# Patient Record
Sex: MALE | Race: WHITE | NOT HISPANIC OR LATINO | Employment: UNEMPLOYED | URBAN - METROPOLITAN AREA
[De-identification: names, ages, dates, MRNs, and addresses within clinical notes are randomized per-mention and may not be internally consistent; named-entity substitution may affect disease eponyms.]

---

## 2017-01-08 PROBLEM — E10.3499 SEVERE NONPROLIFERATIVE DIABETIC RETINOPATHY WITHOUT MACULAR EDEMA ASSOCIATED WITH TYPE 1 DIABETES MELLITUS (HCC): Status: ACTIVE | Noted: 2017-01-08

## 2017-01-16 ENCOUNTER — PREP FOR SURGERY (OUTPATIENT)
Dept: CARDIAC SURGERY | Facility: CLINIC | Age: 52
End: 2017-01-16

## 2017-01-16 DIAGNOSIS — I73.9 PERIPHERAL VASCULAR DISEASE (HCC): Primary | ICD-10-CM

## 2017-01-16 RX ORDER — CHLORHEXIDINE GLUCONATE 500 MG/1
1 CLOTH TOPICAL EVERY 12 HOURS PRN
Status: CANCELLED | OUTPATIENT
Start: 2017-01-30

## 2017-01-17 ENCOUNTER — TELEPHONE (OUTPATIENT)
Dept: ENDOCRINOLOGY | Facility: CLINIC | Age: 52
End: 2017-01-17

## 2017-01-17 NOTE — TELEPHONE ENCOUNTER
Spoke to patient about upcoming vascular surgery. Reviewed his BG data. Having hyperglycemia more recently:    - Increasing NPH to 14 units qAM, 14 units qhs  - Pt to take 2-4 units of Humalog with all food  PLUS a correction scale of 1 unit :  1 unit : 75 points BG > 200.    - pt to call me with BG data in 3 days.   - Will give patient insulin instructions for night before and morning of surgery.     Shana Monahan MD     ----- Message from Glo Wilkins sent at 1/17/2017  4:23 PM EST -----  Contact: PATIENT   RE: UPCOMING SURGERY    PATIENT WANTED TO LET YOU KNOW THAT DR FREDERICK AT [facility] WILL BE PERFORMING SURGERY ON GROIN FOR BLOCKAGES. THAT SURGERY IS SCHEDULED FOR 1/31

## 2017-01-20 ENCOUNTER — TELEPHONE (OUTPATIENT)
Dept: ENDOCRINOLOGY | Facility: CLINIC | Age: 52
End: 2017-01-20

## 2017-01-20 DIAGNOSIS — IMO0002 UNCONTROLLED TYPE 1 DIABETES MELLITUS WITH COMPLICATION: Primary | ICD-10-CM

## 2017-01-20 NOTE — TELEPHONE ENCOUNTER
Pt called in BG log as requested. He has not been following my recommendations and as a result is having wide fluctuations in his blood glucose.      - Reviewed again with patient that he must take Humalog with all food and Boost supplements and he must use the correction scale I give him instead of guessing.    Pt to take the following insulins:    - NPH 14 units qAM, NPH 14 units qHS    - Humalog 2-4 units with food PLUS correction scale of 1 units per every 100 > 200 (see below)    - Boost supplements: 2 units   - Food: 4 units    Scale:   0 units if BG < 200  1 units if B - 300  2 units if B - 400  3 units if B - 500  4 units if BG: >500    Shana Monahan MD  Pt to call me with BG next week.    ----- Message from Radha Harris sent at 2017  1:32 PM EST -----  Contact: PATIENT  PATIENT STATED HE WAS TO CALL YOU TODAY. [phone]

## 2017-01-24 ENCOUNTER — HOSPITAL ENCOUNTER (OUTPATIENT)
Dept: OTHER | Age: 52
Discharge: HOME | End: 2017-01-24
Attending: NURSE PRACTITIONER | Admitting: NURSE PRACTITIONER

## 2017-01-24 LAB
AMPHETAMINE SCREEN, URINE: ABNORMAL
BARBITURATE SCREEN URINE: ABNORMAL
BENZODIAZEPINE SCREEN, URINE: ABNORMAL
CANNABINOID SCREEN URINE: ABNORMAL
COCAINE METABOLITE SCREEN URINE: ABNORMAL
Lab: ABNORMAL
METHADONE SCREEN, URINE: ABNORMAL
METHAMPHETAMINE, URINE: ABNORMAL
OPIATE SCREEN URINE: POSITIVE
PHENCYCLIDINE SCREEN URINE: ABNORMAL
PROPOXYPHENE SCREEN, URINE: ABNORMAL
TRICYCLIC, URINE: ABNORMAL
UR OXYCODONE RAPID SCREEN: ABNORMAL

## 2017-01-30 ENCOUNTER — APPOINTMENT (OUTPATIENT)
Dept: PREADMISSION TESTING | Facility: HOSPITAL | Age: 52
End: 2017-01-30

## 2017-01-31 ENCOUNTER — APPOINTMENT (OUTPATIENT)
Dept: GENERAL RADIOLOGY | Facility: HOSPITAL | Age: 52
End: 2017-01-31

## 2017-01-31 ENCOUNTER — ANESTHESIA EVENT (OUTPATIENT)
Dept: PERIOP | Facility: HOSPITAL | Age: 52
End: 2017-01-31

## 2017-01-31 ENCOUNTER — ANESTHESIA (OUTPATIENT)
Dept: PERIOP | Facility: HOSPITAL | Age: 52
End: 2017-01-31

## 2017-01-31 PROBLEM — I10 HYPERTENSION: Status: ACTIVE | Noted: 2017-01-31

## 2017-01-31 PROBLEM — B95.8 STAPH INFECTION: Status: ACTIVE | Noted: 2017-01-31

## 2017-01-31 PROBLEM — K31.84 GASTROPARESIS: Status: ACTIVE | Noted: 2017-01-31

## 2017-01-31 PROBLEM — Z85.89 HISTORY OF CANCER OF HEAD OR NECK: Status: ACTIVE | Noted: 2017-01-31

## 2017-01-31 PROCEDURE — 25010000002 CEFUROXIME PER 750 MG: Performed by: PHYSICIAN ASSISTANT

## 2017-01-31 PROCEDURE — 25010000002 MIDAZOLAM PER 1 MG: Performed by: NURSE ANESTHETIST, CERTIFIED REGISTERED

## 2017-01-31 PROCEDURE — 25010000002 ONDANSETRON PER 1 MG: Performed by: NURSE ANESTHETIST, CERTIFIED REGISTERED

## 2017-01-31 PROCEDURE — 25010000002 FENTANYL CITRATE (PF) 100 MCG/2ML SOLUTION: Performed by: NURSE ANESTHETIST, CERTIFIED REGISTERED

## 2017-01-31 PROCEDURE — 25010000002 PROPOFOL 10 MG/ML EMULSION: Performed by: NURSE ANESTHETIST, CERTIFIED REGISTERED

## 2017-01-31 PROCEDURE — 25010000002 PROTAMINE SULFATE PER 10 MG: Performed by: NURSE ANESTHETIST, CERTIFIED REGISTERED

## 2017-01-31 PROCEDURE — 25010000002 NEOSTIGMINE 10 MG/10ML SOLUTION: Performed by: NURSE ANESTHETIST, CERTIFIED REGISTERED

## 2017-01-31 PROCEDURE — 25010000002 DEXAMETHASONE PER 1 MG: Performed by: NURSE ANESTHETIST, CERTIFIED REGISTERED

## 2017-01-31 RX ORDER — MIDAZOLAM HYDROCHLORIDE 1 MG/ML
INJECTION INTRAMUSCULAR; INTRAVENOUS AS NEEDED
Status: DISCONTINUED | OUTPATIENT
Start: 2017-01-31 | End: 2017-01-31 | Stop reason: SURG

## 2017-01-31 RX ORDER — ESMOLOL HYDROCHLORIDE 10 MG/ML
INJECTION INTRAVENOUS AS NEEDED
Status: DISCONTINUED | OUTPATIENT
Start: 2017-01-31 | End: 2017-01-31 | Stop reason: SURG

## 2017-01-31 RX ORDER — LIDOCAINE HYDROCHLORIDE 20 MG/ML
INJECTION, SOLUTION INFILTRATION; PERINEURAL AS NEEDED
Status: DISCONTINUED | OUTPATIENT
Start: 2017-01-31 | End: 2017-01-31 | Stop reason: SURG

## 2017-01-31 RX ORDER — PROPOFOL 10 MG/ML
VIAL (ML) INTRAVENOUS AS NEEDED
Status: DISCONTINUED | OUTPATIENT
Start: 2017-01-31 | End: 2017-01-31 | Stop reason: SURG

## 2017-01-31 RX ORDER — PROTAMINE SULFATE 10 MG/ML
INJECTION, SOLUTION INTRAVENOUS AS NEEDED
Status: DISCONTINUED | OUTPATIENT
Start: 2017-01-31 | End: 2017-01-31 | Stop reason: SURG

## 2017-01-31 RX ORDER — GLYCOPYRROLATE 0.2 MG/ML
INJECTION INTRAMUSCULAR; INTRAVENOUS AS NEEDED
Status: DISCONTINUED | OUTPATIENT
Start: 2017-01-31 | End: 2017-01-31 | Stop reason: SURG

## 2017-01-31 RX ORDER — NEOSTIGMINE METHYLSULFATE 1 MG/ML
INJECTION, SOLUTION INTRAVENOUS AS NEEDED
Status: DISCONTINUED | OUTPATIENT
Start: 2017-01-31 | End: 2017-01-31 | Stop reason: SURG

## 2017-01-31 RX ORDER — ONDANSETRON 2 MG/ML
INJECTION INTRAMUSCULAR; INTRAVENOUS AS NEEDED
Status: DISCONTINUED | OUTPATIENT
Start: 2017-01-31 | End: 2017-01-31 | Stop reason: SURG

## 2017-01-31 RX ORDER — FENTANYL CITRATE 50 UG/ML
INJECTION, SOLUTION INTRAMUSCULAR; INTRAVENOUS AS NEEDED
Status: DISCONTINUED | OUTPATIENT
Start: 2017-01-31 | End: 2017-01-31 | Stop reason: SURG

## 2017-01-31 RX ORDER — DEXAMETHASONE SODIUM PHOSPHATE 4 MG/ML
INJECTION, SOLUTION INTRA-ARTICULAR; INTRALESIONAL; INTRAMUSCULAR; INTRAVENOUS; SOFT TISSUE AS NEEDED
Status: DISCONTINUED | OUTPATIENT
Start: 2017-01-31 | End: 2017-01-31 | Stop reason: SURG

## 2017-01-31 RX ORDER — ROCURONIUM BROMIDE 10 MG/ML
INJECTION, SOLUTION INTRAVENOUS AS NEEDED
Status: DISCONTINUED | OUTPATIENT
Start: 2017-01-31 | End: 2017-01-31 | Stop reason: SURG

## 2017-01-31 RX ADMIN — FENTANYL CITRATE 50 MCG: 50 INJECTION, SOLUTION INTRAMUSCULAR; INTRAVENOUS at 15:53

## 2017-01-31 RX ADMIN — ESMOLOL HYDROCHLORIDE 10 MG: 10 INJECTION, SOLUTION INTRAVENOUS at 16:11

## 2017-01-31 RX ADMIN — ROBINUL 0.4 MG: 0.2 INJECTION INTRAMUSCULAR; INTRAVENOUS at 17:05

## 2017-01-31 RX ADMIN — DEXAMETHASONE SODIUM PHOSPHATE 4 MG: 4 INJECTION, SOLUTION INTRAMUSCULAR; INTRAVENOUS at 16:01

## 2017-01-31 RX ADMIN — ONDANSETRON 4 MG: 2 INJECTION INTRAMUSCULAR; INTRAVENOUS at 17:05

## 2017-01-31 RX ADMIN — SODIUM CHLORIDE, POTASSIUM CHLORIDE, SODIUM LACTATE AND CALCIUM CHLORIDE: 600; 310; 30; 20 INJECTION, SOLUTION INTRAVENOUS at 15:42

## 2017-01-31 RX ADMIN — ESMOLOL HYDROCHLORIDE 10 MG: 10 INJECTION, SOLUTION INTRAVENOUS at 16:16

## 2017-01-31 RX ADMIN — FENTANYL CITRATE 50 MCG: 50 INJECTION, SOLUTION INTRAMUSCULAR; INTRAVENOUS at 17:10

## 2017-01-31 RX ADMIN — MIDAZOLAM HYDROCHLORIDE 2 MG: 1 INJECTION, SOLUTION INTRAMUSCULAR; INTRAVENOUS at 15:42

## 2017-01-31 RX ADMIN — ROCURONIUM BROMIDE 40 MG: 10 INJECTION INTRAVENOUS at 15:48

## 2017-01-31 RX ADMIN — FENTANYL CITRATE 100 MCG: 50 INJECTION, SOLUTION INTRAMUSCULAR; INTRAVENOUS at 15:43

## 2017-01-31 RX ADMIN — PROTAMINE SULFATE 25 MG: 10 INJECTION, SOLUTION INTRAVENOUS at 16:55

## 2017-01-31 RX ADMIN — PROPOFOL 200 MG: 10 INJECTION, EMULSION INTRAVENOUS at 15:48

## 2017-01-31 RX ADMIN — FENTANYL CITRATE 50 MCG: 50 INJECTION, SOLUTION INTRAMUSCULAR; INTRAVENOUS at 17:00

## 2017-01-31 RX ADMIN — SODIUM CHLORIDE, POTASSIUM CHLORIDE, SODIUM LACTATE AND CALCIUM CHLORIDE: 600; 310; 30; 20 INJECTION, SOLUTION INTRAVENOUS at 16:10

## 2017-01-31 RX ADMIN — NEOSTIGMINE METHYLSULFATE 3 MG: 1 INJECTION, SOLUTION INTRAVENOUS at 17:05

## 2017-01-31 RX ADMIN — PROTAMINE SULFATE 25 MG: 10 INJECTION, SOLUTION INTRAVENOUS at 16:58

## 2017-01-31 RX ADMIN — ESMOLOL HYDROCHLORIDE 10 MG: 10 INJECTION, SOLUTION INTRAVENOUS at 15:57

## 2017-01-31 RX ADMIN — ESMOLOL HYDROCHLORIDE 10 MG: 10 INJECTION, SOLUTION INTRAVENOUS at 16:04

## 2017-01-31 RX ADMIN — LIDOCAINE HYDROCHLORIDE 50 MG: 20 INJECTION, SOLUTION INFILTRATION; PERINEURAL at 15:47

## 2017-01-31 RX ADMIN — PROTAMINE SULFATE 25 MG: 10 INJECTION, SOLUTION INTRAVENOUS at 17:00

## 2017-01-31 RX ADMIN — CEFUROXIME 1.5 G: 1.5 INJECTION, SOLUTION INTRAVENOUS at 15:42

## 2017-02-06 ENCOUNTER — TELEPHONE (OUTPATIENT)
Dept: ENDOCRINOLOGY | Facility: CLINIC | Age: 52
End: 2017-02-06

## 2017-02-06 DIAGNOSIS — IMO0002 UNCONTROLLED TYPE 1 DIABETES MELLITUS WITH COMPLICATION: Primary | ICD-10-CM

## 2017-02-06 RX ORDER — SYRINGE-NEEDLE,INSULIN,0.5 ML 27GX1/2"
1 SYRINGE, EMPTY DISPOSABLE MISCELLANEOUS 2 TIMES DAILY
Qty: 200 EACH | Refills: 3 | Status: SHIPPED | OUTPATIENT
Start: 2017-02-06 | End: 2017-04-21 | Stop reason: SDUPTHER

## 2017-02-06 RX ORDER — PEN NEEDLE, DIABETIC 29 G X1/2"
1 NEEDLE, DISPOSABLE MISCELLANEOUS 3 TIMES DAILY
Qty: 300 EACH | Refills: 3 | Status: SHIPPED | OUTPATIENT
Start: 2017-02-06 | End: 2017-12-09 | Stop reason: SDUPTHER

## 2017-02-06 RX ORDER — INSULIN ASPART 100 [IU]/ML
INJECTION, SOLUTION INTRAVENOUS; SUBCUTANEOUS
Qty: 10 PEN | Refills: 3 | Status: SHIPPED | OUTPATIENT
Start: 2017-02-06 | End: 2017-02-09 | Stop reason: SDUPTHER

## 2017-02-06 NOTE — TELEPHONE ENCOUNTER
Spoke to patient regarding his BG's. Having uncontrolled hyperglycemia since his surgery 1/31/2017.   Increased his NPH to 20 units BID, increased his humalog to 6 units with meals. Correction factor of 1: 100 > 100. Can change to 1: 50 > 150 if needed.   Pt to call me in 2-3 days with more BG data.     Pt's insurance formulary has changed. Pt needs insulin changed to Novolin N, and Novolog pens.  Shana Monahan MD    ----- Message from Radha Tovar sent at 2/6/2017  4:30 PM EST -----  THE PATIENT WOULD LIKE FOR YOU TO GIVE HIM A CALL BACK [phone]

## 2017-02-09 DIAGNOSIS — IMO0002 UNCONTROLLED TYPE 1 DIABETES MELLITUS WITH COMPLICATION: Primary | ICD-10-CM

## 2017-02-09 RX ORDER — INSULIN ASPART 100 [IU]/ML
INJECTION, SOLUTION INTRAVENOUS; SUBCUTANEOUS
Qty: 10 PEN | Refills: 3 | Status: SHIPPED | OUTPATIENT
Start: 2017-02-09 | End: 2017-04-11 | Stop reason: SDUPTHER

## 2017-02-13 ENCOUNTER — TELEPHONE (OUTPATIENT)
Dept: CARDIAC SURGERY | Facility: CLINIC | Age: 52
End: 2017-02-13

## 2017-02-13 NOTE — TELEPHONE ENCOUNTER
MR. FREEMAN CALLED WANTING A REFILL ON HIS PAIN MEDICATION.     DR. FREDERICK WROTE A SCRIPT FOR [place] 7.5/ 325.      NOTIFIED PATIENT AND HE ASKED TO MAIL IT TO HIM SO IT IS IN THE MAIL.

## 2017-03-08 ENCOUNTER — HOSPITAL ENCOUNTER (OUTPATIENT)
Dept: MRI IMAGING | Age: 52
Discharge: HOME | End: 2017-03-08

## 2017-03-08 DIAGNOSIS — G89.29 CHRONIC LOW BACK PAIN, UNSPECIFIED BACK PAIN LATERALITY, WITH SCIATICA PRESENCE UNSPECIFIED: ICD-10-CM

## 2017-03-08 DIAGNOSIS — M54.16 LUMBAR RADICULOPATHY: ICD-10-CM

## 2017-03-08 DIAGNOSIS — G89.21 CHRONIC PAIN DUE TO TRAUMA: ICD-10-CM

## 2017-03-08 DIAGNOSIS — M54.5 CHRONIC LOW BACK PAIN, UNSPECIFIED BACK PAIN LATERALITY, WITH SCIATICA PRESENCE UNSPECIFIED: ICD-10-CM

## 2017-03-16 ENCOUNTER — TELEPHONE (OUTPATIENT)
Dept: CARDIAC SURGERY | Facility: CLINIC | Age: 52
End: 2017-03-16

## 2017-03-16 NOTE — TELEPHONE ENCOUNTER
TONY GASTELUM IS GOING TO THE ER IN [place], [place] RIGHT FOOT PAIN HAS BECOME WORSE. PATIENT CAN BARLEY WALK, RIGHT FOOT HAS NOW BECOME SWOLLEN, UNABLE TO ELEVATE FOOT DUE TO SHARP PAINS. PATIENT UNABLE TO MAKE IT TO [place] TO SEE A PA, NO TRANSPORTATION. [place] STATES HE CALLED       DR. BRITO OFFICE THEY ADVISED HE GO INTO THE ER. PATIENT ASKED FOR PAIN MEDICINE I ADVISED HE GOES TO A PAIN CLINIC IN [place] WE CAN NOT WRITE HIM ANY PAIN MEDICINE. I ADVISED HE CONTACT DR. BRIGITTE MUNROE MD (HIS PAIN DOCTOR) CONCERNING HIS PAIN.

## 2017-03-17 DIAGNOSIS — M79.671 RIGHT FOOT PAIN: Primary | ICD-10-CM

## 2017-04-04 ENCOUNTER — OFFICE VISIT (OUTPATIENT)
Dept: ORTHOPEDIC SURGERY | Facility: CLINIC | Age: 52
End: 2017-04-04

## 2017-04-04 DIAGNOSIS — I73.9 PERIPHERAL VASCULAR DISEASE (HCC): Primary | ICD-10-CM

## 2017-04-04 DIAGNOSIS — E10.49 OTHER DIABETIC NEUROLOGICAL COMPLICATION ASSOCIATED WITH TYPE 1 DIABETES MELLITUS (HCC): ICD-10-CM

## 2017-04-04 PROCEDURE — 99204 OFFICE O/P NEW MOD 45 MIN: CPT | Performed by: PODIATRIST

## 2017-04-04 RX ORDER — AMITRIPTYLINE HYDROCHLORIDE 100 MG/1
100 TABLET, FILM COATED ORAL NIGHTLY
COMMUNITY
Start: 2017-03-23

## 2017-04-04 RX ORDER — AMOXICILLIN AND CLAVULANATE POTASSIUM 875; 125 MG/1; MG/1
TABLET, FILM COATED ORAL
Refills: 0 | Status: ON HOLD | COMMUNITY
Start: 2017-02-23 | End: 2019-08-16

## 2017-04-04 RX ORDER — LIDOCAINE AND PRILOCAINE 25; 25 MG/G; MG/G
CREAM TOPICAL
Status: ON HOLD | COMMUNITY
Start: 2017-03-07 | End: 2019-08-16

## 2017-04-04 RX ORDER — PEN NEEDLE, DIABETIC 29 G X1/2"
NEEDLE, DISPOSABLE MISCELLANEOUS
COMMUNITY
Start: 2017-02-07 | End: 2017-04-11 | Stop reason: SDUPTHER

## 2017-04-04 RX ORDER — BLOOD SUGAR DIAGNOSTIC
STRIP MISCELLANEOUS
COMMUNITY
Start: 2015-03-17 | End: 2017-09-05 | Stop reason: SDUPTHER

## 2017-04-04 RX ORDER — HYDROCODONE BITARTRATE AND ACETAMINOPHEN 5; 325 MG/1; MG/1
TABLET ORAL
Refills: 0 | Status: ON HOLD | COMMUNITY
Start: 2017-03-23 | End: 2019-08-16

## 2017-04-04 RX ORDER — HYDROCODONE BITARTRATE AND ACETAMINOPHEN 7.5; 325 MG/1; MG/1
1 TABLET ORAL EVERY 6 HOURS PRN
Status: ON HOLD | COMMUNITY
End: 2019-08-28 | Stop reason: SDUPTHER

## 2017-04-04 RX ORDER — GABAPENTIN 800 MG/1
TABLET ORAL
Status: ON HOLD | COMMUNITY
End: 2019-08-15

## 2017-04-04 RX ORDER — CEPHALEXIN 500 MG/1
CAPSULE ORAL
Refills: 0 | Status: ON HOLD | COMMUNITY
Start: 2017-03-16 | End: 2019-08-16

## 2017-04-04 RX ORDER — PEN NEEDLE, DIABETIC
NEEDLE, DISPOSABLE MISCELLANEOUS
COMMUNITY
Start: 2017-02-28 | End: 2017-12-09

## 2017-04-04 RX ORDER — PREGABALIN 50 MG/1
50 CAPSULE ORAL 3 TIMES DAILY
Qty: 90 CAPSULE | Refills: 2
Start: 2017-04-04 | End: 2017-06-20 | Stop reason: SDUPTHER

## 2017-04-04 NOTE — PROGRESS NOTES
Subjective   Patient ID: Joey Saunders is a 51 y.o. male   Pain of the Right Foot   resents the chief complaint of a right foot and leg pain and swelling.  Seems to be a fairly poorly controlled diabetic male.  Has a significant peripheral vascular disease history.  Also sees pain management.  Says he seen by Dr. Brown at [facility] who is performed a vascular work in his pelvis but done nothing to his legs that he knows of.  His pain management physician has him on the Norco 7.5.  Patient's also on Plavix and 3200 mg a day of gabapentin.    History of Present Illness    Review of Systems all systems reviewed and unremarkable.    Past Medical History:   Diagnosis Date   • Arthritis    • Cellulitis of right abdominal wall    • Colon cancer     s/p partial colectomy   • Diabetes    • Gastroparesis     s/p partial gastrectomy with Rou-en-y recontstruction, [facility] Dr. Escobar   • H/O malignant neoplasm of breast    • History of cancer of head or neck    • Hypertension    • Open abdominal wall wound    • Peripheral vascular disease    • Staph infection     ABD- 2016- seen by imfectious disease in [place]   • Type 1 diabetes     dx age 6- checks fsbs 6 times per day        Past Surgical History:   Procedure Laterality Date   • AORTAGRAM N/A 12/20/2016    Procedure: AORTAGRAM WITH RUNOFF;  Surgeon: Jose Enrique Brown MD;  Location:  [facility] OR ;  Service:    • CHOLECYSTECTOMY     • FEMORAL FEMORAL BYPASS N/A 1/31/2017    Procedure: FEMORAL FEMORAL BYPASS, Right Femoral Endarterectomy, ;  Surgeon: Jose Enrique Brown MD;  Location:  [facility] OR 15;  Service:    • GASTROSTOMY     • HERNIA REPAIR      bilateral inguinal   • NECK SURGERY     • OTHER SURGICAL HISTORY      Enteroscopic PEJ Tube Placement   • TRIGGER FINGER RELEASE      5 YEARS AGO       No Known Allergies    @/ reviewed@    Objective   Physical Exam   Constitutional: He is oriented to person, place, and time. He appears well-developed  and well-nourished.   HENT:   Head: Normocephalic.   Eyes: Pupils are equal, round, and reactive to light.   Neurological: He is alert and oriented to person, place, and time.   Skin: Skin is warm.   Psychiatric: He has a normal mood and affect. His behavior is normal.   Vitals reviewed.    Ortho Exam  Ortho Exam  Right Lower extremity exam:  Vascular: Pulses are nonpalpable, painful edema is noted to all digits of the right foot.  Right foot is cool to touch to about the midfoot level, no pedal hair is noted  Neuro: Gross sensations intact  Derm: There is some slightly raised erythematous and edematous patches along the lateral aspect of the foot potentially related to tinea.  He also has interdigital maceration between the fourth and fifth digits and an ulceration on the lateral aspect of the fourth digit over the IPJ.  The wound is about the size of a pea pin and measuring 2-3 mm but it does probe deep into bone.  All his digits are extremely painful.  MSK: Gross orthopedic deformities are noted.    Assessment/Plan  bilateral lower extremity neuropathy, diabetes, severe peripheral arterial disease, right fourth digit wound.  Independent Review of Radiographic Studies:    3 view x-rays of the right foot taken [facility] today are negative for any sign of fracture.  No obvious bony destruction of the fourth digits noted.  Laboratory and Other Studies:     Medical Decision Making:        Procedures  [] No procedures were performed in office today.    There are no diagnoses linked to this encounter.      Recommendations/Plan:  He supposed to see Dr. Brown tomorrow and I would like to wait his plans recommendations.  I think of patient's most likely in need of some type of additional vascular work to help increased perfusion to the right lower extremity.  I explained to him that there is not a lot I can do other than help facilitate at this point.  I'll also try adding Lyrica to his gabapentin see if that helps  at all with his neuropathy pains.  I recommend he not elevate his legs and never use ice.  Explained him that keeping them cutting down out of bed and/or hang-up walking will help increase blood flow and oxygen to his legs and feet.  I'll see him back in 2 weeks and if need be we can consider having him see Dr. Donaldson.    Return in about 2 weeks (around 4/18/2017).  Patient agreeable to call or return sooner for any concerns.

## 2017-04-07 ENCOUNTER — TELEPHONE (OUTPATIENT)
Dept: CARDIAC SURGERY | Facility: CLINIC | Age: 52
End: 2017-04-07

## 2017-04-07 NOTE — TELEPHONE ENCOUNTER
CALLED STATING HE WAS HAVING SOME PROBLEMS WITH HIS RIGHT LEG AND FOOT. HE IS HAVING SOME SWELLING IN HIS FOOT AND HAVING NUMBNESS IN HIS LEG AND FOOT. HE HAS CANCELLED SEVERAL APPOINTMENTS DUE TO TRANSPORTATION ISSUES. HE STATES HE WENT AND SEEN DR. REGAN AND HE WANTED HIM SEEN AS SOON AS POSSIBLE BECAUSE HE WAS UNABLE TO GET PULSES IN THAT LEG AND FOOT.    I INFORMED PATIENT THAT WE COULD SEE HIM Monday WITH A PA AND IF ANYTHING WAS NEEDED THAT HE COULD CONSULT DR. FREDERICK. THE PATIENT STATED THAT HE WOULD FIND  A WAY TO GET HERE ON Monday. I EXPRESSED THAT WE CAN NOT TREAT THE PROBLEM UNLESS HE WAS EVALUATED SO WE KNOW EXACTLY WHAT IS GOING ON AND RESOLVE THE ISSUES HE IS HAVING.

## 2017-04-11 DIAGNOSIS — IMO0002 UNCONTROLLED TYPE 1 DIABETES MELLITUS WITH COMPLICATION: ICD-10-CM

## 2017-04-11 RX ORDER — INSULIN ASPART 100 [IU]/ML
INJECTION, SOLUTION INTRAVENOUS; SUBCUTANEOUS
Qty: 10 PEN | Refills: 3 | Status: SHIPPED | OUTPATIENT
Start: 2017-04-11 | End: 2017-09-05 | Stop reason: SDUPTHER

## 2017-04-11 RX ORDER — PEN NEEDLE, DIABETIC 29 G X1/2"
1 NEEDLE, DISPOSABLE MISCELLANEOUS 4 TIMES DAILY
Qty: 400 EACH | Refills: 3 | Status: SHIPPED | OUTPATIENT
Start: 2017-04-11 | End: 2017-11-06 | Stop reason: SDUPTHER

## 2017-04-18 ENCOUNTER — OFFICE VISIT (OUTPATIENT)
Dept: ORTHOPEDIC SURGERY | Facility: CLINIC | Age: 52
End: 2017-04-18

## 2017-04-18 VITALS — WEIGHT: 147 LBS | HEIGHT: 69 IN | BODY MASS INDEX: 21.77 KG/M2 | RESPIRATION RATE: 16 BRPM

## 2017-04-18 DIAGNOSIS — S91.301D WOUND, OPEN, FOOT, RIGHT, SUBSEQUENT ENCOUNTER: ICD-10-CM

## 2017-04-18 DIAGNOSIS — M79.671 RIGHT FOOT PAIN: ICD-10-CM

## 2017-04-18 DIAGNOSIS — E10.49 OTHER DIABETIC NEUROLOGICAL COMPLICATION ASSOCIATED WITH TYPE 1 DIABETES MELLITUS (HCC): ICD-10-CM

## 2017-04-18 DIAGNOSIS — I73.9 PERIPHERAL VASCULAR DISEASE (HCC): Primary | ICD-10-CM

## 2017-04-18 DIAGNOSIS — R60.0 EDEMA OF EXTREMITY OF UNKNOWN CAUSE: ICD-10-CM

## 2017-04-18 PROCEDURE — 99214 OFFICE O/P EST MOD 30 MIN: CPT | Performed by: PODIATRIST

## 2017-04-18 NOTE — PROGRESS NOTES
Subjective   Patient ID: Joey Saunders is a 51 y.o. male   Follow-up of the Right Foot and Wound Check (Right fourth digit wound)   returns today still complaining of severe intense right foot and leg pain.  Says he did see Dr. Brown and Dr. Brown has him scheduled for an angiogram on May 5.  Says also been back to the pain clinic and they have slightly increased his pain medication dosages.  She's been sleeping in a recliner with his feet down but hasn't slept in about a week.    History of Present Illness    Review of Systems   Constitutional: Negative for diaphoresis, fever and unexpected weight change.   HENT: Negative for dental problem and sore throat.    Eyes: Negative for visual disturbance.   Respiratory: Negative for shortness of breath.    Cardiovascular: Negative for chest pain.   Gastrointestinal: Negative for abdominal pain, constipation, diarrhea, nausea and vomiting.   Genitourinary: Negative for difficulty urinating and frequency.   Musculoskeletal: Positive for arthralgias.   Neurological: Negative for headaches.   Hematological: Does not bruise/bleed easily.   All other systems reviewed and are negative.      Past Medical History:   Diagnosis Date   • Arthritis    • Cellulitis of right abdominal wall    • Colon cancer     s/p partial colectomy   • Diabetes    • Gastroparesis     s/p partial gastrectomy with Rou-en-y recontstruction, [facility] Dr. Escobar   • H/O malignant neoplasm of breast    • History of cancer of head or neck    • Hypertension    • Open abdominal wall wound    • Peripheral vascular disease    • Staph infection     ABD- 2016- seen by imfectious disease in [place]   • Type 1 diabetes     dx age 6- checks fsbs 6 times per day        Past Surgical History:   Procedure Laterality Date   • AORTAGRAM N/A 12/20/2016    Procedure: AORTAGRAM WITH RUNOFF;  Surgeon: Jose Enrique Brown MD;  Location: [place] [address];  Service:    • CHOLECYSTECTOMY     • FEMORAL FEMORAL BYPASS N/A 1/31/2017     Procedure: FEMORAL FEMORAL BYPASS, Right Femoral Endarterectomy, ;  Surgeon: Jose Enrique Brown MD;  Location: [facility] OR 15;  Service:    • GASTROSTOMY     • HERNIA REPAIR      bilateral inguinal   • NECK SURGERY     • OTHER SURGICAL HISTORY      Enteroscopic PEJ Tube Placement   • TRIGGER FINGER RELEASE      5 YEARS AGO       No Known Allergies    Objective   Physical Exam   Constitutional: He is oriented to person, place, and time. He appears well-developed and well-nourished.   HENT:   Head: Normocephalic.   Eyes: Pupils are equal, round, and reactive to light.   Neurological: He is alert and oriented to person, place, and time.   Skin: Skin is warm.   Psychiatric: He has a normal mood and affect. His behavior is normal.   Vitals reviewed.    Ortho Exam  Ortho Exam  Right Lower extremity exam:  Vascular: Pulses are nonpalpable, painful edema is noted to all digits of the right foot. Right foot is cool to touch to about the midfoot level, no pedal hair is noted  Neuro: Gross sensations intact  Derm: There is some slightly raised erythematous and edematous patches along the lateral aspect of the foot potentially related to tinea.  His interspaces are much more dry now.  The wound is present in the fourth interspace previously is driving callus now.  There is no significant interdigital maceration or odor.  The wound on the lateral aspect of the fourth digit is dry and appears to be healing and only a couple millimeters in diameter.  All his digits are extremely painful.  MSK: Gross orthopedic deformities are noted  Assessment/Plan  right fourth digit wound secondary to PVD.  Severe bilateral lower extremity PVD/PAD, diabetic neuropathy, lumbar radiculopathy  Independent Review of Radiographic Studies:      Laboratory and Other Studies:     Medical Decision Making:        Procedures  [] No procedures were performed in office today.    Joey was seen today for wound check and follow-up.    Diagnoses and all  orders for this visit:    Peripheral vascular disease    Edema of extremity of unknown cause    Other diabetic neurological complication associated with type 1 diabetes mellitus    Right foot pain    Wound, open, foot, right, subsequent encounter        Recommendations/Plan:  I again had a very long lengthy discussion with him today with about 30 minutes of face-to-face time trying to explain to him and discussed to him that the wound that's very small on his fourth toe was not what's causing all his problems.  I again explained him that there is not much I can do and a sense of treatment for him.  I'll try to discuss the severity of his vascular disease and explained that this is for the vascular surgeons to help try to treat and alleviate.  Also discussed with him his diabetic neuropathy as well as probable lumbar neuropathy/radiculopathy secondary to his bulging disc and other issues.  I explained him that this is something that has to be managed by pain management and/or a spine surgeon as well.  He is already on heavy doses of pain medication as well as gabapentin.  He says he hasn't seen a difference in the Lyrica are prescribed him yet.  I recommend he keep taking the Lyrica.  I explained that this may continue to help file if he gives it more time.  I recommend he follow-up with his angiogram and I can see him after that but explained that that should help I also think may be seen Dr. Gregory for Dr. coffey might be a good idea just to get a second opinion on his lumbar spine.  I'll see him back in about a month.    Return in about 4 weeks (around 5/16/2017).  Patient agreeable to call or return sooner for any concerns.

## 2017-04-20 ENCOUNTER — TELEPHONE (OUTPATIENT)
Dept: ENDOCRINOLOGY | Facility: CLINIC | Age: 52
End: 2017-04-20

## 2017-04-20 DIAGNOSIS — IMO0002 UNCONTROLLED TYPE 1 DIABETES MELLITUS WITH COMPLICATION: ICD-10-CM

## 2017-04-20 NOTE — TELEPHONE ENCOUNTER
----- Message from Radha Harris sent at 4/20/2017  2:38 PM EDT -----  Contact: PATIENT  THE PATIENT CALLED STATING THE SYRINGE'S NEED TO BE 28 ADÁN NOT 30.  ALSO, THE LAST PRESCRIPTIONS SENT INTO THE WRONG PHARMACY. HE USES [facility] MAIL ORDER.    YOU MAY WANT TO CALL HIM TO VERIFY . [phone]

## 2017-04-21 RX ORDER — SYRINGE-NEEDLE,INSULIN,0.5 ML 27GX1/2"
1 SYRINGE, EMPTY DISPOSABLE MISCELLANEOUS 2 TIMES DAILY
Qty: 200 EACH | Refills: 3 | Status: SHIPPED | OUTPATIENT
Start: 2017-04-21 | End: 2017-12-09 | Stop reason: SDUPTHER

## 2017-06-13 ENCOUNTER — OFFICE VISIT (OUTPATIENT)
Dept: NEUROSURGERY | Facility: CLINIC | Age: 52
End: 2017-06-13

## 2017-06-13 VITALS — HEIGHT: 69 IN | BODY MASS INDEX: 22.22 KG/M2 | WEIGHT: 150 LBS

## 2017-06-13 DIAGNOSIS — M48.062 SPINAL STENOSIS, LUMBAR REGION, WITH NEUROGENIC CLAUDICATION: Primary | ICD-10-CM

## 2017-06-13 PROBLEM — M48.061 STENOSIS OF LATERAL RECESS OF LUMBAR SPINE: Status: ACTIVE | Noted: 2017-06-13

## 2017-06-13 PROCEDURE — 99203 OFFICE O/P NEW LOW 30 MIN: CPT | Performed by: NEUROLOGICAL SURGERY

## 2017-06-13 NOTE — PROGRESS NOTES
Subjective   Patient ID: Joey Saunders is a 51 y.o. male is being seen for consultation today at the request of BEAR Alas  Chief Complaint: Right hip and leg pain    History of Present Illness: The patient has a right hip and leg pain syndrome.  It's been present for about 2 years.  It bothers him with bending, walking, or elevating his legs.  He had a vascular bypass procedure connectingh is left to his right side by Dr. Brown, but the symptoms have not improved.  Pain radiates in a radicular pattern to his right foot and occurs as soon as he stands up on the right foot.    The patient lives in [place].  He's been disabled for years.    Review of Radiographic Studies:   Lumbar MRI scan shows mild lateral recess stenosis at L4 5 bilaterally.  There is no clear indication on the scan of the cause for this kind of severe right lumbar radiculopathy.    The following portions of the patient's history were reviewed, updated as appropriate and approved: allergies, current medications, past family history, past medical history, past social history, past surgical history, review of systems and problem list.    Review of Systems   Constitutional: Negative for activity change, appetite change, chills, diaphoresis, fatigue, fever and unexpected weight change.   HENT: Negative for congestion, dental problem, drooling, ear discharge, ear pain, facial swelling, hearing loss, mouth sores, nosebleeds, postnasal drip, rhinorrhea, sinus pressure, sneezing, sore throat, tinnitus, trouble swallowing and voice change.    Eyes: Negative for photophobia, pain, discharge, redness, itching and visual disturbance.   Respiratory: Negative for apnea, cough, choking, chest tightness, shortness of breath, wheezing and stridor.    Cardiovascular: Negative for chest pain, palpitations and leg swelling.   Gastrointestinal: Negative for abdominal distention, abdominal pain, anal bleeding, blood in stool, constipation, diarrhea, nausea,  rectal pain and vomiting.   Endocrine: Negative for cold intolerance, heat intolerance, polydipsia, polyphagia and polyuria.   Genitourinary: Negative for decreased urine volume, difficulty urinating, dysuria, enuresis, flank pain, frequency, genital sores, hematuria and urgency.   Musculoskeletal: Positive for arthralgias, back pain, joint swelling and myalgias. Negative for gait problem, neck pain and neck stiffness.   Skin: Negative for color change, pallor, rash and wound.   Allergic/Immunologic: Negative for environmental allergies, food allergies and immunocompromised state.   Neurological: Positive for weakness and numbness. Negative for dizziness, tremors, seizures, syncope, facial asymmetry, speech difficulty, light-headedness and headaches.   Hematological: Negative for adenopathy. Does not bruise/bleed easily.   Psychiatric/Behavioral: Negative for agitation, behavioral problems, confusion, decreased concentration, dysphoric mood, hallucinations, self-injury, sleep disturbance and suicidal ideas. The patient is not nervous/anxious and is not hyperactive.        Objective     NEUROLOGICAL EXAMINATION:      MENTAL STATUS:  Alert and oriented.  Speech intact.  Recent and remote memory intact.      CRANIAL NERVES:  Cranial nerve II:  Visual fields are full to confrontation.  Cranial nerves III, IV and VI:  PERRLADC.  Extraocular movements are intact.  Nystagmus is not present.  Cranial nerve V:  Facial sensation is intact to light touch.  Cranial nerve VII:  Muscles of facial expression reveal no asymmetry.  Cranial nerve VIII:  Hearing is intact to finger rub bilaterally.  Cranial nerves IX and X:  Palate elevates symmetrically.  Cranial nerve XI:  Shoulder shrug is intact.  Cranial nerve XII:  Tongue is midline without evidence of atrophy or fasciculation.    MUSCULOSKELETAL:  SLR negative. Emmanuel's test negative.  Right foot shows dependent rubor and I can detect no pulse in the right foot.    MOTOR:   Deltoid, biceps, triceps, and  strength intact.  No hand atrophy.  Hip flexion, knee extension, ankle dorsiflexion and plantar flexion intact. No tremor, spasticity, ataxia, or dysmetria.    SENSATION:  Intact to touch upper and lower extremities.  Position sense intact.    REFLEXES:  DTR absent in both knees and ankles.      Assessment   Peripheral vascular disease right lower extremity.  Symptoms suggestive of right lumbar radiculopathy, but equivocal findings on lumbar MRI scan at L4 5.       Plan   Lumbar myelogram       Kristian Florentino MD

## 2017-06-20 ENCOUNTER — OFFICE VISIT (OUTPATIENT)
Dept: ORTHOPEDIC SURGERY | Facility: CLINIC | Age: 52
End: 2017-06-20

## 2017-06-20 VITALS — WEIGHT: 150 LBS | RESPIRATION RATE: 18 BRPM | HEIGHT: 69 IN | BODY MASS INDEX: 22.22 KG/M2

## 2017-06-20 DIAGNOSIS — I73.9 PERIPHERAL VASCULAR DISEASE (HCC): Primary | ICD-10-CM

## 2017-06-20 PROCEDURE — 99213 OFFICE O/P EST LOW 20 MIN: CPT | Performed by: PODIATRIST

## 2017-06-20 RX ORDER — PREGABALIN 50 MG/1
50 CAPSULE ORAL 3 TIMES DAILY
Qty: 90 CAPSULE | Refills: 2
Start: 2017-06-20 | End: 2017-06-20 | Stop reason: SDUPTHER

## 2017-06-20 NOTE — PROGRESS NOTES
Subjective   Patient ID: Joey Saunders is a 51 y.o. male returns with his a complaints of a chronic severe right lower extremity pain.  He did see Dr. Florentino and he tells me that Dr. florentino talked to him about putting something down his back or Darnell dye in his back or something.  He says that Dr. Florentino spoke with Dr. Brown and he was told there is nothing else he can do from a vascular standpoint.  He also states he's been referred to some type of pain specialist in [place].  Still complains of chronic calf pain and discoloration of the foot.  Inquires to me if there is anything all I can do to help with the pain.  Anything I can give him her shots or anything.      History of Present Illness    Review of Systems   Constitutional: Negative for diaphoresis, fever and unexpected weight change.   HENT: Negative for dental problem and sore throat.    Eyes: Negative for visual disturbance.   Respiratory: Negative for shortness of breath.    Cardiovascular: Negative for chest pain.   Gastrointestinal: Negative for abdominal pain, constipation, diarrhea, nausea and vomiting.   Genitourinary: Negative for difficulty urinating and frequency.   Neurological: Negative for headaches.   Hematological: Does not bruise/bleed easily.   All other systems reviewed and are negative.      Past Medical History:   Diagnosis Date   • Arthritis    • Cellulitis of right abdominal wall    • Colon cancer     s/p partial colectomy   • Diabetes    • Gastroparesis     s/p partial gastrectomy with Rou-en-y recontstruction, [facility] Dr. Escobar   • H/O malignant neoplasm of breast    • History of cancer of head or neck    • Hypertension    • Open abdominal wall wound    • Peripheral vascular disease    • Staph infection     ABD- 2016- seen by imfectious disease in [place]   • Type 1 diabetes     dx age 6- checks fsbs 6 times per day        Past Surgical History:   Procedure Laterality Date   • AORTAGRAM N/A 12/20/2016    Procedure: AORTAGRAM WITH  RUNOFF;  Surgeon: Jose Enrique Brown MD;  Location: [facility] HYBRID OR 15;  Service:    • CHOLECYSTECTOMY     • FEMORAL FEMORAL BYPASS N/A 1/31/2017    Procedure: FEMORAL FEMORAL BYPASS, Right Femoral Endarterectomy, ;  Surgeon: Jose Enrique Brown MD;  Location: [facility] HYBRID OR 15;  Service:    • GASTROSTOMY     • HERNIA REPAIR      bilateral inguinal   • NECK SURGERY     • OTHER SURGICAL HISTORY      Enteroscopic PEJ Tube Placement   • TRIGGER FINGER RELEASE      5 YEARS AGO       No Known Allergies    Objective   Physical Exam   Constitutional: He is oriented to person, place, and time. He appears well-developed and well-nourished.   HENT:   Head: Normocephalic.   Eyes: Pupils are equal, round, and reactive to light.   Neurological: He is alert and oriented to person, place, and time.   Skin: Skin is warm.   Psychiatric: He has a normal mood and affect. His behavior is normal.   Vitals reviewed.    Ortho Exam  Ortho Exam  Right Lower extremity exam:  Vascular: Pulses are nonpalpable, painful edema is noted to all digits of the right foot. Right foot is cool to touch to about the midfoot level, no pedal hair is noted  Neuro: Gross sensations intact  Derm: There is some slightly raised erythematous and edematous patches along the lateral aspect of the foot potentially related to tinea.  His interspaces are much more dry now. The wound is present in the fourth interspace previously is driving callus now. There is no significant interdigital maceration or odor. The wound on the lateral aspect of the fourth digit is dry and appears to be healing and only a couple millimeters in diameter.  All his digits are extremely painful.  MSK: Gross orthopedic deformities are noted      Assessment/Plan  severe right lower extremity peripheral arterial disease, lumbar radiculopathy  Independent Review of Radiographic Studies:      Laboratory and Other Studies:     Medical Decision Making:        Procedures  Debridement Note    Joey  was seen today for wound check and follow-up.    Diagnoses and all orders for this visit:    Peripheral vascular disease    Other orders  -     pregabalin (LYRICA) 50 MG capsule; Take 1 capsule by mouth 3 (Three) Times a Day.        Recommendations/Plan:  I explained to him again multiple times that there is nothing I can do for him other than try to coordinate his referrals.  Have explained him multiple times that this is related to his back but more so his vasculature and that's not my specialty.  I can try to get him a second opinion on his vasculature and see if there is anyone he thinks there is anything else that can be done but I explained to him with his previous bypass and Dr. Brown thinks he is exhausted all treatment denied don't know if he really has much in way of any other options.  He asked me if this can get worse and I explained that yes the blood flow to his extremity could completely occluding clot off resulting in a cold or dead leg but again I have no way of knowing that her determining that it's just a risk with his vascular disease.  I recommend he follow up with Dr. petersen as scheduled and I'll see if there is another vascular surgeon willing to see him in the [facility] network and at that point it's up to the them if anything else can be done.  At the patient's request I'll give him one more refill for Lyrica.  There is no reason for the patient to continue to follow up with me at this point unless he develops any wounds which even at that point without adequate blood flow will be very difficult for me to treat to say the least.  We'll schedule him back when necessary and will notify him up for able to find another vascular referral.    Return if symptoms worsen or fail to improve.  Patient agreeable to call or return sooner for any concerns.

## 2017-06-22 ENCOUNTER — TELEPHONE (OUTPATIENT)
Dept: NEUROSURGERY | Facility: CLINIC | Age: 52
End: 2017-06-22

## 2017-06-22 NOTE — TELEPHONE ENCOUNTER
Elsie, our office did not order the Lyrica. That was ordered by Olman Lezama DPM at [facility]

## 2017-06-22 NOTE — TELEPHONE ENCOUNTER
Lyrica phoned in per patient's request.  This was ordered on 06/20/17.  The pharmacy did not receive it.  Unable to reach the patient.

## 2017-06-22 NOTE — TELEPHONE ENCOUNTER
Provider:  Chadd  Caller: Joey Saunders  Time of call:   01:04 PM  Phone #:  [phone]  Last visit:   06/13/17  Next visit: none    REMI:    05/18/2017 Hydrocodone/Acetaminophen  325MG/7.5MG 1965 #84 28 days Harjinder Rodriguez   06/09/2017 Lyrica 50MG 1965 #90 30 days Olman Lezama       Reason for call:       Pt called in and said that a medication was supposed to be called in to his pharmacy. Didn't say what    Attempted to call on cell #, no answer/no voicemail. Attempted to call on home #, invalid #.

## 2017-07-26 RX ORDER — PREGABALIN 50 MG/1
CAPSULE ORAL
Qty: 90 CAPSULE | Refills: 0 | OUTPATIENT
Start: 2017-07-26

## 2017-07-26 NOTE — TELEPHONE ENCOUNTER
Should get this from pcp or pain doctor. I have nothing to offer him treatment wise and he missed previous appts.

## 2017-09-05 DIAGNOSIS — IMO0002 UNCONTROLLED TYPE 1 DIABETES MELLITUS WITH COMPLICATION: ICD-10-CM

## 2017-09-05 RX ORDER — INSULIN ASPART 100 [IU]/ML
INJECTION, SOLUTION INTRAVENOUS; SUBCUTANEOUS
Qty: 10 PEN | Refills: 3 | Status: SHIPPED | OUTPATIENT
Start: 2017-09-05 | End: 2017-12-09 | Stop reason: SDUPTHER

## 2017-09-05 RX ORDER — SYRINGE-NEEDLE,INSULIN,0.5 ML 31 GX5/16"
SYRINGE, EMPTY DISPOSABLE MISCELLANEOUS
Qty: 200 EACH | Refills: 3 | Status: SHIPPED | OUTPATIENT
Start: 2017-09-05 | End: 2017-12-09 | Stop reason: SDUPTHER

## 2017-11-06 RX ORDER — PEN NEEDLE, DIABETIC 29 G X1/2"
NEEDLE, DISPOSABLE MISCELLANEOUS
Qty: 100 EACH | Refills: 0 | Status: SHIPPED | OUTPATIENT
Start: 2017-11-06 | End: 2017-11-13 | Stop reason: SDUPTHER

## 2017-11-10 ENCOUNTER — HOSPITAL ENCOUNTER (OUTPATIENT)
Dept: OTHER | Age: 52
Discharge: HOME | End: 2017-11-10
Attending: NURSE PRACTITIONER | Admitting: NURSE PRACTITIONER

## 2017-11-10 LAB
A/G RATIO: 1.4 (ref 0.8–2)
ALBUMIN SERPL-MCNC: 4.4 G/DL (ref 3.4–4.8)
ALP BLD-CCNC: 180 U/L (ref 25–100)
ALT SERPL-CCNC: 8 U/L (ref 4–36)
ANION GAP SERPL CALCULATED.3IONS-SCNC: 17 MMOL/L (ref 3–16)
AST SERPL-CCNC: 14 U/L (ref 8–33)
BASOPHILS ABSOLUTE: 0.1 K/UL (ref 0–0.1)
BASOPHILS RELATIVE PERCENT: 0.5 %
BILIRUB SERPL-MCNC: <0.2 MG/DL (ref 0.3–1.2)
BUN BLDV-MCNC: 11 MG/DL (ref 6–20)
CALCIUM SERPL-MCNC: 9.6 MG/DL (ref 8.5–10.5)
CHLORIDE BLD-SCNC: 99 MMOL/L (ref 98–107)
CO2: 26 MMOL/L (ref 20–30)
CREAT SERPL-MCNC: 0.8 MG/DL (ref 0.4–1.2)
EOSINOPHILS ABSOLUTE: 0.2 K/UL (ref 0–0.4)
EOSINOPHILS RELATIVE PERCENT: 2.6 %
GFR AFRICAN AMERICAN: >59
GFR NON-AFRICAN AMERICAN: >59
GLOBULIN: 3.1 G/DL
GLUCOSE BLD-MCNC: 44 MG/DL (ref 74–106)
HBA1C MFR BLD: 7.9 %
HCT VFR BLD CALC: 46.2 % (ref 40–54)
HEMOGLOBIN: 15.8 G/DL (ref 13–18)
LYMPHOCYTES ABSOLUTE: 3.3 K/UL (ref 1.5–4)
LYMPHOCYTES RELATIVE PERCENT: 35.1 % (ref 20–40)
MCH RBC QN AUTO: 28.6 PG (ref 27–32)
MCHC RBC AUTO-ENTMCNC: 34.2 G/DL (ref 31–35)
MCV RBC AUTO: 83.7 FL (ref 80–100)
MONOCYTES ABSOLUTE: 1.1 K/UL (ref 0.2–0.8)
MONOCYTES RELATIVE PERCENT: 11.3 % (ref 3–10)
NEUTROPHILS ABSOLUTE: 4.8 K/UL (ref 2–7.5)
NEUTROPHILS RELATIVE PERCENT: 50.5 %
PDW BLD-RTO: 14.7 % (ref 11–16)
PLATELET # BLD: 255 K/UL (ref 150–400)
PMV BLD AUTO: 11.9 FL (ref 6–10)
POTASSIUM SERPL-SCNC: 3.8 MMOL/L (ref 3.4–5.1)
RBC # BLD: 5.52 M/UL (ref 4.5–6)
SODIUM BLD-SCNC: 142 MMOL/L (ref 136–145)
TOTAL PROTEIN: 7.5 G/DL (ref 6.4–8.3)
WBC # BLD: 9.4 K/UL (ref 4–11)

## 2017-11-13 RX ORDER — PEN NEEDLE, DIABETIC 29 G X1/2"
NEEDLE, DISPOSABLE MISCELLANEOUS
Qty: 300 EACH | Refills: 3 | Status: SHIPPED | OUTPATIENT
Start: 2017-11-13 | End: 2017-12-09 | Stop reason: SDUPTHER

## 2017-11-22 ENCOUNTER — HOSPITAL ENCOUNTER (OUTPATIENT)
Dept: OTHER | Age: 52
Discharge: HOME | End: 2017-11-22
Attending: NURSE PRACTITIONER | Admitting: NURSE PRACTITIONER

## 2017-11-22 LAB
A/G RATIO: 1.4 (ref 0.8–2)
ALBUMIN SERPL-MCNC: 4.1 G/DL (ref 3.4–4.8)
ALP BLD-CCNC: 171 U/L (ref 25–100)
ALT SERPL-CCNC: 23 U/L (ref 4–36)
ANION GAP SERPL CALCULATED.3IONS-SCNC: 11 MMOL/L (ref 3–16)
AST SERPL-CCNC: 33 U/L (ref 8–33)
BASOPHILS ABSOLUTE: 0.1 K/UL (ref 0–0.1)
BASOPHILS RELATIVE PERCENT: 0.7 %
BILIRUB SERPL-MCNC: <0.2 MG/DL (ref 0.3–1.2)
BUN BLDV-MCNC: 11 MG/DL (ref 6–20)
C-REACTIVE PROTEIN: 8.1 MG/L (ref 0–5.1)
CALCIUM SERPL-MCNC: 9.2 MG/DL (ref 8.5–10.5)
CHLORIDE BLD-SCNC: 103 MMOL/L (ref 98–107)
CO2: 27 MMOL/L (ref 20–30)
CREAT SERPL-MCNC: 0.7 MG/DL (ref 0.4–1.2)
EOSINOPHILS ABSOLUTE: 0.2 K/UL (ref 0–0.4)
EOSINOPHILS RELATIVE PERCENT: 1.8 %
GFR AFRICAN AMERICAN: >59
GFR NON-AFRICAN AMERICAN: >59
GLOBULIN: 2.9 G/DL
GLUCOSE BLD-MCNC: 133 MG/DL (ref 74–106)
HCT VFR BLD CALC: 40.1 % (ref 40–54)
HEMOGLOBIN: 13.3 G/DL (ref 13–18)
LYMPHOCYTES ABSOLUTE: 1.8 K/UL (ref 1.5–4)
LYMPHOCYTES RELATIVE PERCENT: 22.6 % (ref 20–40)
MCH RBC QN AUTO: 28.8 PG (ref 27–32)
MCHC RBC AUTO-ENTMCNC: 33.2 G/DL (ref 31–35)
MCV RBC AUTO: 86.8 FL (ref 80–100)
MONOCYTES ABSOLUTE: 1.1 K/UL (ref 0.2–0.8)
MONOCYTES RELATIVE PERCENT: 12.9 % (ref 3–10)
NEUTROPHILS ABSOLUTE: 5.1 K/UL (ref 2–7.5)
NEUTROPHILS RELATIVE PERCENT: 62 %
PDW BLD-RTO: 15.2 % (ref 11–16)
PLATELET # BLD: 299 K/UL (ref 150–400)
PMV BLD AUTO: 11.3 FL (ref 6–10)
POTASSIUM SERPL-SCNC: 4.5 MMOL/L (ref 3.4–5.1)
RBC # BLD: 4.62 M/UL (ref 4.5–6)
SEDIMENTATION RATE, ERYTHROCYTE: 7 MM/HR (ref 0–10)
SODIUM BLD-SCNC: 141 MMOL/L (ref 136–145)
TOTAL PROTEIN: 7 G/DL (ref 6.4–8.3)
WBC # BLD: 8.2 K/UL (ref 4–11)

## 2017-12-08 ENCOUNTER — TELEPHONE (OUTPATIENT)
Dept: INTERNAL MEDICINE | Facility: CLINIC | Age: 52
End: 2017-12-08

## 2017-12-09 DIAGNOSIS — IMO0002 UNCONTROLLED TYPE 1 DIABETES MELLITUS WITH COMPLICATION: Primary | ICD-10-CM

## 2017-12-09 RX ORDER — BLOOD SUGAR DIAGNOSTIC
1 STRIP MISCELLANEOUS 2 TIMES DAILY
Qty: 200 EACH | Refills: 1 | Status: SHIPPED | OUTPATIENT
Start: 2017-12-09 | End: 2018-10-31 | Stop reason: SDUPTHER

## 2017-12-09 RX ORDER — INSULIN ASPART 100 [IU]/ML
INJECTION, SOLUTION INTRAVENOUS; SUBCUTANEOUS
Qty: 10 PEN | Refills: 1 | Status: SHIPPED | OUTPATIENT
Start: 2017-12-09 | End: 2018-01-03 | Stop reason: SDUPTHER

## 2017-12-09 RX ORDER — PEN NEEDLE, DIABETIC 29 G X1/2"
1 NEEDLE, DISPOSABLE MISCELLANEOUS 3 TIMES DAILY
Qty: 300 EACH | Refills: 1 | Status: SHIPPED | OUTPATIENT
Start: 2017-12-09 | End: 2018-01-03 | Stop reason: SDUPTHER

## 2017-12-29 RX ORDER — LANCETS
EACH MISCELLANEOUS
Qty: 100 EACH | Refills: 0 | Status: ON HOLD | OUTPATIENT
Start: 2017-12-29 | End: 2019-08-16

## 2017-12-29 RX ORDER — BLOOD-GLUCOSE CONTROL, NORMAL
EACH MISCELLANEOUS
Qty: 1 EACH | Refills: 0 | Status: ON HOLD | OUTPATIENT
Start: 2017-12-29 | End: 2019-08-16

## 2017-12-29 RX ORDER — BLOOD-GLUCOSE METER
EACH MISCELLANEOUS
Qty: 1 KIT | Refills: 0 | Status: SHIPPED | OUTPATIENT
Start: 2017-12-29 | End: 2018-01-03

## 2017-12-29 RX ORDER — ISOPROPYL ALCOHOL 0.75 G/1
SWAB TOPICAL
Qty: 100 EACH | Refills: 0 | Status: ON HOLD | OUTPATIENT
Start: 2017-12-29 | End: 2019-08-16

## 2017-12-29 NOTE — TELEPHONE ENCOUNTER
Received additional refill request for accu check test strips, lancets, and meter as well as control solution and alcohol swabs, from [facility] via fax, refilled ok per Dr. Monahan's message below, last refill without appt.

## 2018-01-03 DIAGNOSIS — IMO0002 UNCONTROLLED TYPE 1 DIABETES MELLITUS WITH COMPLICATION: Primary | ICD-10-CM

## 2018-01-03 RX ORDER — BLOOD-GLUCOSE CONTROL, NORMAL
EACH MISCELLANEOUS
Qty: 1 EACH | Refills: 1 | Status: ON HOLD | OUTPATIENT
Start: 2018-01-03 | End: 2019-08-16

## 2018-01-03 RX ORDER — ISOPROPYL ALCOHOL 0.75 G/1
1 SWAB TOPICAL
Qty: 400 EACH | Refills: 1 | Status: ON HOLD | OUTPATIENT
Start: 2018-01-03 | End: 2019-08-16

## 2018-01-03 RX ORDER — RIVAROXABAN 20 MG/1
TABLET, FILM COATED ORAL
Refills: 3 | Status: ON HOLD | COMMUNITY
Start: 2017-12-19 | End: 2019-08-16

## 2018-01-03 RX ORDER — LANCETS
1 EACH MISCELLANEOUS
Qty: 400 EACH | Refills: 1 | Status: ON HOLD | OUTPATIENT
Start: 2018-01-03 | End: 2019-08-16

## 2018-01-03 RX ORDER — POLYETHYLENE GLYCOL 3350 17 G/17G
POWDER, FOR SOLUTION ORAL
Refills: 0 | Status: ON HOLD | COMMUNITY
Start: 2017-11-10 | End: 2019-08-16

## 2018-01-03 RX ORDER — VARENICLINE TARTRATE 1 MG/1
TABLET, FILM COATED ORAL
Refills: 1 | Status: ON HOLD | COMMUNITY
Start: 2017-11-22 | End: 2019-08-16

## 2018-01-03 RX ORDER — METOPROLOL TARTRATE 50 MG/1
TABLET, FILM COATED ORAL
Refills: 3 | Status: ON HOLD | COMMUNITY
Start: 2017-12-20 | End: 2019-08-16

## 2018-01-03 RX ORDER — LISINOPRIL 10 MG/1
TABLET ORAL
Refills: 1 | Status: ON HOLD | COMMUNITY
Start: 2017-12-08 | End: 2019-08-16

## 2018-01-03 RX ORDER — BLOOD-GLUCOSE METER
1 EACH MISCELLANEOUS TAKE AS DIRECTED
Qty: 1 KIT | Refills: 0 | Status: ON HOLD | OUTPATIENT
Start: 2018-01-03 | End: 2019-08-16

## 2018-01-03 RX ORDER — PEN NEEDLE, DIABETIC 29 G X1/2"
1 NEEDLE, DISPOSABLE MISCELLANEOUS 3 TIMES DAILY
Qty: 300 EACH | Refills: 1 | Status: SHIPPED | OUTPATIENT
Start: 2018-01-03 | End: 2018-12-27 | Stop reason: SDUPTHER

## 2018-01-03 RX ORDER — BLOOD SUGAR DIAGNOSTIC
1 STRIP MISCELLANEOUS
Qty: 360 EACH | Refills: 1 | Status: SHIPPED | OUTPATIENT
Start: 2018-01-03 | End: 2018-03-07 | Stop reason: SDUPTHER

## 2018-01-03 RX ORDER — INSULIN ASPART 100 [IU]/ML
INJECTION, SOLUTION INTRAVENOUS; SUBCUTANEOUS
Qty: 10 PEN | Refills: 1 | Status: SHIPPED | OUTPATIENT
Start: 2018-01-03 | End: 2018-10-31 | Stop reason: SDUPTHER

## 2018-03-04 RX ORDER — SYRING-NEEDL,DISP,INSUL,0.3 ML 28GX1/2"
SYRINGE, EMPTY DISPOSABLE MISCELLANEOUS
Refills: 3 | OUTPATIENT
Start: 2018-03-04

## 2018-03-05 NOTE — TELEPHONE ENCOUNTER
Patient has not been seen since 11/16/2016. Will need an appt scheduled before refills can be sent in.

## 2018-03-07 DIAGNOSIS — IMO0002 UNCONTROLLED TYPE 1 DIABETES MELLITUS WITH COMPLICATION: ICD-10-CM

## 2018-03-07 RX ORDER — BLOOD SUGAR DIAGNOSTIC
STRIP MISCELLANEOUS
Refills: 0 | OUTPATIENT
Start: 2018-03-07

## 2018-03-07 RX ORDER — BLOOD SUGAR DIAGNOSTIC
1 STRIP MISCELLANEOUS
Qty: 360 EACH | Refills: 3 | Status: SHIPPED | OUTPATIENT
Start: 2018-03-07 | End: 2019-06-01 | Stop reason: SDUPTHER

## 2018-03-07 NOTE — TELEPHONE ENCOUNTER
Called and informed pt, per pt he is on bed rest after his leg procedure for 2 more weeks and will be able to call and schedule after that, test strips and syringes 28 G refilled electronically ok per Dr. Monahan.

## 2018-03-07 NOTE — TELEPHONE ENCOUNTER
THE PATIENT CALLED IN REGARDS GETTING A REFILL ON HIS TEST STRIPS. THE PATIENT WAS ADVISED THAT HE NEEDED TO BE SEEN BEFORE HE RECEIVED THE MEDICATION. AND THE PATIENT STATES DUE TO HIS LEG THAT HE WOULD HAVE A HARD TIME GETTING HERE. THE PATIENT WOULD LIKE TO GET A CALL BACK FROM DR REEDER [phone]

## 2018-03-07 NOTE — TELEPHONE ENCOUNTER
Brett Zamora,   Could you please refill test strips for patient 4 times a day (90 day supply) [employer] Mail order. 3 refills.   Please let patient know that although Rx has been filled, he has to be seen in clinic for all future prescriptions.   Thank you

## 2018-03-08 RX ORDER — ISOPROPYL ALCOHOL 0.75 G/1
SWAB TOPICAL
Qty: 100 EACH | Refills: 3 | Status: ON HOLD | OUTPATIENT
Start: 2018-03-08 | End: 2019-08-16

## 2018-03-08 RX ORDER — LANCETS
EACH MISCELLANEOUS
Qty: 100 EACH | Refills: 3 | Status: ON HOLD | OUTPATIENT
Start: 2018-03-08 | End: 2019-08-16

## 2018-03-13 NOTE — TELEPHONE ENCOUNTER
PLEASE SEND  SYRINGES 28 G 1/2 INCH SENT INTO [facility] IN [place], [place]. [facility] DID NOT RECEIVE THE PRESCRIPTION FOR THEM AND PT IS ABOUT OUT. HE HAS A 5 DAY SUPPLY.   HE ALSO WANTS TO APOLOGIZE FOR NOT COMING IN. HE KNOWS HE NEEDS TO COME IN BUT AS YOU KNOW IS UNABLE TO.

## 2018-03-14 ENCOUNTER — TELEPHONE (OUTPATIENT)
Dept: INTERNAL MEDICINE | Facility: CLINIC | Age: 53
End: 2018-03-14

## 2018-03-14 NOTE — TELEPHONE ENCOUNTER
"PATIENT CALLED BACK IN REGARDS TO HIS BD INSULIN SYRINGE -NEEDLE U-100 28G TIMES5/16\" 0.5 ML. HE WOULD LIKE FOR THIS SCRIPT TO BE SENT TO HIS LOCAL PHARMACY ( [facility]) INSTEAD OF HIS MAIL ORDER PHARMACY.IF THERE IS ANY QUESTIONS OR CONCERNS THE PATIENT CAN BE REACHED [phone]  "

## 2018-03-14 NOTE — TELEPHONE ENCOUNTER
PT CALLED IN WANTING TO TRY TO SPEAK WITH DR. REEDER. THE PT STATES HE RECENTLY SPOKE WITH DR. REEDER IN REGARDS TO GETTING  SYRINGES 28 G 1/2 IN REFILLED BUT HAS HAD TROUBLE RECEIVING IT AT THE [facility] THAT IS LISTED. THE PT ONLY HAS 5 LEFT AND IS AFRAID OF RUNNING OUT. PT BEST CALL BACK # [phone]

## 2018-03-15 RX ORDER — SYRING-NEEDL,DISP,INSUL,0.3 ML 28GX1/2"
SYRINGE, EMPTY DISPOSABLE MISCELLANEOUS
Qty: 200 EACH | Refills: 3 | Status: SHIPPED | OUTPATIENT
Start: 2018-03-15 | End: 2018-12-31

## 2018-07-25 ENCOUNTER — HOSPITAL ENCOUNTER (EMERGENCY)
Facility: HOSPITAL | Age: 53
Discharge: HOME | End: 2018-07-25
Attending: EMERGENCY MEDICINE
Payer: MEDICARE

## 2018-07-25 VITALS
WEIGHT: 136 LBS | BODY MASS INDEX: 20.14 KG/M2 | DIASTOLIC BLOOD PRESSURE: 93 MMHG | HEART RATE: 88 BPM | TEMPERATURE: 98.2 F | HEIGHT: 69 IN | SYSTOLIC BLOOD PRESSURE: 153 MMHG | OXYGEN SATURATION: 99 % | RESPIRATION RATE: 18 BRPM

## 2018-07-25 DIAGNOSIS — R11.2 NON-INTRACTABLE VOMITING WITH NAUSEA, UNSPECIFIED VOMITING TYPE: Primary | ICD-10-CM

## 2018-07-25 DIAGNOSIS — R73.09 LABILE BLOOD GLUCOSE: ICD-10-CM

## 2018-07-25 LAB
A/G RATIO: 1.2 (ref 0.8–2)
ALBUMIN SERPL-MCNC: 3.8 G/DL (ref 3.4–4.8)
ALP BLD-CCNC: 202 U/L (ref 25–100)
ALT SERPL-CCNC: 6 U/L (ref 4–36)
AMYLASE: 33 U/L (ref 20–104)
ANION GAP SERPL CALCULATED.3IONS-SCNC: 13 MMOL/L (ref 3–16)
AST SERPL-CCNC: 16 U/L (ref 8–33)
BASOPHILS ABSOLUTE: 0 K/UL (ref 0–0.1)
BASOPHILS RELATIVE PERCENT: 0.4 %
BILIRUB SERPL-MCNC: <0.2 MG/DL (ref 0.3–1.2)
BUN BLDV-MCNC: 10 MG/DL (ref 6–20)
CALCIUM SERPL-MCNC: 9 MG/DL (ref 8.5–10.5)
CHLORIDE BLD-SCNC: 98 MMOL/L (ref 98–107)
CO2: 24 MMOL/L (ref 20–30)
CREAT SERPL-MCNC: 0.7 MG/DL (ref 0.4–1.2)
EOSINOPHILS ABSOLUTE: 0.1 K/UL (ref 0–0.4)
EOSINOPHILS RELATIVE PERCENT: 1.1 %
GFR AFRICAN AMERICAN: >59
GFR NON-AFRICAN AMERICAN: >59
GLOBULIN: 3.2 G/DL
GLUCOSE BLD-MCNC: 152 MG/DL (ref 74–106)
HCT VFR BLD CALC: 36.5 % (ref 40–54)
HEMOGLOBIN: 11.1 G/DL (ref 13–18)
IMMATURE GRANULOCYTES #: 0 K/UL
IMMATURE GRANULOCYTES %: 0.3 % (ref 0–5)
LIPASE: 5 U/L (ref 5.6–51.3)
LYMPHOCYTES ABSOLUTE: 1.9 K/UL (ref 1.5–4)
LYMPHOCYTES RELATIVE PERCENT: 20 %
MCH RBC QN AUTO: 23 PG (ref 27–32)
MCHC RBC AUTO-ENTMCNC: 30.4 G/DL (ref 31–35)
MCV RBC AUTO: 75.6 FL (ref 80–100)
MONOCYTES ABSOLUTE: 0.7 K/UL (ref 0.2–0.8)
MONOCYTES RELATIVE PERCENT: 7.9 %
NEUTROPHILS ABSOLUTE: 6.6 K/UL (ref 2–7.5)
NEUTROPHILS RELATIVE PERCENT: 70.3 %
PDW BLD-RTO: 15.5 % (ref 11–16)
PLATELET # BLD: 391 K/UL (ref 150–400)
PMV BLD AUTO: 9.3 FL (ref 6–10)
POTASSIUM SERPL-SCNC: 4.2 MMOL/L (ref 3.4–5.1)
RBC # BLD: 4.83 M/UL (ref 4.5–6)
SODIUM BLD-SCNC: 135 MMOL/L (ref 136–145)
TOTAL PROTEIN: 7 G/DL (ref 6.4–8.3)
TROPONIN: <0.3 NG/ML
WBC # BLD: 9.4 K/UL (ref 4–11)

## 2018-07-25 PROCEDURE — 6360000002 HC RX W HCPCS: Performed by: EMERGENCY MEDICINE

## 2018-07-25 PROCEDURE — 96374 THER/PROPH/DIAG INJ IV PUSH: CPT

## 2018-07-25 PROCEDURE — 85025 COMPLETE CBC W/AUTO DIFF WBC: CPT

## 2018-07-25 PROCEDURE — 83690 ASSAY OF LIPASE: CPT

## 2018-07-25 PROCEDURE — 84484 ASSAY OF TROPONIN QUANT: CPT

## 2018-07-25 PROCEDURE — 36415 COLL VENOUS BLD VENIPUNCTURE: CPT

## 2018-07-25 PROCEDURE — 82150 ASSAY OF AMYLASE: CPT

## 2018-07-25 PROCEDURE — 99285 EMERGENCY DEPT VISIT HI MDM: CPT

## 2018-07-25 PROCEDURE — 80053 COMPREHEN METABOLIC PANEL: CPT

## 2018-07-25 PROCEDURE — 2580000003 HC RX 258: Performed by: EMERGENCY MEDICINE

## 2018-07-25 RX ORDER — SODIUM CHLORIDE 9 MG/ML
INJECTION, SOLUTION INTRAVENOUS CONTINUOUS
Status: DISCONTINUED | OUTPATIENT
Start: 2018-07-25 | End: 2018-07-26 | Stop reason: HOSPADM

## 2018-07-25 RX ORDER — ONDANSETRON 2 MG/ML
4 INJECTION INTRAMUSCULAR; INTRAVENOUS ONCE
Status: COMPLETED | OUTPATIENT
Start: 2018-07-25 | End: 2018-07-25

## 2018-07-25 RX ADMIN — SODIUM CHLORIDE: 9 INJECTION, SOLUTION INTRAVENOUS at 21:05

## 2018-07-25 RX ADMIN — ONDANSETRON HYDROCHLORIDE 4 MG: 2 INJECTION, SOLUTION INTRAMUSCULAR; INTRAVENOUS at 21:06

## 2018-07-26 ENCOUNTER — APPOINTMENT (OUTPATIENT)
Dept: GENERAL RADIOLOGY | Facility: HOSPITAL | Age: 53
End: 2018-07-26
Payer: MEDICARE

## 2018-07-26 ENCOUNTER — HOSPITAL ENCOUNTER (EMERGENCY)
Facility: HOSPITAL | Age: 53
Discharge: ANOTHER HOSPITAL | End: 2018-07-26
Attending: FAMILY MEDICINE
Payer: MEDICARE

## 2018-07-26 VITALS
BODY MASS INDEX: 20.14 KG/M2 | WEIGHT: 136 LBS | TEMPERATURE: 98.9 F | RESPIRATION RATE: 16 BRPM | OXYGEN SATURATION: 100 % | DIASTOLIC BLOOD PRESSURE: 62 MMHG | HEART RATE: 103 BPM | SYSTOLIC BLOOD PRESSURE: 103 MMHG | HEIGHT: 69 IN

## 2018-07-26 LAB
ANION GAP SERPL CALCULATED.3IONS-SCNC: 15 MMOL/L (ref 3–16)
BASOPHILS ABSOLUTE: 0.1 K/UL (ref 0–0.1)
BASOPHILS RELATIVE PERCENT: 0.4 %
BILIRUBIN URINE: NEGATIVE
BLOOD, URINE: ABNORMAL
BUN BLDV-MCNC: 12 MG/DL (ref 6–20)
CALCIUM SERPL-MCNC: 9 MG/DL (ref 8.5–10.5)
CHLORIDE BLD-SCNC: 97 MMOL/L (ref 98–107)
CLARITY: CLEAR
CO2: 22 MMOL/L (ref 20–30)
COLOR: YELLOW
CREAT SERPL-MCNC: 0.9 MG/DL (ref 0.4–1.2)
EOSINOPHILS ABSOLUTE: 0 K/UL (ref 0–0.4)
EOSINOPHILS RELATIVE PERCENT: 0.2 %
GFR AFRICAN AMERICAN: >59
GFR NON-AFRICAN AMERICAN: >59
GLUCOSE BLD-MCNC: 266 MG/DL (ref 74–106)
GLUCOSE BLD-MCNC: 339 MG/DL (ref 74–106)
GLUCOSE BLD-MCNC: 365 MG/DL (ref 74–106)
GLUCOSE BLD-MCNC: 388 MG/DL (ref 74–106)
GLUCOSE URINE: 500 MG/DL
HCT VFR BLD CALC: 38.3 % (ref 40–54)
HEMOGLOBIN: 11.6 G/DL (ref 13–18)
IMMATURE GRANULOCYTES #: 0 K/UL
IMMATURE GRANULOCYTES %: 0.2 % (ref 0–5)
KETONES, URINE: 15 MG/DL
LEUKOCYTE ESTERASE, URINE: NEGATIVE
LYMPHOCYTES ABSOLUTE: 1 K/UL (ref 1.5–4)
LYMPHOCYTES RELATIVE PERCENT: 7.8 %
MCH RBC QN AUTO: 22.8 PG (ref 27–32)
MCHC RBC AUTO-ENTMCNC: 30.3 G/DL (ref 31–35)
MCV RBC AUTO: 75.2 FL (ref 80–100)
MICROSCOPIC EXAMINATION: YES
MONOCYTES ABSOLUTE: 0.5 K/UL (ref 0.2–0.8)
MONOCYTES RELATIVE PERCENT: 4.2 %
NEUTROPHILS ABSOLUTE: 10.7 K/UL (ref 2–7.5)
NEUTROPHILS RELATIVE PERCENT: 87.2 %
NITRITE, URINE: NEGATIVE
PDW BLD-RTO: 15.7 % (ref 11–16)
PERFORMED ON: ABNORMAL
PH UA: 6.5
PLATELET # BLD: 436 K/UL (ref 150–400)
PMV BLD AUTO: 9.8 FL (ref 6–10)
POTASSIUM SERPL-SCNC: 4.4 MMOL/L (ref 3.4–5.1)
PROTEIN UA: NEGATIVE MG/DL
RBC # BLD: 5.09 M/UL (ref 4.5–6)
RBC UA: ABNORMAL /HPF (ref 0–2)
SODIUM BLD-SCNC: 134 MMOL/L (ref 136–145)
SPECIFIC GRAVITY UA: 1.01
URINE REFLEX TO CULTURE: ABNORMAL
URINE TYPE: ABNORMAL
UROBILINOGEN, URINE: 0.2 E.U./DL
WBC # BLD: 12.3 K/UL (ref 4–11)
WBC UA: ABNORMAL /HPF (ref 0–5)

## 2018-07-26 PROCEDURE — 6360000002 HC RX W HCPCS: Performed by: FAMILY MEDICINE

## 2018-07-26 PROCEDURE — 87186 SC STD MICRODIL/AGAR DIL: CPT

## 2018-07-26 PROCEDURE — 87077 CULTURE AEROBIC IDENTIFY: CPT

## 2018-07-26 PROCEDURE — 81001 URINALYSIS AUTO W/SCOPE: CPT

## 2018-07-26 PROCEDURE — 36415 COLL VENOUS BLD VENIPUNCTURE: CPT

## 2018-07-26 PROCEDURE — 85025 COMPLETE CBC W/AUTO DIFF WBC: CPT

## 2018-07-26 PROCEDURE — 87205 SMEAR GRAM STAIN: CPT

## 2018-07-26 PROCEDURE — 87070 CULTURE OTHR SPECIMN AEROBIC: CPT

## 2018-07-26 PROCEDURE — 96376 TX/PRO/DX INJ SAME DRUG ADON: CPT

## 2018-07-26 PROCEDURE — 6370000000 HC RX 637 (ALT 250 FOR IP): Performed by: FAMILY MEDICINE

## 2018-07-26 PROCEDURE — 96372 THER/PROPH/DIAG INJ SC/IM: CPT

## 2018-07-26 PROCEDURE — 87040 BLOOD CULTURE FOR BACTERIA: CPT

## 2018-07-26 PROCEDURE — 96375 TX/PRO/DX INJ NEW DRUG ADDON: CPT

## 2018-07-26 PROCEDURE — 80048 BASIC METABOLIC PNL TOTAL CA: CPT

## 2018-07-26 PROCEDURE — 96374 THER/PROPH/DIAG INJ IV PUSH: CPT

## 2018-07-26 PROCEDURE — 2580000003 HC RX 258: Performed by: FAMILY MEDICINE

## 2018-07-26 PROCEDURE — 87075 CULTR BACTERIA EXCEPT BLOOD: CPT

## 2018-07-26 PROCEDURE — 71045 X-RAY EXAM CHEST 1 VIEW: CPT

## 2018-07-26 PROCEDURE — 99285 EMERGENCY DEPT VISIT HI MDM: CPT

## 2018-07-26 RX ORDER — MORPHINE SULFATE 4 MG/ML
4 INJECTION, SOLUTION INTRAMUSCULAR; INTRAVENOUS ONCE
Status: COMPLETED | OUTPATIENT
Start: 2018-07-26 | End: 2018-07-26

## 2018-07-26 RX ORDER — ONDANSETRON 2 MG/ML
4 INJECTION INTRAMUSCULAR; INTRAVENOUS ONCE
Status: COMPLETED | OUTPATIENT
Start: 2018-07-26 | End: 2018-07-26

## 2018-07-26 RX ORDER — 0.9 % SODIUM CHLORIDE 0.9 %
1000 INTRAVENOUS SOLUTION INTRAVENOUS ONCE
Status: COMPLETED | OUTPATIENT
Start: 2018-07-26 | End: 2018-07-26

## 2018-07-26 RX ADMIN — ONDANSETRON HYDROCHLORIDE 4 MG: 2 INJECTION, SOLUTION INTRAMUSCULAR; INTRAVENOUS at 12:02

## 2018-07-26 RX ADMIN — MORPHINE SULFATE 4 MG: 4 INJECTION INTRAVENOUS at 16:52

## 2018-07-26 RX ADMIN — Medication 5 UNITS: at 16:24

## 2018-07-26 RX ADMIN — MORPHINE SULFATE 4 MG: 4 INJECTION INTRAVENOUS at 12:04

## 2018-07-26 RX ADMIN — SODIUM CHLORIDE 1000 ML: 9 INJECTION, SOLUTION INTRAVENOUS at 12:02

## 2018-07-26 RX ADMIN — MORPHINE SULFATE 4 MG: 4 INJECTION INTRAVENOUS at 13:46

## 2018-07-26 ASSESSMENT — PAIN DESCRIPTION - DESCRIPTORS: DESCRIPTORS: BURNING;THROBBING

## 2018-07-26 ASSESSMENT — ENCOUNTER SYMPTOMS
EYE REDNESS: 0
SINUS PRESSURE: 0
COLOR CHANGE: 0
RECTAL PAIN: 0
NAUSEA: 1
TROUBLE SWALLOWING: 0
SORE THROAT: 0
COUGH: 0
PHOTOPHOBIA: 0
RHINORRHEA: 0
VOMITING: 1
CHOKING: 0
NAUSEA: 1
SORE THROAT: 0
SHORTNESS OF BREATH: 0
ANAL BLEEDING: 0
ABDOMINAL PAIN: 0
BLOOD IN STOOL: 0
ABDOMINAL PAIN: 1
EYE PAIN: 0
APNEA: 0
VOMITING: 1
EYE DISCHARGE: 0
EYE ITCHING: 0
DIARRHEA: 0
CONSTIPATION: 0

## 2018-07-26 ASSESSMENT — PAIN DESCRIPTION - FREQUENCY: FREQUENCY: CONTINUOUS

## 2018-07-26 ASSESSMENT — PAIN SCALES - GENERAL
PAINLEVEL_OUTOF10: 8
PAINLEVEL_OUTOF10: 7
PAINLEVEL_OUTOF10: 8

## 2018-07-26 ASSESSMENT — PAIN DESCRIPTION - ORIENTATION: ORIENTATION: RIGHT

## 2018-07-26 ASSESSMENT — PAIN DESCRIPTION - PAIN TYPE
TYPE: ACUTE PAIN
TYPE: ACUTE PAIN

## 2018-07-26 ASSESSMENT — PAIN DESCRIPTION - LOCATION: LOCATION: LEG

## 2018-07-26 NOTE — ED NOTES
Ems called for pt transport back to home     Chinmay Gonzalez, [address] [facility]  07/25/18 3552

## 2018-07-26 NOTE — ED PROVIDER NOTES
[address] [place] [address] Court  eMERGENCY dEPARTMENT eNCOUnter      Pt Name: Colton Bryant  MRN: [id number]  [place] 1965  Date of evaluation: 7/26/2018  Provider: Melanie Dixon MD    [address] [place] [address]       Chief Complaint   Patient presents with    Hyperglycemia         HISTORY OF PRESENT ILLNESS   (Location/Symptom, Timing/Onset, Context/Setting, Quality, Duration, Modifying Factors, Severity)  Note limiting factors. Colton Bryant is a 52 y.o. male who presents to the emergency department complaining about persistent nausea and vomiting since early this morning along with elevated blood sugars. Patient states he feels like he is running a fever. He was seen here last night with an elevated blood sugar and after receiving IV fluids he was sent home with a blood glucose around 135. He states he felt alright for a while but then early this morning he began to vomit. He is 2 months status post right BKA the medial part of which has been left open and the patient packs it every day. Nursing Notes were reviewed. REVIEW OF SYSTEMS    (2-9 systems for level 4, 10 or more for level 5)     Review of Systems   Constitutional: Positive for chills and fever. Negative for diaphoresis. HENT: Negative for sore throat. Respiratory: Negative for cough and shortness of breath. Cardiovascular: Negative for chest pain. Gastrointestinal: Positive for abdominal pain, nausea and vomiting. Genitourinary: Negative for flank pain. Musculoskeletal: Positive for arthralgias. Skin: Positive for wound. Except as noted above the remainder of the review of systems was reviewed and negative.        PAST MEDICAL HISTORY     Past Medical History:   Diagnosis Date    Cancer [facility])     breast left    Cancer ([facility] [address]     stomach    Gastroparesis 2013    GERD (gastroesophageal reflux disease)     Hypertension     Neuropathy (HCC)     Type II or unspecified type diabetes mellitus without mention of

## 2018-07-26 NOTE — ED NOTES
Patient with eyes closed when entered room to recheck patients blood sugar at this time.      Belen Barron RN  07/26/18 4279

## 2018-07-26 NOTE — ED NOTES
Received call from ems  They informed that it will be a while for transport back home as they have an ambulance out of [facility] at this time     Kike Kearns, [address] [facility]  07/25/18 3404

## 2018-07-26 NOTE — ED NOTES
[facility] to give report at this time.      Sahil Garcia RN  07/26/18 9660

## 2018-07-26 NOTE — ED PROVIDER NOTES
[address] [place] [address] Court  eMERGENCY dEPARTMENT eNCOUnter      Pt Name: Bettina Watkins  MRN: [id number]  [place] 1965  Date of evaluation: 7/25/2018  Provider: Madalyn Aponte MD    [address] [place] [address]       Chief Complaint   Patient presents with    Emesis     x 2 days    Hypoglycemia         HISTORY OF PRESENT ILLNESS   (Location/Symptom, Timing/Onset, Context/Setting, Quality, Duration, Modifying Factors, Severity)  Note limiting factors. Bettina Watkins is a 52 y.o. male who presents to the emergency department with complaints of labile blood glc and vomiting. He has had insulin-requiring DM since age 4. EMS reports being called to his home approx 1 hr ago for glc 40. He was able to take oral glc with improvement in his symptoms. He checked it later and noted 100. He called EMS back, however, due to vomiting since this morning. EMS noted an OTBS of 193. Pt is s/p partial gastrectomy and kellie. He report N/V today, no fever or chills, diarrhea. He states he has only eaten a biscuit and gravy and 2 cookies today; he took Humulin N, 5 units this morning (instead of usual 14). He did not take any humalog. He states he adjusts his N and humalog on his own. He has an abrasion at his R knee from falling on his carpet 2 days ago with a low glc. He is s/p R BKA approx 1 month ago and states he has no pain or concerns. He states he is doing dressing changes himself, with home health due to come tomorrow to change it. Nursing Notes were reviewed. REVIEW OF SYSTEMS    (2-9 systems for level 4, 10 or more for level 5)     Review of Systems   Constitutional: Negative for activity change, chills, fatigue, fever and unexpected weight change. HENT: Negative for congestion, ear pain, rhinorrhea, sinus pressure, sore throat and trouble swallowing. Eyes: Negative for photophobia, pain, discharge, redness, itching and visual disturbance. Respiratory: Negative for apnea and choking.     Cardiovascular: discolored yellow.  Mild erythema noted at R medial knee (pt states this is unchanged); NTTP       DIAGNOSTIC RESULTS     EKG: All EKG's are interpreted by the Emergency Department Physician who either signs or Co-signs this chart in the absence of a cardiologist.        RADIOLOGY:   Non-plain film images such as CT, Ultrasound and MRI are read by the radiologist. Plain radiographic images are visualized and preliminarily interpreted by the emergency physician with the below findings:        Interpretation per the Radiologist below, if available at the time of this note:    No orders to display         ED BEDSIDE ULTRASOUND:   Performed by ED Physician - none    LABS:  Labs Reviewed   CBC WITH AUTO DIFFERENTIAL - Abnormal; Notable for the following:        Result Value    Hemoglobin 11.1 (*)     Hematocrit 36.5 (*)     MCV 75.6 (*)     MCH 23.0 (*)     MCHC 30.4 (*)     All other components within normal limits    Narrative:     Performed at:  [address] [place] [address] Laboratory  [address] [place] [address]  [place], [place] [address]   Phone [phone]   COMPREHENSIVE METABOLIC PANEL - Abnormal; Notable for the following:     Sodium 135 (*)     Glucose 152 (*)     Total Bilirubin <0.2 (*)     Alkaline Phosphatase 202 (*)     All other components within normal limits    Narrative:     Performed at:  [address] [place] [address] Laboratory  [address] [place] [address]  [place], [place] [address]   Phone [phone]   LIPASE - Abnormal; Notable for the following:     Lipase 5.0 (*)     All other components within normal limits    Narrative:     Performed at:  [address] [place] [address] Laboratory  [address] [place], [place] [address], [place] [address] [address]   Phone [phone]   AMYLASE    Narrative:     Performed at:  [address] [place] [address] Laboratory  [address] [place], [place] [address], [place] [address] [address]   Phone [phone]   TROPONIN    Narrative:     Performed at:  [address] [place] [address] and [facility]  [address] [facility],  [place], [place] [address] [address]   Phone [phone]       All other labs were within normal range or not returned as of this dictation. EMERGENCY DEPARTMENT COURSE and DIFFERENTIAL DIAGNOSIS/MDM:   Vitals:    Vitals:    07/25/18 2115 07/25/18 2130 07/25/18 2145 07/25/18 2159   BP: (!) 153/92 (!) 154/83 (!) 153/93 (!) 153/93   Pulse: 87  99 88   Resp:    18   Temp:    98.2 °F (36.8 °C)   TempSrc:    Oral   SpO2: 99%  100% 99%   Weight:       Height:           PATIENT RECHECK:   7/25/18 9:32 PM   Pt is advised of normal labs. He reiterates that BKA stump appears stable. He declines offer for po phenergan tab to go. He declines offer for adm for obs. He states he can call 911 if problems. He is advised to eat PB/crackers or some prot/carbs even if he is not hungry. He stated his son was to come pick him up, said he has no phone, asks if he can go back home by EMS as before. CRITICAL CARE TIME   Total Critical Care time was 0 minutes, excluding separately reportable procedures. There was a high probability of clinically significant/life threatening deterioration in the patient's condition which required my urgent intervention. CONSULTS:  None    PROCEDURES:  None    FINAL IMPRESSION      1. Non-intractable vomiting with nausea, unspecified vomiting type    2. Labile blood glucose          DISPOSITION/PLAN   DISPOSITION Decision To Discharge 07/25/2018 09:35:08 PM      PATIENT REFERRED TO:  OXANA Griffith - CNP  [address] [place] [address]  [phone]      As needed.  Keep your appt on 7/29 as scheduled with Orthopedics      DISCHARGE MEDICATIONS:  Discharge Medication List as of 7/25/2018  9:37 PM          (Please note that portions of this note were completed with a voice recognition program.  Efforts were made to edit the dictations but occasionally words are mis-transcribed.)    Graeme Holloway MD (electronically signed)  Attending Emergency Physician

## 2018-07-26 NOTE — ED NOTES
Placed call to  OF [facility] for them to get in touch with the hospitalist at [facility] concerning patient for possible admission. Spoke with Marshall Chau, she said she would send page out to the physician and return call back to us.      Rossana Kumari  07/26/18 6262

## 2018-07-26 NOTE — ED NOTES
Reviewed discharge plan with pt. Encouraged f/u with pcp and ortho and continue with wound care and home health. Advised to monitor blood glucose and record and to eat carb/protein snacks.   Agreement stated     Tatiana Bryan RN  07/25/18 5086

## 2018-07-26 NOTE — ED NOTES
Patient also states that he has been vomiting since about 0100 this am after going home, states he felt good until then.      Michelle Joaquin RN  07/26/18 8603

## 2018-07-26 NOTE — ED NOTES
Pt aware of wait.   He will attempt to find another way to get home will advise if he is successful     Ayaka Esposito RN  07/25/18 6210

## 2018-07-31 LAB
ANAEROBIC CULTURE: ABNORMAL
BLOOD CULTURE, ROUTINE: NORMAL
GRAM STAIN RESULT: ABNORMAL
ORGANISM: ABNORMAL
WOUND/ABSCESS: ABNORMAL

## 2018-08-21 ENCOUNTER — HOSPITAL ENCOUNTER (OUTPATIENT)
Dept: NURSING | Facility: HOSPITAL | Age: 53
Setting detail: INFUSION SERIES
End: 2018-08-21
Payer: MEDICARE

## 2018-08-22 ENCOUNTER — HOSPITAL ENCOUNTER (OUTPATIENT)
Dept: NURSING | Facility: HOSPITAL | Age: 53
Setting detail: INFUSION SERIES
Discharge: HOME | End: 2018-08-24
Payer: MEDICARE

## 2018-08-22 VITALS
HEART RATE: 87 BPM | TEMPERATURE: 97.4 F | SYSTOLIC BLOOD PRESSURE: 152 MMHG | OXYGEN SATURATION: 100 % | DIASTOLIC BLOOD PRESSURE: 91 MMHG | RESPIRATION RATE: 18 BRPM

## 2018-08-22 NOTE — PROGRESS NOTES
Bárbara at Dr Orion Julio contacted nurses station. [place] spoke with Fiordaliza Gu regarding pt. RN unable to get to phone due to pt care. RN contacted Fiordaliza Gu back. Office concerned why nursing didn't resolve issue with pt leaving. Pharmacist contacted office after issue happened. Pharmacist tried to reach pt and unable to do so. RN stated to Fiordaliza Gu that pt told RN that she could not administer IV medications due to BP.

## 2018-08-22 NOTE — PROGRESS NOTES
Pt here for out pt infusion at 1518. Pt roomed in rm 12. RN and aid to pt room. Pt vital signs completed. Pt states his BP is too high to have medication. Pt states at [facility], medication would not be given if BP was high. RN called pharmacy at [phone]. Pharmacist on acall. RN spoke with pharmacy [email or web address]. Tech states pharmacist to call RN back. RN to call pt Dr at [place] [facility]. RN received another call at this time. RN spoke to PA at this time. Pharmacist came to find RN. Pharmacist states pt not in room. RN looking for pt. Aid states pt said he would be right back. Pt not in facility at this time.

## 2018-08-23 ENCOUNTER — HOSPITAL ENCOUNTER (OUTPATIENT)
Dept: NURSING | Facility: HOSPITAL | Age: 53
Setting detail: INFUSION SERIES
Discharge: HOME | End: 2018-08-25
Payer: MEDICARE

## 2018-08-23 PROCEDURE — 2580000003 HC RX 258: Performed by: INTERNAL MEDICINE

## 2018-08-23 PROCEDURE — 96367 TX/PROPH/DG ADDL SEQ IV INF: CPT

## 2018-08-23 PROCEDURE — 96365 THER/PROPH/DIAG IV INF INIT: CPT

## 2018-08-23 PROCEDURE — 6360000002 HC RX W HCPCS: Performed by: INTERNAL MEDICINE

## 2018-08-23 RX ADMIN — WATER 2 G: 1 INJECTION INTRAMUSCULAR; INTRAVENOUS; SUBCUTANEOUS at 13:42

## 2018-08-23 RX ADMIN — DAPTOMYCIN 400 MG: 500 INJECTION, POWDER, LYOPHILIZED, FOR SOLUTION INTRAVENOUS at 13:59

## 2018-08-24 ENCOUNTER — HOSPITAL ENCOUNTER (OUTPATIENT)
Dept: NURSING | Facility: HOSPITAL | Age: 53
Setting detail: INFUSION SERIES
Discharge: HOME | End: 2018-08-26
Payer: MEDICARE

## 2018-08-24 VITALS
HEART RATE: 90 BPM | RESPIRATION RATE: 16 BRPM | OXYGEN SATURATION: 98 % | TEMPERATURE: 98.7 F | SYSTOLIC BLOOD PRESSURE: 166 MMHG | DIASTOLIC BLOOD PRESSURE: 81 MMHG

## 2018-08-24 PROCEDURE — 6360000002 HC RX W HCPCS: Performed by: INTERNAL MEDICINE

## 2018-08-24 PROCEDURE — 2580000003 HC RX 258: Performed by: INTERNAL MEDICINE

## 2018-08-24 PROCEDURE — 96374 THER/PROPH/DIAG INJ IV PUSH: CPT

## 2018-08-24 PROCEDURE — 96365 THER/PROPH/DIAG IV INF INIT: CPT

## 2018-08-24 RX ADMIN — DAPTOMYCIN 400 MG: 500 INJECTION, POWDER, LYOPHILIZED, FOR SOLUTION INTRAVENOUS at 13:08

## 2018-08-24 RX ADMIN — CEFTRIAXONE SODIUM 2 G: 2 INJECTION, POWDER, FOR SOLUTION INTRAMUSCULAR; INTRAVENOUS at 12:45

## 2018-08-26 ENCOUNTER — HOSPITAL ENCOUNTER (OUTPATIENT)
Dept: NURSING | Facility: HOSPITAL | Age: 53
Setting detail: INFUSION SERIES
Discharge: HOME | End: 2018-08-28
Payer: MEDICARE

## 2018-08-26 VITALS
TEMPERATURE: 98.3 F | DIASTOLIC BLOOD PRESSURE: 77 MMHG | SYSTOLIC BLOOD PRESSURE: 133 MMHG | RESPIRATION RATE: 16 BRPM | HEART RATE: 99 BPM

## 2018-08-26 PROCEDURE — 2580000003 HC RX 258: Performed by: INTERNAL MEDICINE

## 2018-08-26 PROCEDURE — 6360000002 HC RX W HCPCS: Performed by: INTERNAL MEDICINE

## 2018-08-26 RX ADMIN — CEFTRIAXONE SODIUM 2 G: 2 INJECTION, POWDER, FOR SOLUTION INTRAMUSCULAR; INTRAVENOUS at 15:08

## 2018-08-26 RX ADMIN — DAPTOMYCIN 400 MG: 500 INJECTION, POWDER, LYOPHILIZED, FOR SOLUTION INTRAVENOUS at 15:09

## 2018-08-26 NOTE — PROGRESS NOTES
Pt here for antibiotics. Pt placed in RM#12 and vitals taken. Pt PICC line flushed and flushes well.

## 2018-08-29 ENCOUNTER — HOSPITAL ENCOUNTER (OUTPATIENT)
Dept: NURSING | Facility: HOSPITAL | Age: 53
Setting detail: INFUSION SERIES
Discharge: HOME | End: 2018-08-31
Payer: MEDICARE

## 2018-08-29 VITALS
RESPIRATION RATE: 16 BRPM | DIASTOLIC BLOOD PRESSURE: 80 MMHG | OXYGEN SATURATION: 99 % | SYSTOLIC BLOOD PRESSURE: 136 MMHG | TEMPERATURE: 98.3 F | HEART RATE: 90 BPM

## 2018-08-29 DIAGNOSIS — T87.40 AMPUTATION STUMP INFECTION (HCC): Primary | ICD-10-CM

## 2018-08-29 DIAGNOSIS — T87.40 AMPUTATION STUMP INFECTION (HCC): ICD-10-CM

## 2018-08-29 LAB
A/G RATIO: 1.6 (ref 0.8–2)
ALBUMIN SERPL-MCNC: 4.2 G/DL (ref 3.4–4.8)
ALP BLD-CCNC: 148 U/L (ref 25–100)
ALT SERPL-CCNC: 11 U/L (ref 4–36)
ANION GAP SERPL CALCULATED.3IONS-SCNC: 10 MMOL/L (ref 3–16)
AST SERPL-CCNC: 20 U/L (ref 8–33)
BILIRUB SERPL-MCNC: <0.2 MG/DL (ref 0.3–1.2)
BUN BLDV-MCNC: 13 MG/DL (ref 6–20)
CALCIUM SERPL-MCNC: 9.4 MG/DL (ref 8.5–10.5)
CHLORIDE BLD-SCNC: 103 MMOL/L (ref 98–107)
CO2: 26 MMOL/L (ref 20–30)
CREAT SERPL-MCNC: 0.6 MG/DL (ref 0.4–1.2)
GFR AFRICAN AMERICAN: >59
GFR NON-AFRICAN AMERICAN: >59
GLOBULIN: 2.7 G/DL
GLUCOSE BLD-MCNC: 174 MG/DL (ref 74–106)
HCT VFR BLD CALC: 33.4 % (ref 40–54)
HEMOGLOBIN: 9.9 G/DL (ref 13–18)
MCH RBC QN AUTO: 22 PG (ref 27–32)
MCHC RBC AUTO-ENTMCNC: 29.6 G/DL (ref 31–35)
MCV RBC AUTO: 74.4 FL (ref 80–100)
PDW BLD-RTO: 17.8 % (ref 11–16)
PLATELET # BLD: 288 K/UL (ref 150–400)
PMV BLD AUTO: 10.2 FL (ref 6–10)
POTASSIUM SERPL-SCNC: 4.4 MMOL/L (ref 3.4–5.1)
RBC # BLD: 4.49 M/UL (ref 4.5–6)
SODIUM BLD-SCNC: 139 MMOL/L (ref 136–145)
TOTAL CK: 41 U/L (ref 26–174)
TOTAL PROTEIN: 6.9 G/DL (ref 6.4–8.3)
WBC # BLD: 8.7 K/UL (ref 4–11)

## 2018-08-29 PROCEDURE — 96365 THER/PROPH/DIAG IV INF INIT: CPT

## 2018-08-29 PROCEDURE — 96374 THER/PROPH/DIAG INJ IV PUSH: CPT

## 2018-08-29 PROCEDURE — 80053 COMPREHEN METABOLIC PANEL: CPT

## 2018-08-29 PROCEDURE — 36415 COLL VENOUS BLD VENIPUNCTURE: CPT

## 2018-08-29 PROCEDURE — 6360000002 HC RX W HCPCS: Performed by: INTERNAL MEDICINE

## 2018-08-29 PROCEDURE — 85027 COMPLETE CBC AUTOMATED: CPT

## 2018-08-29 PROCEDURE — 2580000003 HC RX 258: Performed by: INTERNAL MEDICINE

## 2018-08-29 PROCEDURE — 82550 ASSAY OF CK (CPK): CPT

## 2018-08-29 RX ADMIN — DAPTOMYCIN 400 MG: 500 INJECTION, POWDER, LYOPHILIZED, FOR SOLUTION INTRAVENOUS at 10:53

## 2018-08-29 RX ADMIN — WATER 2 G: 1 INJECTION INTRAMUSCULAR; INTRAVENOUS; SUBCUTANEOUS at 10:52

## 2018-08-29 NOTE — PROGRESS NOTES
Pt here for outpt abx. Pt vitals completed. Pt abx given. Pt PICC line dressing changed via sterile technique. Pt PICC line 1.5 cm out at insertion site. Pt blood drawn from red port. Red and purple ports flushed without difficulty. Pt vitals completed. RN notes no signs of distress. Pt states he was going outside and would be back for his lab results.

## 2018-09-04 ENCOUNTER — HOSPITAL ENCOUNTER (OUTPATIENT)
Dept: NURSING | Facility: HOSPITAL | Age: 53
Setting detail: INFUSION SERIES
Discharge: HOME | End: 2018-09-06
Payer: MEDICARE

## 2018-09-04 VITALS
OXYGEN SATURATION: 100 % | RESPIRATION RATE: 16 BRPM | TEMPERATURE: 98.3 F | HEART RATE: 98 BPM | DIASTOLIC BLOOD PRESSURE: 88 MMHG | SYSTOLIC BLOOD PRESSURE: 129 MMHG

## 2018-09-04 LAB
A/G RATIO: 1.4 (ref 0.8–2)
ALBUMIN SERPL-MCNC: 4 G/DL (ref 3.4–4.8)
ALP BLD-CCNC: 143 U/L (ref 25–100)
ALT SERPL-CCNC: 13 U/L (ref 4–36)
ANION GAP SERPL CALCULATED.3IONS-SCNC: 11 MMOL/L (ref 3–16)
AST SERPL-CCNC: 19 U/L (ref 8–33)
BILIRUB SERPL-MCNC: <0.2 MG/DL (ref 0.3–1.2)
BUN BLDV-MCNC: 7 MG/DL (ref 6–20)
CALCIUM SERPL-MCNC: 9.2 MG/DL (ref 8.5–10.5)
CHLORIDE BLD-SCNC: 104 MMOL/L (ref 98–107)
CO2: 25 MMOL/L (ref 20–30)
CREAT SERPL-MCNC: 0.6 MG/DL (ref 0.4–1.2)
GFR AFRICAN AMERICAN: >59
GFR NON-AFRICAN AMERICAN: >59
GLOBULIN: 2.9 G/DL
GLUCOSE BLD-MCNC: 223 MG/DL (ref 74–106)
HCT VFR BLD CALC: 32.8 % (ref 40–54)
HEMOGLOBIN: 9.6 G/DL (ref 13–18)
MCH RBC QN AUTO: 21.5 PG (ref 27–32)
MCHC RBC AUTO-ENTMCNC: 29.3 G/DL (ref 31–35)
MCV RBC AUTO: 73.5 FL (ref 80–100)
PDW BLD-RTO: 17.6 % (ref 11–16)
PLATELET # BLD: 203 K/UL (ref 150–400)
PMV BLD AUTO: 10.6 FL (ref 6–10)
POTASSIUM SERPL-SCNC: 4.2 MMOL/L (ref 3.4–5.1)
RBC # BLD: 4.46 M/UL (ref 4.5–6)
SODIUM BLD-SCNC: 140 MMOL/L (ref 136–145)
TOTAL CK: 63 U/L (ref 26–174)
TOTAL PROTEIN: 6.9 G/DL (ref 6.4–8.3)
WBC # BLD: 8.4 K/UL (ref 4–11)

## 2018-09-04 PROCEDURE — 96365 THER/PROPH/DIAG IV INF INIT: CPT

## 2018-09-04 PROCEDURE — 6360000002 HC RX W HCPCS: Performed by: INTERNAL MEDICINE

## 2018-09-04 PROCEDURE — 85027 COMPLETE CBC AUTOMATED: CPT

## 2018-09-04 PROCEDURE — 2580000003 HC RX 258: Performed by: INTERNAL MEDICINE

## 2018-09-04 PROCEDURE — 80053 COMPREHEN METABOLIC PANEL: CPT

## 2018-09-04 PROCEDURE — 82550 ASSAY OF CK (CPK): CPT

## 2018-09-04 PROCEDURE — 96374 THER/PROPH/DIAG INJ IV PUSH: CPT

## 2018-09-04 RX ADMIN — DAPTOMYCIN 400 MG: 500 INJECTION, POWDER, LYOPHILIZED, FOR SOLUTION INTRAVENOUS at 09:55

## 2018-09-04 RX ADMIN — WATER 2 G: 1 INJECTION INTRAMUSCULAR; INTRAVENOUS; SUBCUTANEOUS at 09:50

## 2018-09-04 NOTE — PROGRESS NOTES
IV abx completed, PICC flushed with 20 ml NS. Patient wanting to leave, does not want to wait for vitals or wait time. Patient discharged home at this time.

## 2018-09-04 NOTE — PROGRESS NOTES
Patient arrives for OP IV abx treatment. Patient states he is to have labs drawn today before abx are started.

## 2018-09-04 NOTE — PROGRESS NOTES
PICC line to Left ac accessed, site intact and patent. 10 ml of blood drawn and wasted. Blood collected and sent to lab.

## 2018-09-19 ENCOUNTER — HOSPITAL ENCOUNTER (OUTPATIENT)
Dept: NURSING | Facility: HOSPITAL | Age: 53
Setting detail: INFUSION SERIES
Discharge: HOME | End: 2018-09-21
Payer: MEDICARE

## 2018-09-19 VITALS — OXYGEN SATURATION: 100 % | HEART RATE: 92 BPM | DIASTOLIC BLOOD PRESSURE: 82 MMHG | SYSTOLIC BLOOD PRESSURE: 135 MMHG

## 2018-09-19 PROCEDURE — 2580000003 HC RX 258: Performed by: INTERNAL MEDICINE

## 2018-09-19 RX ORDER — SODIUM CHLORIDE 0.9 % (FLUSH) 0.9 %
10 SYRINGE (ML) INJECTION ONCE
Status: COMPLETED | OUTPATIENT
Start: 2018-09-19 | End: 2018-09-19

## 2018-09-19 RX ADMIN — Medication 10 ML: at 13:50

## 2018-09-19 NOTE — FLOWSHEET NOTE
09/19/18 1336   PICC Double Lumen 08/29/18   Placement Date/Time: (c) 08/29/18 (c) 1130   Pre-existing: Yes   Continued need for line? Yes   Is this a power PICC? Yes   Site Assessment Clean;Dry; Intact   Red Lumen Status Flushed;Alcohol cap applied   Purple Lumen Status Brisk blood return;Flushed;Cap changed; Intermittent infusions   Exposed Catheter (cm) 1.5 cm   Line Care Connections checked and tightened   Dressing Status Changed   Dressing Type Transparent; Anti-microbial patch; Securing device   Dressing Change Due 09/26/18   Dressing Intervention Dressing changed;New   Dressing changed per sterile technique. Line flushed with normal saline. Patient left unit in wheelchair, no distress noted.

## 2018-10-16 ENCOUNTER — HOSPITAL ENCOUNTER (EMERGENCY)
Facility: HOSPITAL | Age: 53
Discharge: HOME | End: 2018-10-16
Attending: EMERGENCY MEDICINE
Payer: MEDICARE

## 2018-10-16 VITALS
TEMPERATURE: 98.5 F | BODY MASS INDEX: 20.73 KG/M2 | OXYGEN SATURATION: 100 % | HEIGHT: 69 IN | SYSTOLIC BLOOD PRESSURE: 147 MMHG | WEIGHT: 140 LBS | HEART RATE: 101 BPM | DIASTOLIC BLOOD PRESSURE: 78 MMHG | RESPIRATION RATE: 18 BRPM

## 2018-10-16 DIAGNOSIS — S81.811A LACERATION OF RIGHT LOWER EXTREMITY, INITIAL ENCOUNTER: Primary | ICD-10-CM

## 2018-10-16 PROCEDURE — 12001 RPR S/N/AX/GEN/TRNK 2.5CM/<: CPT

## 2018-10-16 PROCEDURE — 99284 EMERGENCY DEPT VISIT MOD MDM: CPT

## 2018-10-16 PROCEDURE — 6370000000 HC RX 637 (ALT 250 FOR IP): Performed by: EMERGENCY MEDICINE

## 2018-10-16 PROCEDURE — 2500000003 HC RX 250 WO HCPCS: Performed by: EMERGENCY MEDICINE

## 2018-10-16 RX ORDER — LIDOCAINE HYDROCHLORIDE 10 MG/ML
5 INJECTION, SOLUTION INFILTRATION; PERINEURAL ONCE
Status: COMPLETED | OUTPATIENT
Start: 2018-10-16 | End: 2018-10-16

## 2018-10-16 RX ORDER — OXYCODONE HYDROCHLORIDE AND ACETAMINOPHEN 5; 325 MG/1; MG/1
1 TABLET ORAL ONCE
Status: COMPLETED | OUTPATIENT
Start: 2018-10-16 | End: 2018-10-16

## 2018-10-16 RX ORDER — OXYCODONE AND ACETAMINOPHEN 10; 325 MG/1; MG/1
1 TABLET ORAL EVERY 6 HOURS PRN
Qty: 12 TABLET | Refills: 0 | Status: SHIPPED | OUTPATIENT
Start: 2018-10-16 | End: 2018-10-19

## 2018-10-16 RX ADMIN — OXYCODONE HYDROCHLORIDE AND ACETAMINOPHEN 1 TABLET: 5; 325 TABLET ORAL at 23:23

## 2018-10-16 RX ADMIN — LIDOCAINE HYDROCHLORIDE 5 ML: 10 INJECTION, SOLUTION INFILTRATION; PERINEURAL at 22:07

## 2018-10-16 ASSESSMENT — PAIN DESCRIPTION - LOCATION: LOCATION: LEG

## 2018-10-16 ASSESSMENT — PAIN DESCRIPTION - PAIN TYPE: TYPE: CHRONIC PAIN;ACUTE PAIN

## 2018-10-16 ASSESSMENT — PAIN SCALES - GENERAL
PAINLEVEL_OUTOF10: 10
PAINLEVEL_OUTOF10: 7

## 2018-10-17 NOTE — ED PROVIDER NOTES
[address] [place] [address] Court  eMERGENCY dEPARTMENT eNCOUnter      Pt Name: Pastor Pop  MRN: [id number]  YOB: 1965  Date ofevaluation: 27/20/0526  Provider: Rosales Jay MD    [address] [place] [address]       Chief Complaint   Patient presents with    Laceration     pt states he fell on the floor and \"busted his leg open\".  Hyperglycemia     blood sugar low at 63. HISTORY OF PRESENT ILLNESS  (Location/Symptom, Timing/Onset, Context/Setting, Quality, Duration, Modifying Factors, Severity.)   Pastor Pop is a 52 y.o. male who presents to the emergency department with a laceration to his right lower extremity stump. Patient states that he was getting up from his wheelchair when he lost his balance, fell, and struck his knee on a tile floor. Injury occurred approximately 20 minutes prior to arrival. No other injuries. Patient is here because he thinks he may need stitches. Nursing notes were reviewed. REVIEW OF SYSTEMS    (2-9systems for level 4, 10 or more for level 5)   ROS:  General:  No fevers, no chills, no weakness  HEENT: No sore throat, runny nose or ear pain  Cardiovascular:  No chest pain, no palpitations  Respiratory:  No shortnessof breath, no cough, no wheezing  Gastrointestinal:  No pain, no nausea, no vomiting, no diarrhea  Musculoskeletal:  No muscle pain, no joint pain  Skin:  No rash, no easy bruising  Genitourinary:  No dysuria, nohematuria    Except as noted above theremainder of the review of systems was reviewed and negative.        PASTMEDICAL HISTORY     Past Medical History:   Diagnosis Date    Cancer [facility])     breast left    Cancer ([facility] [address]     stomach    Gastroparesis 2013    GERD (gastroesophageal reflux disease)     Hypertension     Neuropathy     Type II or unspecified type diabetes mellitus without mention of complication, not stated as uncontrolled          SURGICAL HISTORY       Past Surgical History:   Procedure Laterality Date    BREAST SURGERY they will return to the ED for reevaluation. CONSULTS:  None    PROCEDURES:  Procedures     Procedure Note - Laceration repair:  Questions were sought and answered and verbal consent was given by patient for the procedure. The area was prepped and draped in standard bedside fashion. The wound area was anesthetized with 2ml of Lidocaine 1% without epinephrine without added sodium bicarbonate. The wound was explored with No foreign bodies found. The wound was repaired with 4-0 Ethilon; 4 interuppted sutures were used. The patient tolerated the procedure well without complications and my repeat neurovascular exam post-procedure is unchanged. Wound care and scar minimization education was provided. Instructions were given to return for increasing pain, redness, streaking, discharge, or any other worsening or worrisome concerns. CRITICAL CARE TIME    Total Critical Care time was 0 minutes, excluding separately reportable procedures. There was a high probability of clinically significant/life threateningdeterioration in the patient's condition which required my urgent intervention. FINAL IMPRESSION      1. Laceration of right lower extremity, initial encounter          DISPOSITION/PLAN   DISPOSITION Decision To Discharge 10/16/2018 11:16:21 PM      PATIENT REFERRED TO:  OXANA Arteaga - [facility]  [address] [place] [address]  [phone]    Schedule an appointment as soon as possible for a visit in 1 week  For suture removal      DISCHARGE MEDICATIONS:  New Prescriptions    OXYCODONE-ACETAMINOPHEN (PERCOCET)  MG PER TABLET    Take 1 tablet by mouth every 6 hours as needed for Pain for up to 3 days. .       Comment: Please note this report has been produced using speech recognition software and may contain errors related tothat system including errors in grammar, punctuation, and spelling, as well as words and phrases that may be inappropriate.  If there are any questions or concerns please feel

## 2018-10-31 DIAGNOSIS — IMO0002 UNCONTROLLED TYPE 1 DIABETES MELLITUS WITH COMPLICATION: ICD-10-CM

## 2018-11-06 ENCOUNTER — TELEPHONE (OUTPATIENT)
Dept: INTERNAL MEDICINE | Facility: CLINIC | Age: 53
End: 2018-11-06

## 2018-11-06 RX ORDER — INSULIN ASPART 100 [IU]/ML
INJECTION, SOLUTION INTRAVENOUS; SUBCUTANEOUS
Qty: 10 PEN | Refills: 0 | Status: SHIPPED | OUTPATIENT
Start: 2018-11-06 | End: 2018-12-27 | Stop reason: SDUPTHER

## 2018-11-06 RX ORDER — SYRINGE-NEEDLE,INSULIN,0.5 ML 31 GX5/16"
SYRINGE, EMPTY DISPOSABLE MISCELLANEOUS
Qty: 180 EACH | Refills: 0 | Status: SHIPPED | OUTPATIENT
Start: 2018-11-06 | End: 2018-12-31

## 2018-11-06 RX ORDER — HUMAN INSULIN 100 [IU]/ML
INJECTION, SUSPENSION SUBCUTANEOUS
Qty: 40 ML | Refills: 0 | Status: SHIPPED | OUTPATIENT
Start: 2018-11-06 | End: 2018-12-27 | Stop reason: SDUPTHER

## 2018-11-06 NOTE — TELEPHONE ENCOUNTER
Spoke with patient and he stated that he thought he was scheduled to come in December. Informed patient that he had appt on yesterday and he missed it. Pt states that he will try to come in but he has to come in December . Pt was transferred to schedule appt.

## 2018-11-06 NOTE — TELEPHONE ENCOUNTER
William,     Please inform pt that he has not been seen since 2016. He has missed several appointments including his appointment yesterday. Although it is our clinic policy to discharge a patient from clinic after 3 no shows or cancellations, I did make an exception to allow him to try to follow up one more time.     I have refilled his insulins only for a few months with no refills. He cannot have any further refills from me unless he is seen by me or Vedrana. If he continues to miss future appointments he will be discharged from the clinic.    Thank you,  Shana Monahan

## 2018-12-27 DIAGNOSIS — IMO0002 UNCONTROLLED TYPE 1 DIABETES MELLITUS WITH COMPLICATION: ICD-10-CM

## 2018-12-27 RX ORDER — PEN NEEDLE, DIABETIC 29 G X1/2"
NEEDLE, DISPOSABLE MISCELLANEOUS
Refills: 0 | OUTPATIENT
Start: 2018-12-27

## 2018-12-27 RX ORDER — PEN NEEDLE, DIABETIC 29 G X1/2"
NEEDLE, DISPOSABLE MISCELLANEOUS
Refills: 3 | OUTPATIENT
Start: 2018-12-27

## 2018-12-27 RX ORDER — SYRING-NEEDL,DISP,INSUL,0.3 ML 28GX1/2"
SYRINGE, EMPTY DISPOSABLE MISCELLANEOUS
Refills: 3 | OUTPATIENT
Start: 2018-12-27

## 2018-12-27 RX ORDER — INSULIN ASPART 100 [IU]/ML
INJECTION, SOLUTION INTRAVENOUS; SUBCUTANEOUS
Qty: 10 PEN | Refills: 0 | Status: SHIPPED | OUTPATIENT
Start: 2018-12-27 | End: 2019-04-09 | Stop reason: SDUPTHER

## 2018-12-27 RX ORDER — PEN NEEDLE, DIABETIC 29 G X1/2"
1 NEEDLE, DISPOSABLE MISCELLANEOUS 3 TIMES DAILY
Qty: 300 EACH | Refills: 1 | Status: SHIPPED | OUTPATIENT
Start: 2018-12-27 | End: 2018-12-31 | Stop reason: DRUGHIGH

## 2018-12-27 NOTE — TELEPHONE ENCOUNTER
PATIENT HAD A STROKE A FEW WEEKS ON HIS LEFT SIDE AND HAD TO HAVE HIS RIGHT LEG REMOVED DUE TO ARTERY ARE COLLAPSING AND HAD HEART CATH AND STINTS PLACED IN LEFT LEG TO HELP CIRCULATION. HIS SUGARS ARE RUNNING UP AND DOWN. HE WANTED TO INFORM DR. REEDER ABOUT WHATS GOING ON WITH HIM. HE NEEDS REFILLS ON NEEDLES AND HUMALOG QUICK PENS. HE IS SCHEDULED TO SEE DR. REEDER IN MARCH FOR FOLLOW UP APPOINTMENT. PATIENTS SURGEON IS DR. JOYCE YEUNG AT [facility] PHONE NUMBER [phone]

## 2018-12-31 DIAGNOSIS — IMO0002 UNCONTROLLED TYPE 1 DIABETES MELLITUS WITH COMPLICATION: ICD-10-CM

## 2018-12-31 RX ORDER — PEN NEEDLE, DIABETIC 30 GX3/16"
1 NEEDLE, DISPOSABLE MISCELLANEOUS
Qty: 300 EACH | Refills: 1 | Status: SHIPPED | OUTPATIENT
Start: 2018-12-31 | End: 2019-04-09 | Stop reason: SDUPTHER

## 2018-12-31 NOTE — TELEPHONE ENCOUNTER
PATIENT IS CALLING TO FIND OUT WHY HIS MEDICATIONS WERE REFUSED. HE SAID HE WAS TOLD TO CALL OFFICE. PATIENT CAN BE REACHED [phone] PATIENT HAS APPT SCHEDULED IN MARCH

## 2019-03-18 ENCOUNTER — TELEPHONE (OUTPATIENT)
Dept: INTERNAL MEDICINE | Facility: CLINIC | Age: 54
End: 2019-03-18

## 2019-03-18 NOTE — TELEPHONE ENCOUNTER
CALLING FOR RX'S ON TWO DIFFERENT PEN NEEDLES.    BD ULTRA FINE MINI PEN NEEDLES  AND   BD ULTRA FINE REGULAR PEN NEEDLES

## 2019-03-29 ENCOUNTER — HOSPITAL ENCOUNTER (EMERGENCY)
Facility: HOSPITAL | Age: 54
Discharge: LEFT BEFORE DISCHARGE | End: 2019-03-29
Attending: HOSPITALIST
Payer: MEDICARE

## 2019-03-29 DIAGNOSIS — E16.2 HYPOGLYCEMIA: Primary | ICD-10-CM

## 2019-03-29 PROCEDURE — 99284 EMERGENCY DEPT VISIT MOD MDM: CPT

## 2019-03-29 PROCEDURE — 2500000003 HC RX 250 WO HCPCS

## 2019-03-29 RX ADMIN — GLUCAGON HYDROCHLORIDE: KIT at 08:10

## 2019-03-29 NOTE — ED PROVIDER NOTES
[facility] ENCOUNTER      Pt Name: Adrianna Perez  MRN: [id number]  YOB: 1965  Date of evaluation: 3/29/2019  Provider: Eusebio June DO    CHIEF COMPLAINT     No chief complaint on file. HISTORY OF PRESENT ILLNESS  (Location/Symptom, Timing/Onset, Context/Setting, Quality, Duration, Modifying Factors, Severity.)   Adrianna Perez is a 52 y.o. male who presents to the emergency department for  hypoglycemia and altered mental status. The patient was altered at home and apparently was smacking at people. Upon arrival of EMS  His fingerstick was 83. Patient had multiple IV attempts performed but was able to have one established with a EMS gave the patient glucagon 1 mg IM. oon arrival here the patient  Is alert awake and oriented ×4 is answering questions appropriateely with a fingerstick glucose from EMS of  189. Patient states that his blood sugar had been running high yesterday and he gave himself some extra insulin and even ate yesterday but believes he may have over done it with the insulin dosing that Caused him to have a low blood sugar this morning. Upon arrival here before the patient ever got off the stretcher he states that he feels fine and would like to go back home. Patient declined any type of physical exam.  She states he apologized that he had to come in but does not want to be evaluated. No review of systems was obtained secondary to the patient declining any type of medical evaluation and wants to sign AMA papers at this time. Nursing notes were reviewed. REVIEW OFSYSTEMS    (2-9 systems for level 4, 10 or more for level 5)   ROS:     Not obtained patient the warning sign out AMA before he ever cut off the stretcher from EMS. Except as noted above the remainder of the review of systems was reviewed and negative.        PAST MEDICAL HISTORY     Past Medical History:   Diagnosis Date    Cancer [facility])     breast left    file     Inability: Not on file    Transportation needs:     Medical: Not on file     Non-medical: Not on file   Tobacco Use    Smoking status: Current Every Day Smoker     Packs/day: 0.50     Years: 30.00     Pack years: 15.00     Types: Cigarettes    Smokeless tobacco: Never Used   Substance and Sexual Activity    Alcohol use: No    Drug use: No    Sexual activity: Not on file   Lifestyle    Physical activity:     Days per week: Not on file     Minutes per session: Not on file    Stress: Not on file   Relationships    Social connections:     Talks on phone: Not on file     Gets together: Not on file     Attends Spiritism service: Not on file     Active member of club or organization: Not on file     Attends meetings of clubs or organizations: Not on file     Relationship status: Not on file    Intimate partner violence:     Fear of current or ex partner: Not on file     Emotionally abused: Not on file     Physically abused: Not on file     Forced sexual activity: Not on file   Other Topics Concern    Not on file   Social History Narrative    Not on file         PHYSICAL EXAM    (up to 7 for level 4, 8 or more for level 5)     ED Triage Vitals   BP Temp Temp src Pulse Resp SpO2 Height Weight   -- -- -- -- -- -- -- --       Physical Exam  General :Patient is awake, alert, oriented, in no acute distress, nontoxic appearing  HEENT: Pupils are equally round and reactive to light, EOMI, conjunctivae clear. Oral mucosa is moist, no exudate. Uvula is midline   Neuro: Motor intact, sensory intact, level of consciousness is normal  Psych: Mentation is grossly normal, cognition is grossly normal. Affect is appropriate.       DIAGNOSTIC RESULTS     EKG: All EKG's are interpreted by the Emergency Department Physician who either signs or Co-signs this chart in the [address] a cardiologist.        RADIOLOGY:   Non-plain film images such as CT, Ultrasound and MRI are read by the radiologist. Plain radiographic images are visualized and preliminarily interpreted by the emergency physician with the below findings:      ? Radiologist's Report Reviewed:  No orders to display         ED BEDSIDE ULTRASOUND:   Performed by ED Physician - none    LABS:    I have reviewed and interpreted all of the currently available lab results from this visit (ifapplicable):  No results found for this visit on 03/29/19. All other labs were within normal range or not returned as of this dictation. EMERGENCY DEPARTMENT COURSE and DIFFERENTIAL DIAGNOSIS/MDM:   Vitals: There were no vitals filed for this visit. MEDICATIONS ADMINISTERED IN ED:  Medications   glucagon (rDNA) 1 MG injection (  Given by Other 3/29/19 0810)       Upon presentation via EMS the patient was on the stretcher states that he wants to go home he does not wear any further evaluation workup performed. Patient states that he feels fine advised that his blood sugar had been running high yesterday and he gave himself some extra insulin to compensate and believes he may have given a little too much. Patient states he did eat a yesterday evening as normal.  Again upon presentation the patient advised that he wants to sign out against medical device does not require any further evaluation workup. He was moved from the EMS stretcher to the bed and paperwork was signed. He made a phone call to have a ride come pick him up. He is awaiting transportation to home by family. The patient will follow-up with their PCP in 1-2 days for reevaluation. Patient advised if symptoms worsens or new symptoms arise he should return back to emergency department for further evaluation workup. CONSULTS:  None    PROCEDURES:  Procedures    CRITICAL CARE TIME    Total Critical Care time was 0 minutes, excluding separately reportable procedures.    There was a high probability of clinically significant/life threatening deterioration in the patient's condition which required my urgent

## 2019-03-29 NOTE — ED NOTES
Discharge Planning Assessment  [facility]     Patient Name: Blake Chavez  MRN: [id number]  Today's Date: 4/7/2017    Admit Date: 4/5/2017          Discharge Needs Assessment       04/07/17 1553    Living Environment    Lives With spouse;child(luis), adult    Living Arrangements house    Provides Primary Care For no one    Primary Care Provided By spouse/significant other    Quality Of Family Relationships supportive    Able to Return to Prior Living Arrangements yes    Discharge Needs Assessment    Concerns To Be Addressed adjustment to diagnosis/illness concerns    Anticipated Changes Related to Illness none    Equipment Currently Used at Home cane, quad;shower chair;other (see comments)   Rollator    Discharge Facility/Level Of Care Needs home with home health    Transportation Available family or friend will provide    Current Discharge Risk chronically ill    Discharge Disposition home healthcare service            Discharge Plan       04/07/17 1948    Case Management/Social Work Plan    Plan home with home health    Patient/Family In Agreement With Plan yes    Additional Comments LSW assesment complete. Pt resides at home with his spouse and son. Pt appears to have good family support. pt's goal is to return home at d/c. pt will benefit from home health. LSW awaiting additional recomendations from MD and therapy. LSW/case mgt will follow up as consulted and complete arrangements as ordered.        Discharge Placement     No information found        Expected Discharge Date and Time     Expected Discharge Date Expected Discharge Time    Apr 8, 2017               Demographic Summary       04/07/17 1425    Referral Information    Admission Type inpatient    Referral Source physician    Reason For Consult discharge planning    Record Reviewed medical record    Primary Care Physician Information    Name Dr Tovar            Functional Status       04/07/17 1551    Functional Status Prior    Ambulation  Pt arrived via [facility], A/Ox4. States \"im good now, I ready to go home\". Pt states that he really doesn't want to be seen and evaluated by ER. Pt states \"I know what happened, im good now\". 8146- Pt signed out AMA, Dr. Alycia Moreno at bedside.       Karley Haider RN  03/29/19 3071 1-->assistive equipment    Transferring 0-->independent    Toileting 0-->independent    Bathing 1-->assistive equipment    Dressing 0-->independent    Eating 0-->independent    Communication 0-->understands/communicates without difficulty    Swallowing 0-->swallows foods/liquids without difficulty    IADL    Medications independent    Meal Preparation completely dependent    Housekeeping completely dependent    Laundry completely dependent    Shopping completely dependent    Oral Care completely dependent    Activity Tolerance    Current Activity Limitations none    Usual Activity Tolerance moderate    Current Activity Tolerance poor    Cognitive/Perceptual/Developmental    Current Mental Status/Cognitive Functioning no deficits noted    Recent Changes in Mental Status/Cognitive Functioning unable to assess    Developmental Stage (Eriksson's Stages of Development) Stage 7 (35-65 years/Middle Adulthood) Generativity vs. Stagnation            Psychosocial     None            Abuse/Neglect     None            Legal     None            Substance Abuse     None            Patient Forms     None          DARIAN Cheney

## 2019-03-30 ENCOUNTER — HOSPITAL ENCOUNTER (EMERGENCY)
Facility: HOSPITAL | Age: 54
Discharge: HOME | End: 2019-03-30
Attending: EMERGENCY MEDICINE
Payer: MEDICARE

## 2019-03-30 VITALS
DIASTOLIC BLOOD PRESSURE: 71 MMHG | RESPIRATION RATE: 17 BRPM | SYSTOLIC BLOOD PRESSURE: 129 MMHG | OXYGEN SATURATION: 97 % | TEMPERATURE: 97.6 F | HEART RATE: 113 BPM

## 2019-03-30 DIAGNOSIS — E16.2 HYPOGLYCEMIA: Primary | ICD-10-CM

## 2019-03-30 LAB
GLUCOSE BLD-MCNC: 62 MG/DL (ref 74–106)
GLUCOSE BLD-MCNC: 83 MG/DL (ref 74–106)
PERFORMED ON: ABNORMAL
PERFORMED ON: NORMAL

## 2019-03-30 PROCEDURE — 99283 EMERGENCY DEPT VISIT LOW MDM: CPT

## 2019-03-30 RX ORDER — OXYCODONE AND ACETAMINOPHEN 10; 325 MG/1; MG/1
1 TABLET ORAL EVERY 4 HOURS PRN
COMMUNITY
End: 2019-07-11

## 2019-03-30 ASSESSMENT — ENCOUNTER SYMPTOMS
COUGH: 0
EYE DISCHARGE: 0
TROUBLE SWALLOWING: 0
SORE THROAT: 0
BACK PAIN: 0
NAUSEA: 0
EYE PAIN: 0
SHORTNESS OF BREATH: 0
ABDOMINAL PAIN: 0
WHEEZING: 0
CHEST TIGHTNESS: 0
DIARRHEA: 0
RHINORRHEA: 0
VOMITING: 0
SINUS PRESSURE: 0
EYE REDNESS: 0
CONSTIPATION: 0

## 2019-03-30 NOTE — ED PROVIDER NOTES
[address] [place] [address] Court  eMERGENCY dEPARTMENT eNCOUnter      Pt Name: Martha Greene  MRN: [id number]  [place] 1965  Date of evaluation: 3/30/2019  Provider: Lamberto Lacey MD    [address] [place] [address]       Chief Complaint   Patient presents with    Hypoglycemia         HISTORY OF PRESENT ILLNESS   (Location/Symptom, Timing/Onset, Context/Setting, Quality, Duration, Modifying Factors, Severity)  Note limiting factors. Martha Greene is a 52 y.o. male who presents to the emergency department because of low blood sugar this am PTA. EMS given patient glucagon. Patient says he thinks insulin dose too much. He says he feels fine now. No complaints. Nursing Notes were reviewed. REVIEW OF SYSTEMS    (2-9 systems for level 4, 10 or more forlevel 5)     Review of Systems   Constitutional: Negative for chills and fever. HENT: Negative for congestion, ear pain, postnasal drip, rhinorrhea, sinus pressure, sneezing, sore throat and trouble swallowing. Eyes: Negative for pain, discharge and redness. Respiratory: Negative for cough, chest tightness, shortness of breath and wheezing. Cardiovascular: Negative for chest pain, palpitations and leg swelling. Gastrointestinal: Negative for abdominal pain, constipation, diarrhea, nausea and vomiting. Genitourinary: Negative for dysuria, flank pain, frequency, hematuria and urgency. Musculoskeletal: Negative for back pain, joint swelling and neck pain. Skin: Negative for pallor and rash. Neurological: Negative for dizziness, syncope, weakness, numbness and headaches. Altered mental status   Psychiatric/Behavioral: Negative for confusion and hallucinations. The patient is not nervous/anxious.             PAST MEDICAL HISTORY     Past Medical History:   Diagnosis Date    Cancer [facility])     breast left    Cancer ([facility] [address]     stomach    Gastroparesis 2013    GERD (gastroesophageal reflux disease)     Hypertension     Neuropathy     Type II or unspecified type diabetes mellitus without mention of complication, not stated as uncontrolled          SURGICAL HISTORY       Past Surgical History:   Procedure Laterality Date    BREAST SURGERY      CHOLECYSTECTOMY      FEMORAL-FEMORAL BYPASS GRAFT      FINGER TRIGGER RELEASE      GASTROSTOMY TUBE PLACEMENT  2013    HERNIA REPAIR      x2    LEG AMPUTATION BELOW KNEE Right     SMALL INTESTINE SURGERY           CURRENT MEDICATIONS       Previous Medications    B-D INS SYR ULTRAFINE .5CC/30G 30G X 1/2\" 0.5 ML MISC        EASY TOUCH INSULIN SYRINGE 31G X 5/16\" 0.5 ML MISC    USE TO INJECT INSULIN 3 TIMES A DAY AS DIRECTED    GABAPENTIN (NEURONTIN) 800 MG TABLET    Take 800 mg by mouth 4 times daily     INSULIN ASPART (NOVOLOG SC)    Inject into the skin 4 times daily Sliding scale    INSULIN NPH HUMAN, ISOPHANE, (NOVOLIN N SC)    Inject 14 Units into the skin 2 times daily     INSULIN SYRINGES, DISPOSABLE, U-100 0.5 ML MISC    Tid  Dx: 250.00    OXYCODONE-ACETAMINOPHEN (PERCOCET)  MG PER TABLET    Take 1 tablet by mouth every 4 hours as needed for Pain. ZOLPIDEM (AMBIEN) 10 MG TABLET    Take by mouth nightly as needed for Sleep.        ALLERGIES     Phenergan [promethazine hcl]    FAMILY HISTORY       Family History   Problem Relation Age of Onset    Diabetes Mother     Cancer Father         leukemia          SOCIAL HISTORY       Social History     Socioeconomic History    Marital status:      Spouse name: Not on file    Number of children: Not on file    Years of education: Not on file    Highest education level: Not on file   Occupational History    Not on file   Social Needs    Financial resource strain: Not on file    Food insecurity:     Worry: Not on file     Inability: Not on file    Transportation needs:     Medical: Not on file     Non-medical: Not on file   Tobacco Use    Smoking status: Current Every Day Smoker     Packs/day: 0.50     Years: 30.00     Pack years: 15.00 Types: Cigarettes    Smokeless tobacco: Never Used   Substance and Sexual Activity    Alcohol use: No    Drug use: No    Sexual activity: Not on file   Lifestyle    Physical activity:     Days per week: Not on file     Minutes per session: Not on file    Stress: Not on file   Relationships    Social connections:     Talks on phone: Not on file     Gets together: Not on file     Attends Yarsani service: Not on file     Active member of club or organization: Not on file     Attends meetings of clubs or organizations: Not on file     Relationship status: Not on file    Intimate partner violence:     Fear of current or ex partner: Not on file     Emotionally abused: Not on file     Physically abused: Not on file     Forced sexual activity: Not on file   Other Topics Concern    Not on file   Social History Narrative    Not on file       SCREENINGS             PHYSICAL EXAM    (up to 7 for level 4, 8 or more for level 5)     ED Triage Vitals [03/30/19 0938]   BP Temp Temp Source Pulse Resp SpO2 Height Weight   (!) 132/91 97.6 °F (36.4 °C) Oral 109 17 97 % -- --       Physical Exam   Constitutional: He is oriented to person, place, and time. He appears well-developed and well-nourished. HENT:   Head: Normocephalic and atraumatic. Mouth/Throat: Oropharynx is clear and moist.   Eyes: Pupils are equal, round, and reactive to light. Conjunctivae and EOM are normal.   Neck: Neck supple. Cardiovascular: Normal rate and regular rhythm. Pulmonary/Chest: Effort normal and breath sounds normal.   Abdominal: Soft. Bowel sounds are normal.   Musculoskeletal: Normal range of motion. Neurological: He is alert and oriented to person, place, and time. Skin: Skin is warm and dry. Psychiatric: He has a normal mood and affect.        DIAGNOSTIC RESULTS     EKG: All EKG's are interpreted by the Emergency Department Physician who either signs or Co-signs this chart in the absence of a cardiologist.        RADIOLOGY:   Non-plain film images such as CT, Ultrasound and MRI are read by the radiologist. Plain radiographic images are visualized andpreliminarily interpreted by the emergency physician with the below findings:        Interpretationper the Radiologist below, if available at the time of this note:    No orders to display         ED BEDSIDE ULTRASOUND:   Performed by ED Physician - none    LABS:  Labs Reviewed   POCT GLUCOSE - Abnormal; Notable for the following components:       Result Value    POC Glucose 62 (*)     All other components within normal limits    Narrative:     Performed at:  [address] [place] [address] Laboratory  [facility],  [place], [place]   Phone [phone]       All other labs were within normal range or not returned as of this dictation. EMERGENCY DEPARTMENT COURSE and DIFFERENTIAL DIAGNOSIS/MDM:   Vitals:    Vitals:    03/30/19 0938   BP: (!) 132/91   Pulse: 109   Resp: 17   Temp: 97.6 °F (36.4 °C)   TempSrc: Oral   SpO2: 97%           CRITICAL CARE TIME   Total Critical Care time was  minutes, excluding separatelyreportable procedures. There was a high probability ofclinically significant/life threatening deterioration in the patient's condition which required my urgent intervention. CONSULTS:  None    PROCEDURES:  None    PROGRESS NOTES:    Follow up with PCP Monday to adjust insulin dosing. FINAL IMPRESSION      1.  Hypoglycemia          Final diagnoses:   Hypoglycemia       DISPOSITION/PLAN   DISPOSITION Decision To Discharge 03/30/2019 09:41:12 AM      PATIENT REFERRED TO:  OXANA Clay CNP  [address]  [phone]    In 2 days  for recheck      DISCHARGE MEDICATIONS:  New Prescriptions    No medications on file         (Please note thatportions of this note may have been completed with a voice recognition program.  Efforts were [facility] edit the dictations but occasionally words are mis-transcribed.)    Kate Livingston MD (electronically signed)  Attending Emergency Physician        Ligia Cobos MD  03/30/19 2014

## 2019-03-30 NOTE — ED NOTES
Discharge instructions and follow up care reviewed with patient. Patient verbalized understanding of all discharge instructions. Patient's ex-wife and caregiver was called to come and pick patient up for [facility]. Patient left [facility] via personal vehicle with ex-wife.       Zoila Lerner RN  03/30/19 1010

## 2019-03-30 NOTE — ED TRIAGE NOTES
Patient was just seen at [facility] for an episode of hypoglycemia. Patient was found unconscious by his grandchildren this AM.  Family called [facility]. Upon arrival EMS performed a POC glucose check; patient's glocose was 32. EMS reports patient was alert to painful stimuli only. EMS administered 1 dose of glucagon. EMS attempted to insert an IV but could not establish a successful line. Patient was brought to [facility] for further assessment.

## 2019-04-01 ENCOUNTER — HOSPITAL ENCOUNTER (EMERGENCY)
Facility: HOSPITAL | Age: 54
Discharge: HOME | End: 2019-04-01
Attending: EMERGENCY MEDICINE
Payer: MEDICARE

## 2019-04-01 VITALS
RESPIRATION RATE: 16 BRPM | SYSTOLIC BLOOD PRESSURE: 140 MMHG | BODY MASS INDEX: 19.26 KG/M2 | HEART RATE: 106 BPM | TEMPERATURE: 97 F | OXYGEN SATURATION: 98 % | WEIGHT: 130 LBS | HEIGHT: 69 IN | DIASTOLIC BLOOD PRESSURE: 84 MMHG

## 2019-04-01 DIAGNOSIS — E16.2 HYPOGLYCEMIA: Primary | ICD-10-CM

## 2019-04-01 LAB
GLUCOSE BLD-MCNC: 83 MG/DL (ref 74–106)
PERFORMED ON: NORMAL

## 2019-04-01 PROCEDURE — 99284 EMERGENCY DEPT VISIT MOD MDM: CPT

## 2019-04-01 ASSESSMENT — PAIN SCALES - GENERAL: PAINLEVEL_OUTOF10: 6

## 2019-04-01 NOTE — PROGRESS NOTES
Reviewed discharge instructions and follow up care with patient. Patient verbalized understanding. No complaints or concerns from patient at this time.

## 2019-04-01 NOTE — ED PROVIDER NOTES
[address]  eMERGENCY dEPARTMENT eNCOUnter      Pt Name: Nestor Ramirez  MRN: [id number]  YOB: 1965  Date ofevaluation: 7/5/0900  Provider: Umesh Olivo MD    CHIEF COMPLAINT       Chief Complaint   Patient presents with    Hypoglycemia         HISTORY OF PRESENT ILLNESS  (Location/Symptom, Timing/Onset, Context/Setting, Quality, Duration, Modifying Factors, Severity.)   Nestor Ramirez is a 52 y.o. male who presents to the emergency department after an episode of hypoglycemia tonight. He's had low blood sugar for the last few nights. He has not changed his eating but does adjust his insulin according to his glucose readings. He has an appointment with his endocrinologist in 3 days. He says he is fine now and doesn't want us to draw his blood, put in an IV, etc.  He says that his ex-wife made him come to the hospital.      Nursing notes were reviewed. REVIEW OF SYSTEMS    (2-9systems for level 4, 10 or more for level 5)   ROS:  General:  No fevers, no chills, no weakness  HEENT: No sore throat, runny nose or ear pain  Cardiovascular:  No chest pain, no palpitations  Respiratory:  No shortness of breath, no cough, no wheezing  Gastrointestinal:  No pain, no nausea, no vomiting, no diarrhea  Musculoskeletal:  No muscle pain, no joint pain  Skin:  No rash, no easy bruising  Genitourinary:  No dysuria, no hematuria  Low blood sugar-now resolved. Except as noted above theremainder of the review of systems was reviewed and negative.        PASTMEDICAL HISTORY     Past Medical History:   Diagnosis Date    Cancer [facility])     breast left    Cancer ([facility] [address]     stomach    Cerebral artery occlusion with cerebral infarction ([facility] [address] [facility]     Gastroparesis 2013    GERD (gastroesophageal reflux disease)     Hypertension     Neuropathy     Prostate disease     Type II or unspecified type diabetes mellitus without mention of complication, not stated as uncontrolled SURGICAL HISTORY       Past Surgical History:   Procedure Laterality Date    BREAST SURGERY      CHOLECYSTECTOMY      FEMORAL-FEMORAL BYPASS GRAFT      FINGER TRIGGER RELEASE      GASTROSTOMY TUBE PLACEMENT  2013    HERNIA REPAIR      x2    LEG AMPUTATION BELOW KNEE Right     SMALL INTESTINE SURGERY           CURRENT MEDICATIONS       Previous Medications    B-D INS SYR ULTRAFINE .5CC/30G 30G X 1/2\" 0.5 ML MISC        EASY TOUCH INSULIN SYRINGE 31G X 5/16\" 0.5 ML MISC    USE TO INJECT INSULIN 3 TIMES A DAY AS DIRECTED    GABAPENTIN (NEURONTIN) 800 MG TABLET    Take 800 mg by mouth 4 times daily     INSULIN ASPART (NOVOLOG SC)    Inject into the skin 4 times daily Sliding scale    INSULIN NPH HUMAN, ISOPHANE, (NOVOLIN N SC)    Inject 14 Units into the skin 2 times daily     INSULIN SYRINGES, DISPOSABLE, U-100 0.5 ML MISC    Tid  Dx: 250.00    OXYCODONE-ACETAMINOPHEN (PERCOCET)  MG PER TABLET    Take 1 tablet by mouth every 4 hours as needed for Pain. ZOLPIDEM (AMBIEN) 10 MG TABLET    Take by mouth nightly as needed for Sleep.        ALLERGIES     Phenergan [promethazine hcl]    FAMILY HISTORY       Family History   Problem Relation Age of Onset    Diabetes Mother     Cancer Father         leukemia          SOCIAL HISTORY       Social History     Socioeconomic History    Marital status:      Spouse name: None    Number of children: None    Years of education: None    Highest education level: None   Occupational History    None   Social Needs    Financial resource strain: None    Food insecurity:     Worry: None     Inability: None    Transportation needs:     Medical: None     Non-medical: None   Tobacco Use    Smoking status: Current Every Day Smoker     Packs/day: 0.50     Years: 30.00     Pack years: 15.00     Types: Cigarettes    Smokeless tobacco: Never Used   Substance and Sexual Activity    Alcohol use: No    Drug use: No    Sexual activity: Not Currently Lifestyle    Physical activity:     Days per week: None     Minutes per session: None    Stress: None   Relationships    Social connections:     Talks on phone: None     Gets together: None     Attends [employer] service: None     Active member of club or organization: None     Attends meetings of clubs or organizations: None     Relationship status: None    Intimate partner violence:     Fear of current or ex partner: None     Emotionally abused: None     Physically abused: None     Forced sexual activity: None   Other Topics Concern    None   Social History Narrative    None         PHYSICAL EXAM    (up to 7 forlevel 4, 8 or more for level 5)     ED Triage Vitals   BP Temp Temp Source Pulse Resp SpO2 Height Weight   04/01/19 0046 04/01/19 0046 04/01/19 0046 04/01/19 0046 04/01/19 0049 04/01/19 0049 04/01/19 0046 04/01/19 0046   (!) 140/84 97 °F (36.1 °C) Oral 106 16 98 % 5' 9\" (1.753 m) 130 lb (59 kg)       Physical Exam  General :Patient is awake, alert, oriented, in no acute distress, nontoxic appearing  HEENT: Pupils are equally round and reactive to light, EOMI. Cardiac: Heart regular rate, rhythm, no murmurs, rubs, or gallops  Lungs: Lungs are clear to auscultation, there is no wheezing, rhonchi, or rales. Abdomen:Abdomen is soft, nontender, nondistended. Musculoskeletal: Right BKA  Back: No midline or bony tenderness. Dermatology: Skin is warm and dry  Psych: Mentation is grossly normal, cognition is grossly normal. Affect is appropriate.       DIAGNOSTIC RESULTS       RADIOLOGY:   Non-plain film images such as CT, Ultrasoundand MRI are read by the radiologist. Plain radiographic images are visualized and preliminarily interpreted by the emergency physician with the below findings:      [] Radiologist's Report Reviewed:  No orders to display         ED BEDSIDE ULTRASOUND:   Performed by ED Physician - none    LABS:  Labs Reviewed   POCT GLUCOSE    Narrative:     Performed at:  [facility]) - [facility]  [address],  [place], [place] [address]   Phone [phone]       I have reviewed and interpreted all of the currently available lab resultsfrom this visit (if applicable):  Results for orders placed or performed during the hospital encounter of 04/01/19   POCT Glucose   Result Value Ref Range    POC Glucose 83 74 - 106 mg/dl    Performed on ACCU-CHEK         All other labs were within normal range or not returned as of this dictation. EMERGENCY DEPARTMENT COURSE and DIFFERENTIAL DIAGNOSIS/MDM:   Vitals:    Vitals:    04/01/19 0046 04/01/19 0049   BP: (!) 140/84    Pulse: 106    Resp:  16   Temp: 97 °F (36.1 °C)    TempSrc: Oral    SpO2:  98%   Weight: 130 lb (59 kg)    Height: 5' 9\" (1.753 m)          The patient will follow-up with their PCP in 1-2 days for reevaluation. If the patient or family members have any further concerns or any worsening symptoms they will return to the ED for reevaluation. CONSULTS:  None    PROCEDURES:  Procedures    CRITICAL CARE TIME    Total Critical Care time was 0 minutes, excluding separately reportable procedures. There was a high probability of clinically significant/life threatening deterioration in the patient's condition which required my urgent intervention. FINAL IMPRESSION      1. Hypoglycemia          DISPOSITION/PLAN   DISPOSITION Decision To Discharge 04/01/2019 12:59:35 AM      PATIENT REFERRED TO:  OXANA Hsu CNP  [address] [place] [id number]  [phone]    Schedule an appointment as soon as possible for a visit in 2 days  As needed      DISCHARGE MEDICATIONS:  New Prescriptions    No medications on file       Comment: Please note this report has been produced using speech recognition software and may contain errors related tothat system including errors in grammar, punctuation, and spelling, as well as words and phrases that may be inappropriate.  If there are any questions or concerns please feel free to contact the dictating provider forclarification.     Diana Carrasco MD  Attending Emergency Physician                  Diana Carrasco MD  04/01/19 0109

## 2019-04-01 NOTE — ED NOTES
Pt refused any IV access or blood draw. Dr Sanabria Handsome with the pt and talk to him about plan of treatment for his BGL trends.       Melita Law RN  04/01/19 0107

## 2019-04-04 ENCOUNTER — HOSPITAL ENCOUNTER (OUTPATIENT)
Facility: HOSPITAL | Age: 54
Discharge: HOME | End: 2019-04-04
Payer: MEDICARE

## 2019-04-04 LAB
A/G RATIO: 1.4 (ref 0.8–2)
ALBUMIN SERPL-MCNC: 3.9 G/DL (ref 3.4–4.8)
ALP BLD-CCNC: 210 U/L (ref 25–100)
ALT SERPL-CCNC: 23 U/L (ref 4–36)
ANION GAP SERPL CALCULATED.3IONS-SCNC: 12 MMOL/L (ref 3–16)
AST SERPL-CCNC: 14 U/L (ref 8–33)
BILIRUB SERPL-MCNC: <0.2 MG/DL (ref 0.3–1.2)
BUN BLDV-MCNC: 8 MG/DL (ref 6–20)
CALCIUM SERPL-MCNC: 9.4 MG/DL (ref 8.5–10.5)
CHLORIDE BLD-SCNC: 105 MMOL/L (ref 98–107)
CHOLESTEROL, TOTAL: 150 MG/DL (ref 0–200)
CO2: 24 MMOL/L (ref 20–30)
CREAT SERPL-MCNC: 0.8 MG/DL (ref 0.4–1.2)
GFR AFRICAN AMERICAN: >59
GFR NON-AFRICAN AMERICAN: >59
GLOBULIN: 2.7 G/DL
GLUCOSE BLD-MCNC: 191 MG/DL (ref 74–106)
HBA1C MFR BLD: 8 %
HCT VFR BLD CALC: 28 % (ref 40–54)
HDLC SERPL-MCNC: 43 MG/DL (ref 40–60)
HEMOGLOBIN: 8.5 G/DL (ref 13–18)
LDL CHOLESTEROL CALCULATED: 71 MG/DL
MCH RBC QN AUTO: 22 PG (ref 27–32)
MCHC RBC AUTO-ENTMCNC: 30.4 G/DL (ref 31–35)
MCV RBC AUTO: 72.5 FL (ref 80–100)
PDW BLD-RTO: 18.3 % (ref 11–16)
PLATELET # BLD: 440 K/UL (ref 150–400)
PMV BLD AUTO: 10.5 FL (ref 6–10)
POTASSIUM SERPL-SCNC: 4 MMOL/L (ref 3.4–5.1)
RBC # BLD: 3.86 M/UL (ref 4.5–6)
SODIUM BLD-SCNC: 141 MMOL/L (ref 136–145)
TOTAL PROTEIN: 6.6 G/DL (ref 6.4–8.3)
TRIGL SERPL-MCNC: 180 MG/DL (ref 0–249)
TSH SERPL DL<=0.05 MIU/L-ACNC: 0.52 UIU/ML (ref 0.35–5.5)
VLDLC SERPL CALC-MCNC: 36 MG/DL
WBC # BLD: 8.3 K/UL (ref 4–11)

## 2019-04-04 PROCEDURE — 80061 LIPID PANEL: CPT

## 2019-04-04 PROCEDURE — 85027 COMPLETE CBC AUTOMATED: CPT

## 2019-04-04 PROCEDURE — 80053 COMPREHEN METABOLIC PANEL: CPT

## 2019-04-04 PROCEDURE — 83036 HEMOGLOBIN GLYCOSYLATED A1C: CPT

## 2019-04-04 PROCEDURE — 36415 COLL VENOUS BLD VENIPUNCTURE: CPT

## 2019-04-04 PROCEDURE — 84443 ASSAY THYROID STIM HORMONE: CPT

## 2019-04-09 DIAGNOSIS — IMO0002 UNCONTROLLED TYPE 1 DIABETES MELLITUS WITH COMPLICATION: ICD-10-CM

## 2019-04-09 DIAGNOSIS — IMO0002 UNCONTROLLED TYPE 1 DIABETES MELLITUS WITH COMPLICATION: Primary | ICD-10-CM

## 2019-04-09 RX ORDER — INSULIN ASPART 100 [IU]/ML
INJECTION, SOLUTION INTRAVENOUS; SUBCUTANEOUS
Qty: 10 PEN | Refills: 1 | Status: SHIPPED | OUTPATIENT
Start: 2019-04-09 | End: 2019-08-29 | Stop reason: HOSPADM

## 2019-04-09 RX ORDER — PEN NEEDLE, DIABETIC 30 GX3/16"
1 NEEDLE, DISPOSABLE MISCELLANEOUS
Qty: 300 EACH | Refills: 1 | Status: ON HOLD | OUTPATIENT
Start: 2019-04-09 | End: 2019-08-16

## 2019-04-09 NOTE — TELEPHONE ENCOUNTER
PT. ALSO NEEDS REFILLS ON HIS HUMALOG PENS AND THE HUMULIN VILES SENT TO [facility] MAIL DELIV[facility]; PLEASE CALL PT. ONCE COMPLETED [phone]; PT. STATED THAT HE APOLOGIZES FOR MISSING HIS LAST 2 APPOINTMENTS AND THAT HE HAS HAD SOME ISSUES WITH HIS LEG BEING AMPUTATED

## 2019-04-09 NOTE — TELEPHONE ENCOUNTER
Maryana,     Please make sure patient has a follow-up appt with me, August is fine unless you see an opening sooner. Do not schedule with Carey.  I have refilled his prescriptions, but I will not refill again.     Thanks  Shana Monahan

## 2019-04-14 ENCOUNTER — HOSPITAL ENCOUNTER (EMERGENCY)
Facility: HOSPITAL | Age: 54
Discharge: HOME | End: 2019-04-14
Attending: HOSPITALIST
Payer: MEDICARE

## 2019-04-14 VITALS
TEMPERATURE: 96.9 F | RESPIRATION RATE: 16 BRPM | BODY MASS INDEX: 20.46 KG/M2 | DIASTOLIC BLOOD PRESSURE: 81 MMHG | OXYGEN SATURATION: 99 % | SYSTOLIC BLOOD PRESSURE: 141 MMHG | HEART RATE: 107 BPM | HEIGHT: 68 IN | WEIGHT: 135 LBS

## 2019-04-14 DIAGNOSIS — R40.4 TRANSIENT ALTERATION OF AWARENESS: Primary | ICD-10-CM

## 2019-04-14 DIAGNOSIS — E16.2 HYPOGLYCEMIA: ICD-10-CM

## 2019-04-14 PROCEDURE — 6370000000 HC RX 637 (ALT 250 FOR IP): Performed by: HOSPITALIST

## 2019-04-14 PROCEDURE — 2500000003 HC RX 250 WO HCPCS

## 2019-04-14 PROCEDURE — 99284 EMERGENCY DEPT VISIT MOD MDM: CPT

## 2019-04-14 RX ORDER — OXYCODONE HYDROCHLORIDE AND ACETAMINOPHEN 5; 325 MG/1; MG/1
2 TABLET ORAL ONCE
Status: COMPLETED | OUTPATIENT
Start: 2019-04-14 | End: 2019-04-14

## 2019-04-14 RX ADMIN — OXYCODONE HYDROCHLORIDE AND ACETAMINOPHEN 2 TABLET: 5; 325 TABLET ORAL at 13:09

## 2019-04-14 ASSESSMENT — PAIN SCALES - GENERAL
PAINLEVEL_OUTOF10: 9
PAINLEVEL_OUTOF10: 10

## 2019-04-14 ASSESSMENT — PAIN DESCRIPTION - LOCATION: LOCATION: LEG

## 2019-04-14 ASSESSMENT — PAIN DESCRIPTION - ONSET: ONSET: ON-GOING

## 2019-04-14 ASSESSMENT — PAIN DESCRIPTION - DESCRIPTORS: DESCRIPTORS: ACHING

## 2019-04-14 ASSESSMENT — PAIN DESCRIPTION - PAIN TYPE: TYPE: ACUTE PAIN

## 2019-04-14 ASSESSMENT — PAIN DESCRIPTION - PROGRESSION: CLINICAL_PROGRESSION: GRADUALLY WORSENING

## 2019-04-14 ASSESSMENT — PAIN DESCRIPTION - FREQUENCY: FREQUENCY: CONTINUOUS

## 2019-04-14 NOTE — ED PROVIDER NOTES
ZOLPIDEM (AMBIEN) 10 MG TABLET    Take by mouth nightly as needed for Sleep.        ALLERGIES     Phenergan [promethazine hcl]    FAMILY HISTORY       Family History   Problem Relation Age of Onset    Diabetes Mother     Cancer Father         leukemia          SOCIAL HISTORY       Social History     Socioeconomic History    Marital status:      Spouse name: Not on file    Number of children: Not on file    Years of education: Not on file    Highest education level: Not on file   Occupational History    Not on file   Social Needs    Financial resource strain: Not on file    Food insecurity:     Worry: Not on file     Inability: Not on file    Transportation needs:     Medical: Not on file     Non-medical: Not on file   Tobacco Use    Smoking status: Current Every Day Smoker     Packs/day: 0.25     Years: 30.00     Pack years: 7.50     Types: Cigarettes    Smokeless tobacco: Never Used   Substance and Sexual Activity    Alcohol use: No    Drug use: No    Sexual activity: Not Currently   Lifestyle    Physical activity:     Days per week: Not on file     Minutes per session: Not on file    Stress: Not on file   Relationships    Social connections:     Talks on phone: Not on file     Gets together: Not on file     Attends Quaker service: Not on file     Active member of club or organization: Not on file     Attends meetings of clubs or organizations: Not on file     Relationship status: Not on file    Intimate partner violence:     Fear of current or ex partner: Not on file     Emotionally abused: Not on file     Physically abused: Not on file     Forced sexual activity: Not on file   Other Topics Concern    Not on file   Social History Narrative    Not on file         PHYSICAL EXAM    (up to 7 for level 4, 8 or more for level 5)     ED Triage Vitals   BP Temp Temp src Pulse Resp SpO2 Height Weight   -- -- -- -- -- -- -- --       Physical Exam    Patient elected not to have physical exam or blood there anyway. Patient was offered of food tray here secondary to his hypoglycemia which she declined he states that he is not hungry at all. Again the patient was offered evaluation and examination but declined and wants to be discharged at this time since he states he feels perfectly fine. The patient will follow-up with their PCP in 1-2 days for reevaluation. Patient advised if symptoms worsens or new symptoms arise he should return back to emergency department for further evaluation workup. CONSULTS:  None    PROCEDURES:  Procedures    CRITICAL CARE TIME    Total Critical Care time was 0 minutes, excluding separately reportable procedures. There was a high probability of clinically significant/life threatening deterioration in the patient's condition which required my urgent intervention. FINAL IMPRESSION      1. Transient alteration of awareness    2. Hypoglycemia          DISPOSITION/PLAN   DISPOSITION        PATIENT REFERRED TO:  OXANA Miller - CNP  [address]  [phone]    In 2 days      [facility] Emergency Department  [facility] [address] [facility]  [phone]    As needed, If symptoms worsen      DISCHARGE MEDICATIONS:  New Prescriptions    No medications on file       Comment: Please note this report has been produced using speech recognition software and may contain errorsrelated to that system including errors in grammar, punctuation, and spelling, as well as words and phrases that may be inappropriate. If there are any questions or concerns please feel free to contact the dictating providerfor clarification.     Erika Morse DO  Attending Emergency Physician              Erika Morse DO  04/14/19 4965

## 2019-04-14 NOTE — ED NOTES
Patient states that he does not want a meal that his family is here and he wants to go to [employer] to eat as soon as he leaves here.      Carmen Smith RN  04/14/19 0928

## 2019-04-25 ENCOUNTER — HOSPITAL ENCOUNTER (OUTPATIENT)
Facility: HOSPITAL | Age: 54
Discharge: HOME | End: 2019-04-25
Payer: MEDICARE

## 2019-04-25 LAB
BASOPHILS ABSOLUTE: 0.1 K/UL (ref 0–0.1)
BASOPHILS RELATIVE PERCENT: 0.9 %
EOSINOPHILS ABSOLUTE: 0.1 K/UL (ref 0–0.4)
EOSINOPHILS RELATIVE PERCENT: 0.9 %
FERRITIN: 8.9 NG/ML (ref 22–322)
FOLATE: >20 NG/ML
HCT VFR BLD CALC: 29.4 % (ref 40–54)
HEMOGLOBIN: 8.4 G/DL (ref 13–18)
IMMATURE GRANULOCYTES #: 0 K/UL
IMMATURE GRANULOCYTES %: 0.3 % (ref 0–5)
IRON: 17 UG/DL (ref 59–158)
LYMPHOCYTES ABSOLUTE: 1.4 K/UL (ref 1.5–4)
LYMPHOCYTES RELATIVE PERCENT: 17.4 %
MCH RBC QN AUTO: 20.1 PG (ref 27–32)
MCHC RBC AUTO-ENTMCNC: 28.6 G/DL (ref 31–35)
MCV RBC AUTO: 70.5 FL (ref 80–100)
MONOCYTES ABSOLUTE: 0.6 K/UL (ref 0.2–0.8)
MONOCYTES RELATIVE PERCENT: 7 %
NEUTROPHILS ABSOLUTE: 5.8 K/UL (ref 2–7.5)
NEUTROPHILS RELATIVE PERCENT: 73.5 %
PDW BLD-RTO: 19.3 % (ref 11–16)
PLATELET # BLD: 392 K/UL (ref 150–400)
PMV BLD AUTO: 10.6 FL (ref 6–10)
RBC # BLD: 4.17 M/UL (ref 4.5–6)
TOTAL IRON BINDING CAPACITY: 388 UG/DL (ref 250–450)
VITAMIN B-12: 457 PG/ML (ref 211–911)
WBC # BLD: 7.9 K/UL (ref 4–11)

## 2019-04-25 PROCEDURE — 85025 COMPLETE CBC W/AUTO DIFF WBC: CPT

## 2019-04-25 PROCEDURE — 36415 COLL VENOUS BLD VENIPUNCTURE: CPT

## 2019-04-25 PROCEDURE — 82728 ASSAY OF FERRITIN: CPT

## 2019-04-25 PROCEDURE — 83540 ASSAY OF IRON: CPT

## 2019-04-25 PROCEDURE — 82746 ASSAY OF FOLIC ACID SERUM: CPT

## 2019-04-25 PROCEDURE — 82607 VITAMIN B-12: CPT

## 2019-04-25 PROCEDURE — 83550 IRON BINDING TEST: CPT

## 2019-05-03 ENCOUNTER — HOSPITAL ENCOUNTER (OUTPATIENT)
Facility: HOSPITAL | Age: 54
Discharge: HOME | End: 2019-05-03
Payer: MEDICARE

## 2019-05-03 LAB
AMMONIA: 67 MCG/DL (ref 27–102)
BASOPHILS ABSOLUTE: 0.1 K/UL (ref 0–0.1)
BASOPHILS RELATIVE PERCENT: 0.8 %
EOSINOPHILS ABSOLUTE: 0.1 K/UL (ref 0–0.4)
EOSINOPHILS RELATIVE PERCENT: 1.4 %
HCT VFR BLD CALC: 30.6 % (ref 40–54)
HEMOGLOBIN: 8.7 G/DL (ref 13–18)
IMMATURE GRANULOCYTES #: 0 K/UL
IMMATURE GRANULOCYTES %: 0.3 % (ref 0–5)
LYMPHOCYTES ABSOLUTE: 2 K/UL (ref 1.5–4)
LYMPHOCYTES RELATIVE PERCENT: 27.7 %
MCH RBC QN AUTO: 19.8 PG (ref 27–32)
MCHC RBC AUTO-ENTMCNC: 28.4 G/DL (ref 31–35)
MCV RBC AUTO: 69.5 FL (ref 80–100)
MONOCYTES ABSOLUTE: 0.5 K/UL (ref 0.2–0.8)
MONOCYTES RELATIVE PERCENT: 7.3 %
NEUTROPHILS ABSOLUTE: 4.6 K/UL (ref 2–7.5)
NEUTROPHILS RELATIVE PERCENT: 62.5 %
PDW BLD-RTO: 19.4 % (ref 11–16)
PLATELET # BLD: 338 K/UL (ref 150–400)
PMV BLD AUTO: 10.9 FL (ref 6–10)
RBC # BLD: 4.4 M/UL (ref 4.5–6)
WBC # BLD: 7.4 K/UL (ref 4–11)

## 2019-05-03 PROCEDURE — 85025 COMPLETE CBC W/AUTO DIFF WBC: CPT

## 2019-05-03 PROCEDURE — 36415 COLL VENOUS BLD VENIPUNCTURE: CPT

## 2019-05-03 PROCEDURE — 82140 ASSAY OF AMMONIA: CPT

## 2019-05-22 ENCOUNTER — HOSPITAL ENCOUNTER (OUTPATIENT)
Facility: HOSPITAL | Age: 54
Discharge: HOME | End: 2019-05-22
Payer: MEDICARE

## 2019-05-22 LAB
BASOPHILS ABSOLUTE: 0.1 K/UL (ref 0–0.1)
BASOPHILS RELATIVE PERCENT: 1 %
EOSINOPHILS ABSOLUTE: 0.1 K/UL (ref 0–0.4)
EOSINOPHILS RELATIVE PERCENT: 1.4 %
HCT VFR BLD CALC: 30 % (ref 40–54)
HEMOGLOBIN: 8.3 G/DL (ref 13–18)
IMMATURE GRANULOCYTES #: 0 K/UL
IMMATURE GRANULOCYTES %: 0.2 % (ref 0–5)
IRON: 13 UG/DL (ref 59–158)
LYMPHOCYTES ABSOLUTE: 1.6 K/UL (ref 1.5–4)
LYMPHOCYTES RELATIVE PERCENT: 26.2 %
MCH RBC QN AUTO: 18.9 PG (ref 27–32)
MCHC RBC AUTO-ENTMCNC: 27.7 G/DL (ref 31–35)
MCV RBC AUTO: 68.3 FL (ref 80–100)
MONOCYTES ABSOLUTE: 0.6 K/UL (ref 0.2–0.8)
MONOCYTES RELATIVE PERCENT: 10.3 %
NEUTROPHILS ABSOLUTE: 3.8 K/UL (ref 2–7.5)
NEUTROPHILS RELATIVE PERCENT: 60.9 %
PDW BLD-RTO: 19.1 % (ref 11–16)
PLATELET # BLD: 283 K/UL (ref 150–400)
PMV BLD AUTO: 10.6 FL (ref 6–10)
RBC # BLD: 4.39 M/UL (ref 4.5–6)
TOTAL IRON BINDING CAPACITY: 391 UG/DL (ref 250–450)
WBC # BLD: 6.2 K/UL (ref 4–11)

## 2019-05-22 PROCEDURE — 83550 IRON BINDING TEST: CPT

## 2019-05-22 PROCEDURE — 83540 ASSAY OF IRON: CPT

## 2019-05-22 PROCEDURE — 85025 COMPLETE CBC W/AUTO DIFF WBC: CPT

## 2019-06-01 DIAGNOSIS — IMO0002 UNCONTROLLED TYPE 1 DIABETES MELLITUS WITH COMPLICATION: ICD-10-CM

## 2019-06-01 RX ORDER — BLOOD SUGAR DIAGNOSTIC
STRIP MISCELLANEOUS
Qty: 100 EACH | Refills: 0 | Status: ON HOLD | OUTPATIENT
Start: 2019-06-01 | End: 2019-08-16

## 2019-06-01 RX ORDER — PEN NEEDLE, DIABETIC 29 G X1/2"
NEEDLE, DISPOSABLE MISCELLANEOUS
Qty: 180 EACH | Refills: 1 | Status: SHIPPED | OUTPATIENT
Start: 2019-06-01 | End: 2019-07-21

## 2019-06-14 ENCOUNTER — HOSPITAL ENCOUNTER (OUTPATIENT)
Facility: HOSPITAL | Age: 54
Discharge: HOME | End: 2019-06-14
Payer: MEDICARE

## 2019-06-14 LAB
A/G RATIO: 1.7 (ref 0.8–2)
ALBUMIN SERPL-MCNC: 4.3 G/DL (ref 3.4–4.8)
ALP BLD-CCNC: 179 U/L (ref 25–100)
ALT SERPL-CCNC: 13 U/L (ref 4–36)
ANION GAP SERPL CALCULATED.3IONS-SCNC: 16 MMOL/L (ref 3–16)
AST SERPL-CCNC: 26 U/L (ref 8–33)
BASOPHILS ABSOLUTE: 0.1 K/UL (ref 0–0.1)
BASOPHILS RELATIVE PERCENT: 0.6 %
BILIRUB SERPL-MCNC: <0.2 MG/DL (ref 0.3–1.2)
BILIRUBIN URINE: NEGATIVE
BLOOD, URINE: NEGATIVE
BUN BLDV-MCNC: 14 MG/DL (ref 6–20)
CALCIUM SERPL-MCNC: 9.3 MG/DL (ref 8.5–10.5)
CHLORIDE BLD-SCNC: 99 MMOL/L (ref 98–107)
CLARITY: CLEAR
CO2: 23 MMOL/L (ref 20–30)
COLOR: YELLOW
CREAT SERPL-MCNC: 0.8 MG/DL (ref 0.4–1.2)
EOSINOPHILS ABSOLUTE: 0.3 K/UL (ref 0–0.4)
EOSINOPHILS RELATIVE PERCENT: 3.5 %
GFR AFRICAN AMERICAN: >59
GFR NON-AFRICAN AMERICAN: >59
GLOBULIN: 2.6 G/DL
GLUCOSE BLD-MCNC: 9 MG/DL (ref 74–106)
GLUCOSE URINE: NEGATIVE MG/DL
HBA1C MFR BLD: 6.8 %
HCT VFR BLD CALC: 38.2 % (ref 40–54)
HEMOGLOBIN: 10.4 G/DL (ref 13–18)
IMMATURE GRANULOCYTES #: 0 K/UL
IMMATURE GRANULOCYTES %: 0.4 % (ref 0–5)
IRON: 35 UG/DL (ref 59–158)
KETONES, URINE: NEGATIVE MG/DL
LEUKOCYTE ESTERASE, URINE: NEGATIVE
LYMPHOCYTES ABSOLUTE: 3 K/UL (ref 1.5–4)
LYMPHOCYTES RELATIVE PERCENT: 37.9 %
MCH RBC QN AUTO: 20.1 PG (ref 27–32)
MCHC RBC AUTO-ENTMCNC: 27.2 G/DL (ref 31–35)
MCV RBC AUTO: 73.9 FL (ref 80–100)
MICROSCOPIC EXAMINATION: NORMAL
MONOCYTES ABSOLUTE: 0.7 K/UL (ref 0.2–0.8)
MONOCYTES RELATIVE PERCENT: 8.3 %
NEUTROPHILS ABSOLUTE: 3.9 K/UL (ref 2–7.5)
NEUTROPHILS RELATIVE PERCENT: 49.3 %
NITRITE, URINE: NEGATIVE
PDW BLD-RTO: 28.9 % (ref 11–16)
PH UA: 5.5 (ref 5–8)
PLATELET # BLD: 294 K/UL (ref 150–400)
POTASSIUM SERPL-SCNC: 4.5 MMOL/L (ref 3.4–5.1)
PROTEIN UA: NEGATIVE MG/DL
RBC # BLD: 5.17 M/UL (ref 4.5–6)
SODIUM BLD-SCNC: 138 MMOL/L (ref 136–145)
SPECIFIC GRAVITY UA: 1.01 (ref 1–1.03)
TOTAL IRON BINDING CAPACITY: 362 UG/DL (ref 250–450)
TOTAL PROTEIN: 6.9 G/DL (ref 6.4–8.3)
TSH SERPL DL<=0.05 MIU/L-ACNC: 1.16 UIU/ML (ref 0.35–5.5)
URINE REFLEX TO CULTURE: NORMAL
URINE TYPE: NORMAL
UROBILINOGEN, URINE: 0.2 E.U./DL
VITAMIN B-12: 301 PG/ML (ref 211–911)
WBC # BLD: 8 K/UL (ref 4–11)

## 2019-06-14 PROCEDURE — 36415 COLL VENOUS BLD VENIPUNCTURE: CPT

## 2019-06-14 PROCEDURE — 80053 COMPREHEN METABOLIC PANEL: CPT

## 2019-06-14 PROCEDURE — 83540 ASSAY OF IRON: CPT

## 2019-06-14 PROCEDURE — 84443 ASSAY THYROID STIM HORMONE: CPT

## 2019-06-14 PROCEDURE — 81003 URINALYSIS AUTO W/O SCOPE: CPT

## 2019-06-14 PROCEDURE — 83036 HEMOGLOBIN GLYCOSYLATED A1C: CPT

## 2019-06-14 PROCEDURE — 83550 IRON BINDING TEST: CPT

## 2019-06-14 PROCEDURE — 85025 COMPLETE CBC W/AUTO DIFF WBC: CPT

## 2019-06-14 PROCEDURE — 82607 VITAMIN B-12: CPT

## 2019-06-15 ENCOUNTER — HOSPITAL ENCOUNTER (EMERGENCY)
Facility: HOSPITAL | Age: 54
Discharge: HOME | End: 2019-06-15
Attending: EMERGENCY MEDICINE
Payer: MEDICARE

## 2019-06-15 VITALS
SYSTOLIC BLOOD PRESSURE: 97 MMHG | BODY MASS INDEX: 19.77 KG/M2 | WEIGHT: 130 LBS | TEMPERATURE: 98.2 F | DIASTOLIC BLOOD PRESSURE: 59 MMHG | OXYGEN SATURATION: 98 % | RESPIRATION RATE: 18 BRPM | HEART RATE: 99 BPM

## 2019-06-15 DIAGNOSIS — L03.115 CELLULITIS OF RIGHT KNEE: Primary | ICD-10-CM

## 2019-06-15 LAB
A/G RATIO: 1.5 (ref 0.8–2)
ALBUMIN SERPL-MCNC: 4 G/DL (ref 3.4–4.8)
ALP BLD-CCNC: 188 U/L (ref 25–100)
ALT SERPL-CCNC: 12 U/L (ref 4–36)
ANION GAP SERPL CALCULATED.3IONS-SCNC: 13 MMOL/L (ref 3–16)
AST SERPL-CCNC: 21 U/L (ref 8–33)
BASOPHILS ABSOLUTE: 0.1 K/UL (ref 0–0.1)
BASOPHILS RELATIVE PERCENT: 0.6 %
BILIRUB SERPL-MCNC: <0.2 MG/DL (ref 0.3–1.2)
BUN BLDV-MCNC: 12 MG/DL (ref 6–20)
CALCIUM SERPL-MCNC: 9.1 MG/DL (ref 8.5–10.5)
CHLORIDE BLD-SCNC: 100 MMOL/L (ref 98–107)
CO2: 23 MMOL/L (ref 20–30)
CREAT SERPL-MCNC: 0.7 MG/DL (ref 0.4–1.2)
EOSINOPHILS ABSOLUTE: 0.2 K/UL (ref 0–0.4)
EOSINOPHILS RELATIVE PERCENT: 2 %
GFR AFRICAN AMERICAN: >59
GFR NON-AFRICAN AMERICAN: >59
GLOBULIN: 2.6 G/DL
GLUCOSE BLD-MCNC: 140 MG/DL (ref 74–106)
GLUCOSE BLD-MCNC: 179 MG/DL (ref 74–106)
HCT VFR BLD CALC: 37.5 % (ref 40–54)
HEMOGLOBIN: 10.9 G/DL (ref 13–18)
IMMATURE GRANULOCYTES #: 0 K/UL
IMMATURE GRANULOCYTES %: 0.4 % (ref 0–5)
LYMPHOCYTES ABSOLUTE: 1.5 K/UL (ref 1.5–4)
LYMPHOCYTES RELATIVE PERCENT: 19.2 %
MCH RBC QN AUTO: 20.7 PG (ref 27–32)
MCHC RBC AUTO-ENTMCNC: 29.1 G/DL (ref 31–35)
MCV RBC AUTO: 71.3 FL (ref 80–100)
MONOCYTES ABSOLUTE: 0.6 K/UL (ref 0.2–0.8)
MONOCYTES RELATIVE PERCENT: 7.6 %
NEUTROPHILS ABSOLUTE: 5.5 K/UL (ref 2–7.5)
NEUTROPHILS RELATIVE PERCENT: 70.2 %
PDW BLD-RTO: 28.3 % (ref 11–16)
PERFORMED ON: ABNORMAL
PLATELET # BLD: 385 K/UL (ref 150–400)
PMV BLD AUTO: 10.1 FL (ref 6–10)
POTASSIUM REFLEX MAGNESIUM: 4.4 MMOL/L (ref 3.4–5.1)
RBC # BLD: 5.26 M/UL (ref 4.5–6)
SODIUM BLD-SCNC: 136 MMOL/L (ref 136–145)
TOTAL PROTEIN: 6.6 G/DL (ref 6.4–8.3)
WBC # BLD: 7.8 K/UL (ref 4–11)

## 2019-06-15 PROCEDURE — 6370000000 HC RX 637 (ALT 250 FOR IP)

## 2019-06-15 PROCEDURE — 6370000000 HC RX 637 (ALT 250 FOR IP): Performed by: EMERGENCY MEDICINE

## 2019-06-15 PROCEDURE — 99283 EMERGENCY DEPT VISIT LOW MDM: CPT

## 2019-06-15 PROCEDURE — 36415 COLL VENOUS BLD VENIPUNCTURE: CPT

## 2019-06-15 PROCEDURE — 80053 COMPREHEN METABOLIC PANEL: CPT

## 2019-06-15 PROCEDURE — 85025 COMPLETE CBC W/AUTO DIFF WBC: CPT

## 2019-06-15 RX ORDER — CEPHALEXIN 500 MG/1
500 CAPSULE ORAL ONCE
Status: COMPLETED | OUTPATIENT
Start: 2019-06-15 | End: 2019-06-15

## 2019-06-15 RX ORDER — SULFAMETHOXAZOLE AND TRIMETHOPRIM 800; 160 MG/1; MG/1
2 TABLET ORAL 2 TIMES DAILY
Qty: 28 TABLET | Refills: 0 | Status: SHIPPED | OUTPATIENT
Start: 2019-06-15 | End: 2019-06-22

## 2019-06-15 RX ORDER — CEPHALEXIN 500 MG/1
500 CAPSULE ORAL 4 TIMES DAILY
Qty: 28 CAPSULE | Refills: 0 | Status: SHIPPED | OUTPATIENT
Start: 2019-06-15 | End: 2019-06-22

## 2019-06-15 RX ORDER — SULFAMETHOXAZOLE AND TRIMETHOPRIM 800; 160 MG/1; MG/1
2 TABLET ORAL EVERY 12 HOURS SCHEDULED
Status: DISCONTINUED | OUTPATIENT
Start: 2019-06-15 | End: 2019-06-15 | Stop reason: HOSPADM

## 2019-06-15 RX ORDER — SULFAMETHOXAZOLE AND TRIMETHOPRIM 800; 160 MG/1; MG/1
TABLET ORAL
Status: COMPLETED
Start: 2019-06-15 | End: 2019-06-15

## 2019-06-15 RX ADMIN — SULFAMETHOXAZOLE AND TRIMETHOPRIM 2 TABLET: 800; 160 TABLET ORAL at 14:39

## 2019-06-15 RX ADMIN — CEPHALEXIN 500 MG: 500 CAPSULE ORAL at 14:38

## 2019-06-15 ASSESSMENT — PAIN DESCRIPTION - ORIENTATION: ORIENTATION: RIGHT

## 2019-06-15 ASSESSMENT — PAIN DESCRIPTION - PAIN TYPE: TYPE: CHRONIC PAIN

## 2019-06-15 ASSESSMENT — PAIN DESCRIPTION - LOCATION: LOCATION: LEG

## 2019-06-15 ASSESSMENT — PAIN SCALES - GENERAL: PAINLEVEL_OUTOF10: 8

## 2019-06-15 NOTE — ED TRIAGE NOTES
Patient stated home health contacted him last night and instructed him to come to the hospital related to low blood glucose. FSBG at this time 179. Patient also has dime sized ulcer to right knee with surrounding redness. Patient stated he has had this ulcer approx 3 weeks. Patient denies any fever. Patient does have right below knee amputation.

## 2019-06-15 NOTE — ED NOTES
Spoke with Davide Roldan RN who is nurse with [facility]. She stated she has tried multiple times to get patient to come to ed related to hypoglycemia and he has refused.      Marylou Yepez RN  06/15/19 4416

## 2019-06-15 NOTE — ED PROVIDER NOTES
[address] [facility] ENCOUNTER      Pt Name: Akil Benedict  MRN: [id number]  YOB: 1965  Date of evaluation: 6/15/2019  Provider: Luis Lugo DO    CHIEF COMPLAINT       Chief Complaint   Patient presents with    Skin Ulcer     right knee          HISTORY OF PRESENT ILLNESS  (Location/Symptom, Timing/Onset, Context/Setting, Quality, Duration, Modifying Factors, Severity.)   Akil Benedict is a 52 y.o. male who presents to the emergency department for evaluation of a sore read wound area to the right knee. The patient follows with orthopedic Specialist Dr. Chaz Sidhu, and has had a below-knee amputation of one year ago. He has had issues with recurrent skin infections around the site. He notes a small reddened area around a chronic ulceration on the patella on the right knee. He denies a fever, chills, nausea or vomiting, no recent illness or recent antibiotic usage. Denies any injury or trauma. He has no other acute systemic complaints this time. The patient was told his blood sugar was low on metabolic panel from yesterday's labs, he has a history of difficult to control diabetes and has had issues with hypoglycemia in the past he denies any symptoms from this and has been eating and drinking small meals, acutely asymptomatic. Nursing notes were reviewed.     REVIEW OFSYSTEMS    (2-9 systems for level 4, 10 or more for level 5)   ROS:  General:  No fevers, no chills, no weakness  Cardiovascular:  No chest pain, no palpitations  Respiratory:  No shortness of breath, no cough, no wheezing  Gastrointestinal:  No pain, no nausea, no vomiting, no diarrhea  Musculoskeletal:  No muscle pain, no joint pain  Skin:  No rash, no easy bruising  Neurologic:  No speech problems, no headache, no extremity weakness  Psychiatric:  No anxiety  Genitourinary:  No dysuria, no hematuria    Except as noted above the remainder of the review of systems was reviewed and Spouse name: Not on file    Number of children: Not on file    Years of education: Not on file    Highest education level: Not on file   Occupational History    Not on file   Social Needs    Financial resource strain: Not on file    Food insecurity:     Worry: Not on file     Inability: Not on file    Transportation needs:     Medical: Not on file     Non-medical: Not on file   Tobacco Use    Smoking status: Current Every Day Smoker     Packs/day: 0.50     Years: 30.00     Pack years: 15.00     Types: Cigarettes    Smokeless tobacco: Never Used   Substance and Sexual Activity    Alcohol use: No    Drug use: No    Sexual activity: Not Currently   Lifestyle    Physical activity:     Days per week: Not on file     Minutes per session: Not on file    Stress: Not on file   Relationships    Social connections:     Talks on phone: Not on file     Gets together: Not on file     Attends [employer] service: Not on file     Active member of club or organization: Not on file     Attends meetings of clubs or organizations: Not on file     Relationship status: Not on file    Intimate partner violence:     Fear of current or ex partner: Not on file     Emotionally abused: Not on file     Physically abused: Not on file     Forced sexual activity: Not on file   Other Topics Concern    Not on file   Social History Narrative    Not on file         PHYSICAL EXAM    (up to 7 for level 4, 8 or more for level 5)     ED Triage Vitals [06/15/19 1322]   BP Temp Temp Source Pulse Resp SpO2 Height Weight   122/78 98.2 °F (36.8 °C) Temporal 121 18 100 % -- 130 lb (59 kg)       Physical Exam  General :Patient is awake, alert, oriented, in no acute distress, nontoxic appearing  HEENT: Pupils are equally round and reactive to light, EOMI, conjunctivae clear. Oral mucosa is moist, no exudate.  Uvula is midline  Neck: Neck is supple, full range of motion, trachea midline  Cardiac: Heart regular rate, rhythm, no murmurs, rubs, or gallops  Lungs: Lungs are clear to auscultation, there is no wheezing, rhonchi, or rales. There is no use of accessory muscles. Chest wall: There is no tenderness to palpation over the chest wall or over ribs  Abdomen: Abdomen is soft, nontender, nondistended. Surgical changes to the mid abdominal region. There is no firm or pulsatile masses, no rebound rigidity or guarding. Musculoskeletal: Right leg with a below knee amputation, stump is intact and clean, and there is an area overlying the right patella with a chronic ulceration proximal 1 cm in diameter with some mild surrounding cellulitic changes without any signs of abscess, no streaking up the leg, no active bleeding or drainage. 5 out of 5 strength in all 4 extremities. No focal muscle deficits are appreciated  Neuro: Motor intact, sensory intact, level of consciousness is normal  Dermatology: Skin is warm and dry  Psych: Mentation is grossly normal, cognition is grossly normal. Affect is appropriate.       DIAGNOSTIC RESULTS     EKG: All EKG's are interpreted by the Emergency Department Physician who either signs or Co-signs this chart in the [address] a cardiologist.        RADIOLOGY:   Non-plain film images such as CT, Ultrasound and MRI are read by the radiologist. ChristineSplit radiographic images are visualized and preliminarily interpreted by the emergency physician with the below findings:      ? Radiologist's Report Reviewed:  No orders to display         ED BEDSIDE ULTRASOUND:   Performed by ED Physician - none    LABS:    I have reviewed and interpreted all of the currently available lab results from this visit (ifapplicable):  Results for orders placed or performed during the hospital encounter of 06/15/19   CBC Auto Differential   Result Value Ref Range    WBC 7.8 4.0 - 11.0 K/uL    RBC 5.26 4.50 - 6.00 M/uL    Hemoglobin 10.9 (L) 13.0 - 18.0 g/dL    Hematocrit 37.5 (L) 40.0 - 54.0 %    MCV 71.3 (L) 80.0 - 100.0 fL    MCH 20.7 (L) 27.0 - 32.0 pg MCHC 29.1 (L) 31.0 - 35.0 g/dL    RDW 28.3 (H) 11.0 - 16.0 %    Platelets 692 535 - 989 K/uL    MPV 10.1 (H) 6.0 - 10.0 fL    Neutrophils % 70.2 %    Immature Granulocytes % 0.4 0.0 - 5.0 %    Lymphocytes % 19.2 %    Monocytes % 7.6 %    Eosinophils % 2.0 %    Basophils % 0.6 %    Neutrophils # 5.5 2.0 - 7.5 K/uL    Immature Granulocytes # 0.0 K/uL    Lymphocytes # 1.5 1.5 - 4.0 K/uL    Monocytes # 0.6 0.2 - 0.8 K/uL    Eosinophils # 0.2 0.0 - 0.4 K/uL    Basophils # 0.1 0.0 - 0.1 K/uL   Comprehensive Metabolic Panel w/ Reflex to MG   Result Value Ref Range    Sodium 136 136 - 145 mmol/L    Potassium reflex Magnesium 4.4 3.4 - 5.1 mmol/L    Chloride 100 98 - 107 mmol/L    CO2 23 20 - 30 mmol/L    Anion Gap 13 3 - 16    Glucose 140 (H) 74 - 106 mg/dL    BUN 12 6 - 20 mg/dL    CREATININE 0.7 0.4 - 1.2 mg/dL    GFR Non-African American >59 >59    GFR African American >59 >59    Calcium 9.1 8.5 - 10.5 mg/dL    Total Protein 6.6 6.4 - 8.3 g/dL    Alb 4.0 3.4 - 4.8 g/dL    Albumin/Globulin Ratio 1.5 0.8 - 2.0    Total Bilirubin <0.2 (L) 0.3 - 1.2 mg/dL    Alkaline Phosphatase 188 (H) 25 - 100 U/L    ALT 12 4 - 36 U/L    AST 21 8 - 33 U/L    Globulin 2.6 g/dL   POCT Glucose   Result Value Ref Range    POC Glucose 179 (H) 74 - 106 mg/dl    Performed on ACCU-CHEK         All other labs were within normal range or not returned as of this dictation. EMERGENCY DEPARTMENT COURSE and DIFFERENTIAL DIAGNOSIS/MDM:   Vitals:    Vitals:    06/15/19 1400 06/15/19 1415 06/15/19 1430 06/15/19 1445   BP: 122/67 111/65 96/60 (!) 97/59   Pulse: 102 104 99    Resp:       Temp:       TempSrc:       SpO2: 98% 98% 98%    Weight:           MEDICATIONS ADMINISTERED IN ED:  Medications   sulfamethoxazole-trimethoprim (BACTRIM DS;SEPTRA DS) 800-160 MG per tablet 2 tablet (2 tablets Oral Given 6/15/19 1439)   cephALEXin (KEFLEX) capsule 500 mg (500 mg Oral Given 6/15/19 1438)       Patient with mild cellulitic change to the right patellar region. errors in grammar, punctuation, and spelling, as well as words and phrases that may be inappropriate. If there are any questions or concerns please feel free to contact the dictating providerfor clarification.     Kiran Arriaza DO  Attending Emergency Physician              Kiran Arriaza DO  06/15/19 1053

## 2019-06-21 ENCOUNTER — TELEPHONE (OUTPATIENT)
Dept: INTERNAL MEDICINE | Facility: CLINIC | Age: 54
End: 2019-06-21

## 2019-06-21 ENCOUNTER — HOSPITAL ENCOUNTER (EMERGENCY)
Facility: HOSPITAL | Age: 54
Discharge: HOME | End: 2019-06-21
Attending: EMERGENCY MEDICINE
Payer: MEDICARE

## 2019-06-21 VITALS
TEMPERATURE: 97.2 F | BODY MASS INDEX: 19.26 KG/M2 | HEART RATE: 113 BPM | SYSTOLIC BLOOD PRESSURE: 137 MMHG | HEIGHT: 69 IN | DIASTOLIC BLOOD PRESSURE: 77 MMHG | OXYGEN SATURATION: 98 % | WEIGHT: 130 LBS | RESPIRATION RATE: 18 BRPM

## 2019-06-21 DIAGNOSIS — E16.2 HYPOGLYCEMIA: Primary | ICD-10-CM

## 2019-06-21 LAB
A/G RATIO: 1.5 (ref 0.8–2)
ALBUMIN SERPL-MCNC: 4.3 G/DL (ref 3.4–4.8)
ALP BLD-CCNC: 179 U/L (ref 25–100)
ALT SERPL-CCNC: 12 U/L (ref 4–36)
ANION GAP SERPL CALCULATED.3IONS-SCNC: 12 MMOL/L (ref 3–16)
AST SERPL-CCNC: 21 U/L (ref 8–33)
BASOPHILS ABSOLUTE: 0 K/UL (ref 0–0.1)
BASOPHILS RELATIVE PERCENT: 0.4 %
BILIRUB SERPL-MCNC: <0.2 MG/DL (ref 0.3–1.2)
BUN BLDV-MCNC: 11 MG/DL (ref 6–20)
CALCIUM SERPL-MCNC: 9.1 MG/DL (ref 8.5–10.5)
CHLORIDE BLD-SCNC: 99 MMOL/L (ref 98–107)
CHP ED QC CHECK: NORMAL
CHP ED QC CHECK: NORMAL
CO2: 26 MMOL/L (ref 20–30)
CREAT SERPL-MCNC: 0.8 MG/DL (ref 0.4–1.2)
EOSINOPHILS ABSOLUTE: 0.1 K/UL (ref 0–0.4)
EOSINOPHILS RELATIVE PERCENT: 0.8 %
GFR AFRICAN AMERICAN: >59
GFR NON-AFRICAN AMERICAN: >59
GLOBULIN: 2.9 G/DL
GLUCOSE BLD-MCNC: 113 MG/DL
GLUCOSE BLD-MCNC: 154 MG/DL
GLUCOSE BLD-MCNC: 154 MG/DL (ref 74–106)
GLUCOSE BLD-MCNC: 303 MG/DL (ref 74–106)
GLUCOSE BLD-MCNC: 318 MG/DL (ref 74–106)
HCT VFR BLD CALC: 40.8 % (ref 40–54)
HEMOGLOBIN: 11.5 G/DL (ref 13–18)
IMMATURE GRANULOCYTES #: 0 K/UL
IMMATURE GRANULOCYTES %: 0.3 % (ref 0–5)
LYMPHOCYTES ABSOLUTE: 1.3 K/UL (ref 1.5–4)
LYMPHOCYTES RELATIVE PERCENT: 13.5 %
MCH RBC QN AUTO: 20.9 PG (ref 27–32)
MCHC RBC AUTO-ENTMCNC: 28.2 G/DL (ref 31–35)
MCV RBC AUTO: 74.3 FL (ref 80–100)
MONOCYTES ABSOLUTE: 0.5 K/UL (ref 0.2–0.8)
MONOCYTES RELATIVE PERCENT: 4.8 %
NEUTROPHILS ABSOLUTE: 7.6 K/UL (ref 2–7.5)
NEUTROPHILS RELATIVE PERCENT: 80.2 %
PDW BLD-RTO: 29.7 % (ref 11–16)
PERFORMED ON: ABNORMAL
PERFORMED ON: ABNORMAL
PLATELET # BLD: 371 K/UL (ref 150–400)
PMV BLD AUTO: 9.8 FL (ref 6–10)
POTASSIUM REFLEX MAGNESIUM: 4.6 MMOL/L (ref 3.4–5.1)
RBC # BLD: 5.49 M/UL (ref 4.5–6)
SODIUM BLD-SCNC: 137 MMOL/L (ref 136–145)
TOTAL PROTEIN: 7.2 G/DL (ref 6.4–8.3)
WBC # BLD: 9.5 K/UL (ref 4–11)

## 2019-06-21 PROCEDURE — 99284 EMERGENCY DEPT VISIT MOD MDM: CPT

## 2019-06-21 PROCEDURE — 36415 COLL VENOUS BLD VENIPUNCTURE: CPT

## 2019-06-21 PROCEDURE — 85025 COMPLETE CBC W/AUTO DIFF WBC: CPT

## 2019-06-21 PROCEDURE — 80053 COMPREHEN METABOLIC PANEL: CPT

## 2019-06-21 ASSESSMENT — PAIN SCALES - GENERAL: PAINLEVEL_OUTOF10: 10

## 2019-06-21 NOTE — ED PROVIDER NOTES
Medical History:   Diagnosis Date    Cancer [facility])     breast left    Cancer ([address]     stomach    Gastroparesis 2013    GERD (gastroesophageal reflux disease)     Hypertension     Neuropathy     Prostate disease     Type II or unspecified type diabetes mellitus without mention of complication, not stated as uncontrolled          SURGICAL HISTORY       Past Surgical History:   Procedure Laterality Date    BREAST SURGERY      CHOLECYSTECTOMY      FEMORAL-FEMORAL BYPASS GRAFT      FINGER TRIGGER RELEASE      GASTROSTOMY TUBE PLACEMENT  2013    HERNIA REPAIR      x2    LEG AMPUTATION BELOW KNEE Right     SMALL INTESTINE SURGERY           CURRENT MEDICATIONS       Previous Medications    B-D INS SYR ULTRAFINE .5CC/30G 30G X 1/2\" 0.5 ML MISC        CEPHALEXIN (KEFLEX) 500 MG CAPSULE    Take 1 capsule by mouth 4 times daily for 7 days    EASY TOUCH INSULIN SYRINGE 31G X 5/16\" 0.5 ML MISC    USE TO INJECT INSULIN 3 TIMES A DAY AS DIRECTED    GABAPENTIN (NEURONTIN) 800 MG TABLET    Take 800 mg by mouth 4 times daily     INSULIN ASPART (NOVOLOG SC)    Inject into the skin 4 times daily Sliding scale    INSULIN NPH HUMAN, ISOPHANE, (NOVOLIN N SC)    Inject 14 Units into the skin 2 times daily     INSULIN SYRINGES, DISPOSABLE, U-100 0.5 ML MISC    Tid  Dx: 250.00    OXYCODONE-ACETAMINOPHEN (PERCOCET)  MG PER TABLET    Take 1 tablet by mouth every 4 hours as needed for Pain. SULFAMETHOXAZOLE-TRIMETHOPRIM (BACTRIM DS) 800-160 MG PER TABLET    Take 2 tablets by mouth 2 times daily for 7 days    ZOLPIDEM (AMBIEN) 10 MG TABLET    Take by mouth nightly as needed for Sleep.        ALLERGIES     Phenergan [promethazine hcl]    FAMILY HISTORY       Family History   Problem Relation Age of Onset    Diabetes Mother     Cancer Father         leukemia          SOCIAL HISTORY       Social History     Socioeconomic History    Marital status:      Spouse name: None    Number of children: None    Years of education: None    Highest education level: None   Occupational History    None   Social Needs    Financial resource strain: None    Food insecurity:     Worry: None     Inability: None    Transportation needs:     Medical: None     Non-medical: None   Tobacco Use    Smoking status: Current Every Day Smoker     Packs/day: 0.50     Years: 30.00     Pack years: 15.00     Types: Cigarettes    Smokeless tobacco: Never Used   Substance and Sexual Activity    Alcohol use: No    Drug use: No    Sexual activity: Not Currently   Lifestyle    Physical activity:     Days per week: None     Minutes per session: None    Stress: None   Relationships    Social connections:     Talks on phone: None     Gets together: None     Attends Faith service: None     Active member of club or organization: None     Attends meetings of clubs or organizations: None     Relationship status: None    Intimate partner violence:     Fear of current or ex partner: None     Emotionally abused: None     Physically abused: None     Forced sexual activity: None   Other Topics Concern    None   Social History Narrative    None         PHYSICAL EXAM    (up to 7 for level 4, 8 or more for level 5)     ED Triage Vitals [06/21/19 1305]   BP Temp Temp src Pulse Resp SpO2 Height Weight   -- -- -- -- -- -- 5' 9\" (1.753 m) 130 lb (59 kg)       Physical Exam  General :Patient is awake, alert, oriented, in no acute distress, nontoxic appearing, GCS 15  HEENT: Pupils are equally round and reactive to light, EOMI, conjunctivae clear. Oral mucosa is moist, no exudate. Uvula is midline  Neck: Neck is supple, full range of motion, trachea midline  Cardiac: Heart regular rate, rhythm, no murmurs, rubs, or gallops  Lungs: Lungs are clear to auscultation, there is no wheezing, rhonchi, or rales. There is no use of accessory muscles. Abdomen: Abdomen is soft, nontender, nondistended.   There are postsurgical changes with generalized umbilical hernia noted.  Abdomen is soft. There is no firm or pulsatile masses, no rebound rigidity or guarding. Musculoskeletal: Right below-knee amputation, there is a small area of cellulitis on the right medial aspect of the knee without any open wounds or abscesses. 5 out of 5 strength in all 4 extremities. No focal muscle deficits are appreciated  Neuro: Motor intact, sensory intact, level of consciousness is normal  Dermatology: Skin is warm and dry  Psych: Mentation is grossly normal, cognition is grossly normal. Affect is appropriate.       DIAGNOSTIC RESULTS     EKG: All EKG's are interpreted by the Emergency Department Physician who either signs or Co-signs this chart in the [address] a cardiologist.        RADIOLOGY:   Non-plain film images such as CT, Ultrasound and MRI are read by the radiologist. Plain radiographic images are visualized and preliminarily interpreted by the emergency physician with the below findings:      ? Radiologist's Report Reviewed:  No orders to display         ED BEDSIDE ULTRASOUND:   Performed by ED Physician - none    LABS:    I have reviewed and interpreted all of the currently available lab results from this visit (ifapplicable):  Results for orders placed or performed during the hospital encounter of 06/21/19   CBC Auto Differential   Result Value Ref Range    WBC 9.5 4.0 - 11.0 K/uL    RBC 5.49 4.50 - 6.00 M/uL    Hemoglobin 11.5 (L) 13.0 - 18.0 g/dL    Hematocrit 40.8 40.0 - 54.0 %    MCV 74.3 (L) 80.0 - 100.0 fL    MCH 20.9 (L) 27.0 - 32.0 pg    MCHC 28.2 (L) 31.0 - 35.0 g/dL    RDW 29.7 (H) 11.0 - 16.0 %    Platelets 970 170 - 826 K/uL    MPV 9.8 6.0 - 10.0 fL    Neutrophils % 80.2 %    Immature Granulocytes % 0.3 0.0 - 5.0 %    Lymphocytes % 13.5 %    Monocytes % 4.8 %    Eosinophils % 0.8 %    Basophils % 0.4 %    Neutrophils # 7.6 (H) 2.0 - 7.5 K/uL    Immature Granulocytes # 0.0 K/uL    Lymphocytes # 1.3 (L) 1.5 - 4.0 K/uL    Monocytes # 0.5 0.2 - 0.8 K/uL    Eosinophils # 0.1 0.0 - 0.4 K/uL    Basophils # 0.0 0.0 - 0.1 K/uL   Comprehensive Metabolic Panel w/ Reflex to MG   Result Value Ref Range    Sodium 137 136 - 145 mmol/L    Potassium reflex Magnesium 4.6 3.4 - 5.1 mmol/L    Chloride 99 98 - 107 mmol/L    CO2 26 20 - 30 mmol/L    Anion Gap 12 3 - 16    Glucose 303 (H) 74 - 106 mg/dL    BUN 11 6 - 20 mg/dL    CREATININE 0.8 0.4 - 1.2 mg/dL    GFR Non-African American >59 >59    GFR African American >59 >59    Calcium 9.1 8.5 - 10.5 mg/dL    Total Protein 7.2 6.4 - 8.3 g/dL    Alb 4.3 3.4 - 4.8 g/dL    Albumin/Globulin Ratio 1.5 0.8 - 2.0    Total Bilirubin <0.2 (L) 0.3 - 1.2 mg/dL    Alkaline Phosphatase 179 (H) 25 - 100 U/L    ALT 12 4 - 36 U/L    AST 21 8 - 33 U/L    Globulin 2.9 g/dL   POCT Glucose   Result Value Ref Range    POC Glucose 154 (H) 74 - 106 mg/dl    Performed on ACCU-CHEK    POCT Glucose   Result Value Ref Range    Glucose 154 mg/dL    QC OK? ok    POCT Glucose   Result Value Ref Range    Glucose 113 mg/dL    QC OK? ok    POCT Glucose   Result Value Ref Range    POC Glucose 318 (H) 74 - 106 mg/dl    Performed on ACCU-CHEK         All other labs were within normal range or not returned as of this dictation. EMERGENCY DEPARTMENT COURSE and DIFFERENTIAL DIAGNOSIS/MDM:   Vitals:    Vitals:    06/21/19 1305 06/21/19 1309   BP:  137/77   Pulse:  113   Resp:  18   Temp:  97.2 °F (36.2 °C)   SpO2:  98%   Weight: 130 lb (59 kg)    Height: 5' 9\" (1.753 m)        MEDICATIONS ADMINISTERED IN ED:  Medications - No data to display    Patient with recurrent hypoglycemia with a history of insulin dependent diabetes. Patient is alert and oriented, did receive glucagon IM, also oral glucose prior to arrival per EMS. On arrival the patient's blood sugars are 159, up from a reading which EMS states was too low. Patient has been seen multiple times with recurrent hypoglycemia, in the single digits, likely has been ongoing for some time.   Patient will likely need medication insulin adjustments due to his recurrent hypoglycemia, we will attempt to reach out to his endocrinologist for discussion. Dr Andressa Rodriguez. Unable to reach the patient's endocrinologist, they will send her a note to there on call system. I feel the patient would benefit from a change to a sliding scale regimen, half dose correction in the meantime until he can follow-up with his endocrinologist as soon as possible. Patient is agreeable to this plan of care. Recheck blood sugar 300. The patient will follow-up with their PCP in 1-2 days for reevaluation. If the patient or family members have anyfurther concerns or any worsening symptoms they will return to the ED for reevaluation. CONSULTS:  None    PROCEDURES:  Procedures    CRITICAL CARE TIME    Total Critical Care time was 0 minutes, excluding separately reportable procedures. There was a high probability of clinically significant/life threatening deterioration in the patient's condition which required my urgent intervention. FINAL IMPRESSION      1. Hypoglycemia          DISPOSITION/PLAN   DISPOSITION        PATIENT REFERRED TO:  Bushra Izquierdo MD  Endocrinology  [phone]    [address] [place] [address] Internal Medicine and Endocrinology  [address] [place] [address]  Schedule an appointment as soon as possible for a visit       OXANA Heredia CNP  [address]  [phone]    In 2 days      [facility]  [facility] [address] [facility]  [phone]    If symptoms worsen      DISCHARGE MEDICATIONS:  New Prescriptions    No medications on file       Comment: Please note this report has been produced using speech recognition software and may contain errorsrelated to that system including errors in grammar, punctuation, and spelling, as well as words and phrases that may be inappropriate.  If there are any questions or concerns please feel free to contact the dictating providerfor clarification.     Tia Hill DO  Attending Emergency Physician              Tia Hill DO  06/21/19 5386

## 2019-06-21 NOTE — TELEPHONE ENCOUNTER
DR BOWEN WOULD LIKE TO SPEAK TO YOU ABOUT PATIENT ORIANA FREEMAN. PLEASE CONTACT DR BOWEN @ [phone]

## 2019-06-21 NOTE — ED NOTES
Patient didn't want an IV, educated that he would have a second stick if we needed it later. Verbalized understanding.   Notified MD.     Eliane Manley RN  06/21/19 6108

## 2019-06-21 NOTE — ED NOTES
Called Dr. Rex Blankenship per Dr. Michelle Du request. She will return his call shortly.      Hans Ruff  06/21/19 9425

## 2019-06-21 NOTE — TELEPHONE ENCOUNTER
PLEASE CALL DR TROY'S OFFICE [phone]  THEY WERE TOLD YOU WERE OUT OF THE OFFICE UNTIL Wednesday

## 2019-06-22 ENCOUNTER — TELEPHONE (OUTPATIENT)
Dept: ENDOCRINOLOGY | Facility: CLINIC | Age: 54
End: 2019-06-22

## 2019-06-22 NOTE — TELEPHONE ENCOUNTER
I was never called directly by my office staff a out this ER call. When I called on Saturday, the ER physician and the patient were obviously not there.  Shana Monahan MD

## 2019-06-22 NOTE — TELEPHONE ENCOUNTER
Tried to call pt multiple times. No voicemail set up to leave a message. Was able to find out that patient has been having hypoglycemia. Presented to local ER with significant hypoglycemia,  and was instructed to hold his NPH and call office to make an earlier appt.  Shana Monahan MD

## 2019-07-11 ENCOUNTER — HOSPITAL ENCOUNTER (EMERGENCY)
Facility: HOSPITAL | Age: 54
Discharge: LEFT BEFORE DISCHARGE | End: 2019-07-11
Attending: HOSPITALIST
Payer: MEDICARE

## 2019-07-11 VITALS
RESPIRATION RATE: 16 BRPM | SYSTOLIC BLOOD PRESSURE: 136 MMHG | TEMPERATURE: 98.3 F | BODY MASS INDEX: 19.26 KG/M2 | WEIGHT: 130 LBS | HEART RATE: 112 BPM | DIASTOLIC BLOOD PRESSURE: 79 MMHG | HEIGHT: 69 IN | OXYGEN SATURATION: 99 %

## 2019-07-11 DIAGNOSIS — E16.2 HYPOGLYCEMIA: Primary | ICD-10-CM

## 2019-07-11 LAB
GLUCOSE BLD-MCNC: 25 MG/DL (ref 74–106)
GLUCOSE BLD-MCNC: 73 MG/DL (ref 74–106)
PERFORMED ON: ABNORMAL
PERFORMED ON: ABNORMAL

## 2019-07-11 PROCEDURE — 6370000000 HC RX 637 (ALT 250 FOR IP): Performed by: HOSPITALIST

## 2019-07-11 PROCEDURE — 99282 EMERGENCY DEPT VISIT SF MDM: CPT

## 2019-07-11 PROCEDURE — 96374 THER/PROPH/DIAG INJ IV PUSH: CPT

## 2019-07-11 PROCEDURE — 2500000003 HC RX 250 WO HCPCS: Performed by: HOSPITALIST

## 2019-07-11 RX ORDER — NICOTINE POLACRILEX 4 MG
15 LOZENGE BUCCAL ONCE
Status: COMPLETED | OUTPATIENT
Start: 2019-07-11 | End: 2019-07-11

## 2019-07-11 RX ORDER — HYDROCODONE BITARTRATE AND ACETAMINOPHEN 7.5; 325 MG/1; MG/1
1 TABLET ORAL EVERY 6 HOURS PRN
COMMUNITY

## 2019-07-11 RX ORDER — NICOTINE POLACRILEX 4 MG
LOZENGE BUCCAL
Status: DISCONTINUED
Start: 2019-07-11 | End: 2019-07-11 | Stop reason: HOSPADM

## 2019-07-11 RX ADMIN — GLUCAGON HYDROCHLORIDE 1 MG: KIT at 13:45

## 2019-07-11 RX ADMIN — DEXTROSE 15 G: 15 GEL ORAL at 13:44

## 2019-07-11 ASSESSMENT — PAIN SCALES - GENERAL: PAINLEVEL_OUTOF10: 8

## 2019-07-11 ASSESSMENT — PAIN DESCRIPTION - LOCATION: LOCATION: LEG

## 2019-07-11 ASSESSMENT — PAIN DESCRIPTION - PAIN TYPE: TYPE: CHRONIC PAIN

## 2019-07-11 ASSESSMENT — PAIN DESCRIPTION - ORIENTATION: ORIENTATION: RIGHT;LEFT

## 2019-07-11 ASSESSMENT — PAIN DESCRIPTION - DESCRIPTORS: DESCRIPTORS: BURNING;THROBBING

## 2019-07-11 NOTE — ED PROVIDER NOTES
weakness  Cardiovascular:  No chest pain, no palpitations  Respiratory:  No shortness of breath, no cough, no wheezing  Gastrointestinal:  No pain, no nausea, no vomiting, no diarrhea  Musculoskeletal:  No muscle pain, no joint pain  Skin:  No rash, no easy bruising  Neurologic:  No speech problems, no headache, no extremity weakness, +AMS secondary to hypoglycemia  Psychiatric:  No anxiety  Genitourinary:  No dysuria, no hematuria    Except as noted above the remainder of the review of systems was reviewed and negative. PAST MEDICAL HISTORY     Past Medical History:   Diagnosis Date    Cancer [facility])     breast left    Cancer ([facility] [address]     stomach    Gastroparesis 2013    GERD (gastroesophageal reflux disease)     Hypertension     Neuropathy     Prostate disease     Type II or unspecified type diabetes mellitus without mention of complication, not stated as uncontrolled          SURGICAL HISTORY       Past Surgical History:   Procedure Laterality Date    BREAST SURGERY      CHOLECYSTECTOMY      FEMORAL-FEMORAL BYPASS GRAFT      FINGER TRIGGER RELEASE      GASTROSTOMY TUBE PLACEMENT  2013    HERNIA REPAIR      x2    LEG AMPUTATION BELOW KNEE Right     SMALL INTESTINE SURGERY           CURRENT MEDICATIONS       Previous Medications    B-D INS SYR ULTRAFINE .5CC/30G 30G X 1/2\" 0.5 ML MISC        EASY TOUCH INSULIN SYRINGE 31G X 5/16\" 0.5 ML MISC    USE TO INJECT INSULIN 3 TIMES A DAY AS DIRECTED    GABAPENTIN (NEURONTIN) 800 MG TABLET    Take 800 mg by mouth 4 times daily     INSULIN ASPART (NOVOLOG SC)    Inject into the skin 4 times daily Sliding scale    INSULIN NPH HUMAN, ISOPHANE, (NOVOLIN N SC)    Inject 14 Units into the skin 2 times daily     INSULIN SYRINGES, DISPOSABLE, U-100 0.5 ML MISC    Tid  Dx: 250.00    OXYCODONE-ACETAMINOPHEN (PERCOCET)  MG PER TABLET    Take 1 tablet by mouth every 4 hours as needed for Pain.     ZOLPIDEM (AMBIEN) 10 MG TABLET    Take by mouth nightly as needed

## 2019-07-11 NOTE — ED TRIAGE NOTES
Pt arrives with [place] [facility] with hypoglycemia. They were unable to obtain iv access on scene or en route. Pt was given oral glucose by ems for a bg of 38    Upon pt arrival his eyes were open but he was ot responding. His poc was 25.  At 1341    md was advised and plan for oral glucose and im glucagon    em

## 2019-07-15 ENCOUNTER — APPOINTMENT (OUTPATIENT)
Dept: CT IMAGING | Facility: HOSPITAL | Age: 54
End: 2019-07-15
Payer: MEDICARE

## 2019-07-15 ENCOUNTER — HOSPITAL ENCOUNTER (EMERGENCY)
Facility: HOSPITAL | Age: 54
Discharge: HOME | End: 2019-07-15
Attending: HOSPITALIST
Payer: MEDICARE

## 2019-07-15 VITALS
BODY MASS INDEX: 19.33 KG/M2 | HEART RATE: 98 BPM | DIASTOLIC BLOOD PRESSURE: 84 MMHG | OXYGEN SATURATION: 99 % | TEMPERATURE: 97 F | SYSTOLIC BLOOD PRESSURE: 157 MMHG | WEIGHT: 135 LBS | HEIGHT: 70 IN | RESPIRATION RATE: 22 BRPM

## 2019-07-15 DIAGNOSIS — E16.2 HYPOGLYCEMIA: Primary | ICD-10-CM

## 2019-07-15 DIAGNOSIS — R40.0 SOMNOLENCE: ICD-10-CM

## 2019-07-15 DIAGNOSIS — T40.2X1A OPIOID OVERDOSE, ACCIDENTAL OR UNINTENTIONAL, INITIAL ENCOUNTER (HCC): ICD-10-CM

## 2019-07-15 LAB
AMPHETAMINE SCREEN, URINE: ABNORMAL
ANION GAP SERPL CALCULATED.3IONS-SCNC: 11 MMOL/L (ref 3–16)
BARBITURATE SCREEN URINE: ABNORMAL
BASE EXCESS VENOUS: 1 MMOL/L (ref -3–3)
BASOPHILS ABSOLUTE: 0.1 K/UL (ref 0–0.1)
BASOPHILS RELATIVE PERCENT: 0.6 %
BENZODIAZEPINE SCREEN, URINE: ABNORMAL
BILIRUBIN URINE: NEGATIVE
BLOOD, URINE: ABNORMAL
BUN BLDV-MCNC: 12 MG/DL (ref 6–20)
CALCIUM SERPL-MCNC: 9.3 MG/DL (ref 8.5–10.5)
CANNABINOID SCREEN URINE: ABNORMAL
CHLORIDE BLD-SCNC: 103 MMOL/L (ref 98–107)
CHP ED QC CHECK: NORMAL
CLARITY: CLEAR
CO2: 26 MMOL/L (ref 20–30)
COCAINE METABOLITE SCREEN URINE: ABNORMAL
COLOR: YELLOW
CREAT SERPL-MCNC: 0.8 MG/DL (ref 0.4–1.2)
EOSINOPHILS ABSOLUTE: 0.2 K/UL (ref 0–0.4)
EOSINOPHILS RELATIVE PERCENT: 1.4 %
FIO2: 0.2 %
GFR AFRICAN AMERICAN: >59
GFR NON-AFRICAN AMERICAN: >59
GLUCOSE BLD-MCNC: 146 MG/DL (ref 74–106)
GLUCOSE BLD-MCNC: 153 MG/DL
GLUCOSE BLD-MCNC: 153 MG/DL (ref 74–106)
GLUCOSE BLD-MCNC: 156 MG/DL
GLUCOSE BLD-MCNC: 156 MG/DL (ref 74–106)
GLUCOSE BLD-MCNC: 358 MG/DL (ref 74–106)
GLUCOSE BLD-MCNC: 36 MG/DL
GLUCOSE BLD-MCNC: 36 MG/DL (ref 74–106)
GLUCOSE BLD-MCNC: 38 MG/DL (ref 74–106)
GLUCOSE BLD-MCNC: 68 MG/DL
GLUCOSE BLD-MCNC: 68 MG/DL (ref 74–106)
GLUCOSE BLD-MCNC: 84 MG/DL (ref 74–106)
GLUCOSE URINE: NEGATIVE MG/DL
HCO3 VENOUS: 25.9 MMOL/L (ref 23–29)
HCT VFR BLD CALC: 40.4 % (ref 40–54)
HEMOGLOBIN: 12 G/DL (ref 13–18)
IMMATURE GRANULOCYTES #: 0 K/UL
IMMATURE GRANULOCYTES %: 0.2 % (ref 0–5)
KETONES, URINE: NEGATIVE MG/DL
LACTIC ACID: 1.8 MMOL/L (ref 0.4–2)
LEUKOCYTE ESTERASE, URINE: NEGATIVE
LYMPHOCYTES ABSOLUTE: 2.8 K/UL (ref 1.5–4)
LYMPHOCYTES RELATIVE PERCENT: 24.7 %
Lab: ABNORMAL
MCH RBC QN AUTO: 22.7 PG (ref 27–32)
MCHC RBC AUTO-ENTMCNC: 29.7 G/DL (ref 31–35)
MCV RBC AUTO: 76.4 FL (ref 80–100)
METHADONE SCREEN, URINE: ABNORMAL
METHAMPHETAMINE, URINE: ABNORMAL
MICROSCOPIC EXAMINATION: YES
MONOCYTES ABSOLUTE: 1 K/UL (ref 0.2–0.8)
MONOCYTES RELATIVE PERCENT: 8.4 %
NEUTROPHILS ABSOLUTE: 7.3 K/UL (ref 2–7.5)
NEUTROPHILS RELATIVE PERCENT: 64.7 %
NITRITE, URINE: NEGATIVE
O2 SAT, VEN: 99 %
O2 THERAPY: ABNORMAL
OPIATE SCREEN URINE: POSITIVE
PCO2, VEN: 42.3 MMHG (ref 40–50)
PERFORMED ON: ABNORMAL
PH UA: 6 (ref 5–8)
PH VENOUS: 7.41 (ref 7.35–7.45)
PHENCYCLIDINE SCREEN URINE: ABNORMAL
PLATELET # BLD: 260 K/UL (ref 150–400)
PMV BLD AUTO: 9.5 FL (ref 6–10)
PO2, VEN: 148.9 MMHG (ref 25–40)
POTASSIUM REFLEX MAGNESIUM: 4 MMOL/L (ref 3.4–5.1)
PROPOXYPHENE SCREEN, URINE: ABNORMAL
PROTEIN UA: NEGATIVE MG/DL
RBC # BLD: 5.29 M/UL (ref 4.5–6)
RBC UA: NORMAL /HPF (ref 0–2)
SODIUM BLD-SCNC: 140 MMOL/L (ref 136–145)
SPECIFIC GRAVITY UA: 1.01 (ref 1–1.03)
TCO2 CALC VENOUS: 27 MMOL/L
TRICYCLIC, URINE: POSITIVE
TROPONIN: <0.3 NG/ML
UR OXYCODONE RAPID SCREEN: ABNORMAL
URINE REFLEX TO CULTURE: ABNORMAL
URINE TYPE: ABNORMAL
UROBILINOGEN, URINE: 0.2 E.U./DL
WBC # BLD: 11.3 K/UL (ref 4–11)
WBC UA: NORMAL /HPF (ref 0–5)

## 2019-07-15 PROCEDURE — 96374 THER/PROPH/DIAG INJ IV PUSH: CPT

## 2019-07-15 PROCEDURE — 80048 BASIC METABOLIC PNL TOTAL CA: CPT

## 2019-07-15 PROCEDURE — 80307 DRUG TEST PRSMV CHEM ANLYZR: CPT

## 2019-07-15 PROCEDURE — 6370000000 HC RX 637 (ALT 250 FOR IP): Performed by: HOSPITALIST

## 2019-07-15 PROCEDURE — 81001 URINALYSIS AUTO W/SCOPE: CPT

## 2019-07-15 PROCEDURE — 84484 ASSAY OF TROPONIN QUANT: CPT

## 2019-07-15 PROCEDURE — 96376 TX/PRO/DX INJ SAME DRUG ADON: CPT

## 2019-07-15 PROCEDURE — 83605 ASSAY OF LACTIC ACID: CPT

## 2019-07-15 PROCEDURE — 85025 COMPLETE CBC W/AUTO DIFF WBC: CPT

## 2019-07-15 PROCEDURE — 93005 ELECTROCARDIOGRAM TRACING: CPT

## 2019-07-15 PROCEDURE — 70450 CT HEAD/BRAIN W/O DYE: CPT

## 2019-07-15 PROCEDURE — 99284 EMERGENCY DEPT VISIT MOD MDM: CPT

## 2019-07-15 PROCEDURE — 36415 COLL VENOUS BLD VENIPUNCTURE: CPT

## 2019-07-15 PROCEDURE — 82803 BLOOD GASES ANY COMBINATION: CPT

## 2019-07-15 PROCEDURE — 2580000003 HC RX 258: Performed by: HOSPITALIST

## 2019-07-15 PROCEDURE — 2500000003 HC RX 250 WO HCPCS

## 2019-07-15 PROCEDURE — 6360000002 HC RX W HCPCS: Performed by: HOSPITALIST

## 2019-07-15 RX ORDER — DEXTROSE MONOHYDRATE 25 G/50ML
50 INJECTION, SOLUTION INTRAVENOUS ONCE
Status: COMPLETED | OUTPATIENT
Start: 2019-07-15 | End: 2019-07-15

## 2019-07-15 RX ORDER — NALOXONE HYDROCHLORIDE 1 MG/ML
2 INJECTION INTRAMUSCULAR; INTRAVENOUS; SUBCUTANEOUS ONCE
Status: COMPLETED | OUTPATIENT
Start: 2019-07-15 | End: 2019-07-15

## 2019-07-15 RX ORDER — NICOTINE POLACRILEX 4 MG
15 LOZENGE BUCCAL ONCE
Status: COMPLETED | OUTPATIENT
Start: 2019-07-15 | End: 2019-07-15

## 2019-07-15 RX ORDER — NALOXONE HYDROCHLORIDE 1 MG/ML
1 INJECTION INTRAMUSCULAR; INTRAVENOUS; SUBCUTANEOUS ONCE
Status: COMPLETED | OUTPATIENT
Start: 2019-07-15 | End: 2019-07-15

## 2019-07-15 RX ORDER — NICOTINE POLACRILEX 4 MG
LOZENGE BUCCAL
Status: DISCONTINUED
Start: 2019-07-15 | End: 2019-07-15 | Stop reason: HOSPADM

## 2019-07-15 RX ADMIN — NALOXONE HYDROCHLORIDE 2 MG: 1 INJECTION PARENTERAL at 10:13

## 2019-07-15 RX ADMIN — GLUCAGON HYDROCHLORIDE 1 MG: KIT at 13:10

## 2019-07-15 RX ADMIN — NALOXONE HYDROCHLORIDE 2 MG: 1 INJECTION PARENTERAL at 08:40

## 2019-07-15 RX ADMIN — DEXTROSE MONOHYDRATE 50 ML: 25 INJECTION, SOLUTION INTRAVENOUS at 08:16

## 2019-07-15 RX ADMIN — DEXTROSE 15 G: 15 GEL ORAL at 13:15

## 2019-07-15 RX ADMIN — NALOXONE HYDROCHLORIDE 1 MG: 1 INJECTION PARENTERAL at 08:14

## 2019-07-15 NOTE — ED PROVIDER NOTES
are grossly normal.   Dermatology: Skin is warm and dry  Psych: Mentation is grossly normal, cognition is grossly normal. Affect is appropriate. DIAGNOSTIC RESULTS     EKG: All EKG's are interpreted by the Emergency Department Physician who either signs or Co-signs this chart in the absenceof a cardiologist.    The EKG interpreted by me shows normal sinus rhythm. Ventricular rate is 93 bpm, TN interval is 140 ms, QRS duration is 94 ms, QT/QTc is 336/417 ms. No acute T-wave inversions concerning for acute myocardial ischemia. No ST elevations concerning for acute myocardial infarction.     RADIOLOGY:   Non-plain film images such as CT, Ultrasound and MRI are read by the radiologist. Plain radiographic images are visualized and preliminarily interpreted by the emergency physician with the below findings:      ? Radiologist's Report Reviewed:  CT Head WO Contrast   Final Result      No acute hemorrhage      Motion artifact limits exam.            ED BEDSIDE ULTRASOUND:   Performed by ED Physician - none    LABS:    I have reviewed and interpreted all of the currently available lab results from this visit (ifapplicable):  Results for orders placed or performed during the hospital encounter of 07/15/19   CBC Auto Differential   Result Value Ref Range    WBC 11.3 (H) 4.0 - 11.0 K/uL    RBC 5.29 4.50 - 6.00 M/uL    Hemoglobin 12.0 (L) 13.0 - 18.0 g/dL    Hematocrit 40.4 40.0 - 54.0 %    MCV 76.4 (L) 80.0 - 100.0 fL    MCH 22.7 (L) 27.0 - 32.0 pg    MCHC 29.7 (L) 31.0 - 35.0 g/dL    Platelets 136 884 - 727 K/uL    MPV 9.5 6.0 - 10.0 fL    Neutrophils % 64.7 %    Immature Granulocytes % 0.2 0.0 - 5.0 %    Lymphocytes % 24.7 %    Monocytes % 8.4 %    Eosinophils % 1.4 %    Basophils % 0.6 %    Neutrophils # 7.3 2.0 - 7.5 K/uL    Immature Granulocytes # 0.0 K/uL    Lymphocytes # 2.8 1.5 - 4.0 K/uL    Monocytes # 1.0 (H) 0.2 - 0.8 K/uL    Eosinophils # 0.2 0.0 - 0.4 K/uL    Basophils # 0.1 0.0 - 0.1 K/uL   Basic Metabolic Panel w/ Reflex to MG   Result Value Ref Range    Sodium 140 136 - 145 mmol/L    Potassium reflex Magnesium 4.0 3.4 - 5.1 mmol/L    Chloride 103 98 - 107 mmol/L    CO2 26 20 - 30 mmol/L    Anion Gap 11 3 - 16    Glucose 84 74 - 106 mg/dL    BUN 12 6 - 20 mg/dL    CREATININE 0.8 0.4 - 1.2 mg/dL    GFR Non-African American >59 >59    GFR African American >59 >59    Calcium 9.3 8.5 - 10.5 mg/dL   Urinalysis Reflex to Culture   Result Value Ref Range    Color, UA Yellow Straw/Yellow    Clarity, UA Clear Clear    Glucose, Ur Negative Negative mg/dL    Bilirubin Urine Negative Negative    Ketones, Urine Negative Negative mg/dL    Specific Gravity, UA 1.010 1.005 - 1.030    Blood, Urine TRACE-INTACT (A) Negative    pH, UA 6.0 5.0 - 8.0    Protein, UA Negative Negative mg/dL    Urobilinogen, Urine 0.2 <2.0 E.U./dL    Nitrite, Urine Negative Negative    Leukocyte Esterase, Urine Negative Negative    Microscopic Examination YES     Urine Reflex to Culture Not Indicated     Urine Type Catheter    Drug Screen, Multiple, Urine   Result Value Ref Range    PCP Screen, Urine Neg Negative <25 ng/mL    Benzodiazepine Screen, Urine Neg Negative <300 ng/mL    Cocaine Metabolite Screen, Urine Neg Negative <300 ng/mL    Amphetamine Screen, Urine Neg Negative <1000 ng/mL    Cannabinoid Scrn, Ur Neg Negative <50 ng/mL    Opiate Scrn, Ur POSITIVE (A) Negative <300 ng/mL    Barbiturate Screen, Ur Neg Negative <777 ng/mL    Tricyclic POSITIVE (A) Negative <300 ng/mL    Methadone Screen, Urine Neg Negative <300 ng/ml    Propoxyphene Screen, Urine Neg Negative <300 ng/mL    Methamphetamine, Urine Neg Negative <1000 ng/mL    UR Oxycodone Rapid Screen Neg Negative <100 ng/mL    Drug Screen Comment: see below    Lactic Acid, Plasma   Result Value Ref Range    Lactic Acid 1.8 0.4 - 2.0 mmol/L   Troponin   Result Value Ref Range    Troponin <0.30 <0.30 ng/mL   Blood Gas, Venous   Result Value Ref Range    pH, Parmjit 7.41 7.35 - 7.45    pCO2, Parmjit 42.3 Initially glucose recheck is now 153. CMP was performed on i-STAT liver function test we will post to the chart once his blood work is been performed secondary to laboratory downtime. I-STAT showed sodium 141, potassium 3.6, chloride 106, CO2 is 24, glucose is 77, BUN is 11, creatinine 0.7. Troponin was 0.00. Venous blood gas showed a pH 7.41, PCO2 is 42.3, PO2 was 148.9, bicarb was 25.9, base excess of 1.0. Lactic acid was 1.8. BNP was performed after laboratory downtime and is completely normal and benign. UA was negative, no culture indicated. Urine drug screen positive for opiates and tricyclics    CT scan ahead read by radiology as no acute hemorrhage. Motion artifact limits exam.    After the milligram of Narcan IV the patient did wake up slightly he is still rather sleepy and somnolent level off to sleep. He will recover very short period time look at you try to answer questions or shake his head basically 5 to off to get covered backup essentially he is cold. We will give the patient another dose of Narcan 2 mg IM at this time especially with his blood sugar being 153 do not believe his hypoglycemia is now the reason why he is still slightly altered on his mental status and believe this is secondary most likely due to his narcotic/opioid use. Patient is a still sleepy and somnolent but he will respond to tactile verbal stimuli. Fortunately is not one to be agreeable with staff I was going to place EJ however the patient is refusing to lay on his back. It just continues to roll over in the bed even with positioning he fights and rolls back over. The risk of try to place a central line or EJ this patient is greater than the risk of him not having any peripheral access at this time since he is still providing with IM medication and treatment.  We will proceed without any peripheral access on this patient for the time being I do believe he is been awake up in the near future and probably be discharged home after we were able to discuss with him the situation and he'll be able to understand what is going on. Patient will be given another dose of IM  Narcan 2 mg just to see if there is additional dose has any effect on his somnolence. Patient resting here comfortably for several hours exactly before and a half. His blood sugar did trend back down and was 38 at one time. He was given another dose of IM glucagon however with a blood sugar of 38 the patient was actually able to be awakened was calmer setting with this. He tolerated taking oral glucose 40% one tube was given a meal tray. Patient ate the tray without any difficulty. He has provided us with several numbers to call for him for a ride home however unfortunately not been able to get up with anyone at this time to come pick him up. Did discuss with the patient again the accusation that his family told EMS about him using too much of his insulin to purposely causes hypoglycemia. Patient again adamantly refuses this statement or declines that he is doing this action. However I did advise him that he is giving them ammunition for this statement because he's needed several evaluations here in the emergency department. On the average she usually presents twice in a month and the nurse several months that he does not come emergency department and then the cycle start over again x-rayed the 2nd time that he's been seen this month already for days apart. EMS is Rick went out to his house 3 times this week with 1 ending in a refusal of transport. EMS states that they actually were at his house 8 times in 1 week but I'm not sure exactly when this was. Patient advised at this time he was hypoglycemic but also I believe he may have taken too much of his chronic pain medication which she is prescribed.  Patient really did not have much response to the use of the glucagon with it when his blood sugar was actually at a normal range but once given the Narcan he began

## 2019-07-15 NOTE — ED TRIAGE NOTES
Patient arrives with [facility] for BG of 31, IM glucagon given en route. Patient non responsive.

## 2019-07-17 ENCOUNTER — TELEPHONE (OUTPATIENT)
Dept: INTERNAL MEDICINE | Facility: CLINIC | Age: 54
End: 2019-07-17

## 2019-07-17 NOTE — TELEPHONE ENCOUNTER
PATIENT HAD TO CANCEL APPOINTMENT AS HE COULD NOT FIND A RIDE TO APPOINTMENT. HE WAS TOLD TO CALL DR. REEDER IF HE COULD NOT MAKE THE APPOINTMENT AND SHE WOULD ADJUST MEDICATIONS. PATIENT IS HAVING ISSUES GETTING TO APPOINTMENTS DUE TO ONLY HAVING ONE LEG. PATIENTS NUMBER [phone]

## 2019-07-18 RX ORDER — SYRING-NEEDL,DISP,INSUL,0.3 ML 28GX1/2"
SYRINGE, EMPTY DISPOSABLE MISCELLANEOUS
Qty: 200 EACH | Refills: 1 | Status: SHIPPED | OUTPATIENT
Start: 2019-07-18 | End: 2019-07-21

## 2019-07-18 NOTE — TELEPHONE ENCOUNTER
PATIENT STATES THAT HE NEEDS AN INSULIN REFILL BUT THE INSULIN RX THAT WAS REQUESTED ISN'T THE RIGHT ONE.     CALL BACK [phone]

## 2019-07-19 ENCOUNTER — TELEPHONE (OUTPATIENT)
Dept: INTERNAL MEDICINE | Facility: CLINIC | Age: 54
End: 2019-07-19

## 2019-07-19 NOTE — TELEPHONE ENCOUNTER
PATIENT IS REQUESTING A NEW RX FOR LONG ACTING INSULIN. HE IS CURRENTLY TAKING HUMULIN N TWICE DAILY AND STATES THAT YOU WERE GOING TO CHANGE THIS MEDICATION. HE IS NO LONGER ON THE FEEDING TUBE. HE STATES THAT HE IS AFRAID TO SLEEP AS HE FEARS HIS BG WILL DROP TOO LOW WHILE SLEEPING.     CALL BACK [phone]

## 2019-07-21 DIAGNOSIS — IMO0002 UNCONTROLLED TYPE 1 DIABETES MELLITUS WITH COMPLICATION: Primary | ICD-10-CM

## 2019-07-21 RX ORDER — INSULIN GLARGINE 100 [IU]/ML
26 INJECTION, SOLUTION SUBCUTANEOUS EVERY MORNING
Qty: 10 PEN | Refills: 3 | Status: ON HOLD | OUTPATIENT
Start: 2019-07-21 | End: 2019-08-28 | Stop reason: SDUPTHER

## 2019-07-21 RX ORDER — INSULIN GLARGINE 100 [IU]/ML
26 INJECTION, SOLUTION SUBCUTANEOUS EVERY MORNING
Qty: 10 PEN | Refills: 3 | Status: SHIPPED | OUTPATIENT
Start: 2019-07-21 | End: 2019-07-21 | Stop reason: SDUPTHER

## 2019-07-21 NOTE — TELEPHONE ENCOUNTER
Spoke to patient. Reviewed insulin regimen    - discontinue NPH as he is no longer on tube feeds  - Start Lantus 26 units qAM  - Start Novolog 3 units with each meal + CF of 1 unit: 50 > 150    Will try to contact his home health company so that written insrtuctions can be given to him.     Shana Monahan MD

## 2019-08-15 ENCOUNTER — APPOINTMENT (OUTPATIENT)
Dept: GENERAL RADIOLOGY | Facility: HOSPITAL | Age: 54
End: 2019-08-15
Payer: MEDICARE

## 2019-08-15 ENCOUNTER — HOSPITAL ENCOUNTER (INPATIENT)
Facility: HOSPITAL | Age: 54
LOS: 13 days | Discharge: NURSING, REHAB OR LONG-TERM CARE | End: 2019-08-28
Attending: INTERNAL MEDICINE | Admitting: INTERNAL MEDICINE

## 2019-08-15 ENCOUNTER — DOCUMENTATION (OUTPATIENT)
Dept: SOCIAL WORK | Facility: HOSPITAL | Age: 54
End: 2019-08-15

## 2019-08-15 ENCOUNTER — APPOINTMENT (OUTPATIENT)
Dept: GENERAL RADIOLOGY | Facility: HOSPITAL | Age: 54
End: 2019-08-15

## 2019-08-15 ENCOUNTER — HOSPITAL ENCOUNTER (EMERGENCY)
Facility: HOSPITAL | Age: 54
Discharge: ANOTHER HOSPITAL | End: 2019-08-15
Attending: HOSPITALIST
Payer: MEDICARE

## 2019-08-15 ENCOUNTER — APPOINTMENT (OUTPATIENT)
Dept: CT IMAGING | Facility: HOSPITAL | Age: 54
End: 2019-08-15
Payer: MEDICARE

## 2019-08-15 VITALS
HEART RATE: 60 BPM | OXYGEN SATURATION: 99 % | TEMPERATURE: 97.8 F | DIASTOLIC BLOOD PRESSURE: 46 MMHG | SYSTOLIC BLOOD PRESSURE: 131 MMHG | RESPIRATION RATE: 12 BRPM

## 2019-08-15 DIAGNOSIS — IMO0002 UNCONTROLLED TYPE 1 DIABETES MELLITUS WITH COMPLICATION: ICD-10-CM

## 2019-08-15 DIAGNOSIS — J18.9 PNEUMONIA DUE TO ORGANISM: ICD-10-CM

## 2019-08-15 DIAGNOSIS — E10.10 DIABETIC KETOACIDOSIS WITHOUT COMA ASSOCIATED WITH TYPE 1 DIABETES MELLITUS (HCC): ICD-10-CM

## 2019-08-15 DIAGNOSIS — R41.82 ALTERED MENTAL STATUS, UNSPECIFIED ALTERED MENTAL STATUS TYPE: Primary | ICD-10-CM

## 2019-08-15 DIAGNOSIS — Z74.09 IMPAIRED MOBILITY AND ADLS: Primary | ICD-10-CM

## 2019-08-15 DIAGNOSIS — Z78.9 IMPAIRED MOBILITY AND ADLS: Primary | ICD-10-CM

## 2019-08-15 PROBLEM — Z91.199 NON-COMPLIANCE: Status: ACTIVE | Noted: 2019-08-15

## 2019-08-15 PROBLEM — R41.0 DELIRIUM: Status: ACTIVE | Noted: 2019-08-15

## 2019-08-15 PROBLEM — E11.10 DKA (DIABETIC KETOACIDOSES): Status: ACTIVE | Noted: 2019-08-15

## 2019-08-15 PROBLEM — F19.10 DRUG ABUSE (HCC): Status: ACTIVE | Noted: 2019-08-15

## 2019-08-15 PROBLEM — Z79.01 CHRONIC ANTICOAGULATION: Status: ACTIVE | Noted: 2019-08-15

## 2019-08-15 LAB
A/G RATIO: 1.4 (ref 0.8–2)
ACETONE BLOOD: ABNORMAL
ALBUMIN SERPL-MCNC: 4.2 G/DL (ref 3.4–4.8)
ALP BLD-CCNC: 270 U/L (ref 25–100)
ALT SERPL-CCNC: 15 U/L (ref 4–36)
AMPHETAMINE SCREEN, URINE: ABNORMAL
ANION GAP SERPL CALCULATED.3IONS-SCNC: 38 MMOL/L (ref 3–16)
ARTERIAL PATENCY WRIST A: ABNORMAL
AST SERPL-CCNC: 24 U/L (ref 8–33)
ATMOSPHERIC PRESS: ABNORMAL MMHG
BARBITURATE SCREEN URINE: ABNORMAL
BASE EXCESS BLDA CALC-SCNC: -9.6 MMOL/L (ref 0–2)
BASE EXCESS VENOUS: -26.5 MMOL/L (ref -3–3)
BASOPHILS ABSOLUTE: 0.1 K/UL (ref 0–0.1)
BASOPHILS RELATIVE PERCENT: 0.4 %
BDY SITE: ABNORMAL
BENZODIAZEPINE SCREEN, URINE: ABNORMAL
BILIRUB SERPL-MCNC: 0.6 MG/DL (ref 0.3–1.2)
BILIRUBIN URINE: NEGATIVE
BLOOD, URINE: ABNORMAL
BODY TEMPERATURE: 98.6 C
BUN BLDV-MCNC: 34 MG/DL (ref 6–20)
CALCIUM SERPL-MCNC: 8.7 MG/DL (ref 8.5–10.5)
CANNABINOID SCREEN URINE: ABNORMAL
CHLORIDE BLD-SCNC: 80 MMOL/L (ref 98–107)
CLARITY: CLEAR
CO2 BLDA-SCNC: 10.5 MMOL/L (ref 23–27)
CO2: 4 MMOL/L (ref 20–30)
COCAINE METABOLITE SCREEN URINE: ABNORMAL
COHGB MFR BLD: 0.4 % (ref 0–2)
COLOR: YELLOW
CREAT SERPL-MCNC: 1.5 MG/DL (ref 0.4–1.2)
EOSINOPHILS ABSOLUTE: 0 K/UL (ref 0–0.4)
EOSINOPHILS RELATIVE PERCENT: 0.1 %
EPAP: 0
EPITHELIAL CELLS, UA: ABNORMAL /HPF
FIO2: 0.2 %
GFR AFRICAN AMERICAN: 59
GFR NON-AFRICAN AMERICAN: 48
GLOBULIN: 3.1 G/DL
GLUCOSE BLD-MCNC: 566 MG/DL (ref 74–106)
GLUCOSE BLD-MCNC: 585 MG/DL (ref 74–106)
GLUCOSE BLD-MCNC: 586 MG/DL (ref 74–106)
GLUCOSE BLD-MCNC: 684 MG/DL (ref 74–106)
GLUCOSE BLDC GLUCOMTR-MCNC: 444 MG/DL (ref 70–130)
GLUCOSE URINE: 500 MG/DL
HCO3 BLDA-SCNC: 10.1 MMOL/L (ref 20–26)
HCO3 VENOUS: 4.6 MMOL/L (ref 23–29)
HCT VFR BLD CALC: 38.2 %
HCT VFR BLD CALC: 41.1 % (ref 40–54)
HEMOGLOBIN: 11.9 G/DL (ref 13–18)
HGB BLDA-MCNC: 12.5 G/DL (ref 13.5–17.5)
HOROWITZ INDEX BLD+IHG-RTO: 28 %
IMMATURE GRANULOCYTES #: 1 K/UL
IMMATURE GRANULOCYTES %: 3.3 % (ref 0–5)
IPAP: 0
KETONES, URINE: 80 MG/DL
LACTIC ACID: 4.4 MMOL/L (ref 0.4–2)
LEUKOCYTE ESTERASE, URINE: NEGATIVE
LIPASE: 5 U/L (ref 5.6–51.3)
LYMPHOCYTES ABSOLUTE: 2.2 K/UL (ref 1.5–4)
LYMPHOCYTES RELATIVE PERCENT: 7.1 %
Lab: ABNORMAL
Lab: ABNORMAL
MCH RBC QN AUTO: 24.5 PG (ref 27–32)
MCHC RBC AUTO-ENTMCNC: 29 G/DL (ref 31–35)
MCV RBC AUTO: 84.6 FL (ref 80–100)
METHADONE SCREEN, URINE: ABNORMAL
METHAMPHETAMINE, URINE: ABNORMAL
METHGB BLD QL: 0.6 % (ref 0–1.5)
MICROSCOPIC EXAMINATION: YES
MODALITY: ABNORMAL
MONOCYTES ABSOLUTE: 2 K/UL (ref 0.2–0.8)
MONOCYTES RELATIVE PERCENT: 6.4 %
NEUTROPHILS ABSOLUTE: 25.7 K/UL (ref 2–7.5)
NEUTROPHILS RELATIVE PERCENT: 82.7 %
NITRITE, URINE: NEGATIVE
NOTE: ABNORMAL
NOTIFIED BY: ABNORMAL
NOTIFIED WHO: ABNORMAL
O2 SAT, VEN: 71 %
O2 THERAPY: ABNORMAL
OPIATE SCREEN URINE: ABNORMAL
OXYHGB MFR BLDV: 97.1 % (ref 94–99)
PAW @ PEAK INSP FLOW SETTING VENT: 0 CMH2O
PCO2 BLDA: 12.4 MM HG
PCO2 TEMP ADJ BLD: 12.4 MM HG (ref 35–48)
PCO2, VEN: 22.1 MMHG (ref 40–50)
PDW BLD-RTO: 24.6 % (ref 11–16)
PERFORMED ON: ABNORMAL
PH BLDA: 7.52 PH UNITS (ref 7.35–7.45)
PH UA: 5 (ref 5–8)
PH VENOUS: 6.94 (ref 7.35–7.45)
PH, TEMP CORRECTED: 7.52 PH UNITS
PHENCYCLIDINE SCREEN URINE: ABNORMAL
PLATELET # BLD: 502 K/UL (ref 150–400)
PMV BLD AUTO: 10.2 FL (ref 6–10)
PO2 BLDA: 137 MM HG (ref 83–108)
PO2 TEMP ADJ BLD: 137 MM HG (ref 83–108)
PO2, VEN: 54.2 MMHG (ref 25–40)
POTASSIUM REFLEX MAGNESIUM: 6.3 MMOL/L (ref 3.4–5.1)
PROPOXYPHENE SCREEN, URINE: ABNORMAL
PROTEIN UA: 30 MG/DL
RBC # BLD: 4.86 M/UL (ref 4.5–6)
RBC UA: ABNORMAL /HPF (ref 0–2)
SODIUM BLD-SCNC: 122 MMOL/L (ref 136–145)
SPECIFIC GRAVITY UA: 1.01 (ref 1–1.03)
TCO2 CALC VENOUS: 5 MMOL/L
TOTAL PROTEIN: 7.3 G/DL (ref 6.4–8.3)
TOTAL RATE: 0 BREATHS/MINUTE
TRICYCLIC, URINE: POSITIVE
TROPONIN: <0.3 NG/ML
UR OXYCODONE RAPID SCREEN: ABNORMAL
URINE REFLEX TO CULTURE: ABNORMAL
URINE TYPE: ABNORMAL
UROBILINOGEN, URINE: 0.2 E.U./DL
VENTILATOR MODE: ABNORMAL
WBC # BLD: 31 K/UL (ref 4–11)
WBC UA: ABNORMAL /HPF (ref 0–5)

## 2019-08-15 PROCEDURE — 82962 GLUCOSE BLOOD TEST: CPT

## 2019-08-15 PROCEDURE — 82009 KETONE BODYS QUAL: CPT

## 2019-08-15 PROCEDURE — 63710000001 INSULIN REGULAR HUMAN PER 5 UNITS: Performed by: NURSE PRACTITIONER

## 2019-08-15 PROCEDURE — 83690 ASSAY OF LIPASE: CPT

## 2019-08-15 PROCEDURE — 6360000002 HC RX W HCPCS: Performed by: HOSPITALIST

## 2019-08-15 PROCEDURE — 93010 ELECTROCARDIOGRAM REPORT: CPT | Performed by: INTERNAL MEDICINE

## 2019-08-15 PROCEDURE — 71045 X-RAY EXAM CHEST 1 VIEW: CPT

## 2019-08-15 PROCEDURE — 84484 ASSAY OF TROPONIN QUANT: CPT

## 2019-08-15 PROCEDURE — 25010000002 HEPARIN (PORCINE) PER 1000 UNITS: Performed by: NURSE PRACTITIONER

## 2019-08-15 PROCEDURE — 93005 ELECTROCARDIOGRAM TRACING: CPT

## 2019-08-15 PROCEDURE — 2580000003 HC RX 258: Performed by: HOSPITALIST

## 2019-08-15 PROCEDURE — 96366 THER/PROPH/DIAG IV INF ADDON: CPT

## 2019-08-15 PROCEDURE — 82805 BLOOD GASES W/O2 SATURATION: CPT

## 2019-08-15 PROCEDURE — 80053 COMPREHEN METABOLIC PANEL: CPT

## 2019-08-15 PROCEDURE — 83605 ASSAY OF LACTIC ACID: CPT

## 2019-08-15 PROCEDURE — 81001 URINALYSIS AUTO W/SCOPE: CPT

## 2019-08-15 PROCEDURE — 96365 THER/PROPH/DIAG IV INF INIT: CPT

## 2019-08-15 PROCEDURE — 36600 WITHDRAWAL OF ARTERIAL BLOOD: CPT

## 2019-08-15 PROCEDURE — 6370000000 HC RX 637 (ALT 250 FOR IP): Performed by: HOSPITALIST

## 2019-08-15 PROCEDURE — 80307 DRUG TEST PRSMV CHEM ANLYZR: CPT

## 2019-08-15 PROCEDURE — 85025 COMPLETE CBC W/AUTO DIFF WBC: CPT

## 2019-08-15 PROCEDURE — 99291 CRITICAL CARE FIRST HOUR: CPT | Performed by: INTERNAL MEDICINE

## 2019-08-15 PROCEDURE — 99291 CRITICAL CARE FIRST HOUR: CPT

## 2019-08-15 PROCEDURE — 2500000003 HC RX 250 WO HCPCS: Performed by: HOSPITALIST

## 2019-08-15 PROCEDURE — 82803 BLOOD GASES ANY COMBINATION: CPT

## 2019-08-15 PROCEDURE — 93005 ELECTROCARDIOGRAM TRACING: CPT | Performed by: NURSE PRACTITIONER

## 2019-08-15 PROCEDURE — 36415 COLL VENOUS BLD VENIPUNCTURE: CPT

## 2019-08-15 PROCEDURE — 87040 BLOOD CULTURE FOR BACTERIA: CPT

## 2019-08-15 RX ORDER — SODIUM CHLORIDE AND POTASSIUM CHLORIDE 150; 450 MG/100ML; MG/100ML
250 INJECTION, SOLUTION INTRAVENOUS CONTINUOUS PRN
Status: DISCONTINUED | OUTPATIENT
Start: 2019-08-15 | End: 2019-08-16

## 2019-08-15 RX ORDER — LORAZEPAM 2 MG/ML
1 INJECTION INTRAMUSCULAR ONCE
Status: COMPLETED | OUTPATIENT
Start: 2019-08-15 | End: 2019-08-15

## 2019-08-15 RX ORDER — FAMOTIDINE 10 MG/ML
20 INJECTION, SOLUTION INTRAVENOUS EVERY 12 HOURS SCHEDULED
Status: DISCONTINUED | OUTPATIENT
Start: 2019-08-16 | End: 2019-08-19 | Stop reason: ALTCHOICE

## 2019-08-15 RX ORDER — DEXTROSE, SODIUM CHLORIDE, AND POTASSIUM CHLORIDE 5; .45; .15 G/100ML; G/100ML; G/100ML
250 INJECTION INTRAVENOUS CONTINUOUS PRN
Status: DISCONTINUED | OUTPATIENT
Start: 2019-08-15 | End: 2019-08-16

## 2019-08-15 RX ORDER — SODIUM CHLORIDE 0.9 % (FLUSH) 0.9 %
3-10 SYRINGE (ML) INJECTION AS NEEDED
Status: DISCONTINUED | OUTPATIENT
Start: 2019-08-15 | End: 2019-08-20

## 2019-08-15 RX ORDER — SODIUM CHLORIDE 9 MG/ML
10 INJECTION, SOLUTION INTRAVENOUS CONTINUOUS PRN
Status: DISCONTINUED | OUTPATIENT
Start: 2019-08-15 | End: 2019-08-16

## 2019-08-15 RX ORDER — SODIUM CHLORIDE 9 MG/ML
250 INJECTION, SOLUTION INTRAVENOUS CONTINUOUS PRN
Status: DISCONTINUED | OUTPATIENT
Start: 2019-08-15 | End: 2019-08-16

## 2019-08-15 RX ORDER — HALOPERIDOL 5 MG/ML
5 INJECTION INTRAMUSCULAR ONCE
Status: COMPLETED | OUTPATIENT
Start: 2019-08-15 | End: 2019-08-15

## 2019-08-15 RX ORDER — LORAZEPAM 2 MG/ML
2 INJECTION INTRAMUSCULAR ONCE
Status: COMPLETED | OUTPATIENT
Start: 2019-08-15 | End: 2019-08-15

## 2019-08-15 RX ORDER — SODIUM CHLORIDE AND POTASSIUM CHLORIDE 300; 900 MG/100ML; MG/100ML
250 INJECTION, SOLUTION INTRAVENOUS CONTINUOUS PRN
Status: DISCONTINUED | OUTPATIENT
Start: 2019-08-15 | End: 2019-08-16

## 2019-08-15 RX ORDER — DEXTROSE MONOHYDRATE 25 G/50ML
12.5 INJECTION, SOLUTION INTRAVENOUS AS NEEDED
Status: DISCONTINUED | OUTPATIENT
Start: 2019-08-15 | End: 2019-08-20

## 2019-08-15 RX ORDER — DEXTROSE AND SODIUM CHLORIDE 5; .45 G/100ML; G/100ML
250 INJECTION, SOLUTION INTRAVENOUS CONTINUOUS PRN
Status: DISCONTINUED | OUTPATIENT
Start: 2019-08-15 | End: 2019-08-16

## 2019-08-15 RX ORDER — HEPARIN SODIUM 5000 [USP'U]/ML
5000 INJECTION, SOLUTION INTRAVENOUS; SUBCUTANEOUS EVERY 8 HOURS SCHEDULED
Status: DISCONTINUED | OUTPATIENT
Start: 2019-08-15 | End: 2019-08-17

## 2019-08-15 RX ORDER — SODIUM CHLORIDE 0.9 % (FLUSH) 0.9 %
10 SYRINGE (ML) INJECTION ONCE AS NEEDED
Status: DISCONTINUED | OUTPATIENT
Start: 2019-08-15 | End: 2019-08-20

## 2019-08-15 RX ORDER — DEXTROSE, SODIUM CHLORIDE, AND POTASSIUM CHLORIDE 5; .45; .3 G/100ML; G/100ML; G/100ML
250 INJECTION INTRAVENOUS CONTINUOUS PRN
Status: DISCONTINUED | OUTPATIENT
Start: 2019-08-15 | End: 2019-08-16

## 2019-08-15 RX ORDER — ONDANSETRON 2 MG/ML
4 INJECTION INTRAMUSCULAR; INTRAVENOUS EVERY 30 MIN PRN
Status: DISCONTINUED | OUTPATIENT
Start: 2019-08-15 | End: 2019-08-15 | Stop reason: HOSPADM

## 2019-08-15 RX ORDER — SODIUM CHLORIDE 450 MG/100ML
250 INJECTION, SOLUTION INTRAVENOUS CONTINUOUS PRN
Status: DISCONTINUED | OUTPATIENT
Start: 2019-08-15 | End: 2019-08-16

## 2019-08-15 RX ORDER — SODIUM CHLORIDE 0.9 % (FLUSH) 0.9 %
3 SYRINGE (ML) INJECTION EVERY 12 HOURS SCHEDULED
Status: DISCONTINUED | OUTPATIENT
Start: 2019-08-15 | End: 2019-08-23

## 2019-08-15 RX ORDER — 0.9 % SODIUM CHLORIDE 0.9 %
1000 INTRAVENOUS SOLUTION INTRAVENOUS ONCE
Status: COMPLETED | OUTPATIENT
Start: 2019-08-15 | End: 2019-08-15

## 2019-08-15 RX ORDER — SODIUM CHLORIDE AND POTASSIUM CHLORIDE 150; 900 MG/100ML; MG/100ML
250 INJECTION, SOLUTION INTRAVENOUS CONTINUOUS PRN
Status: DISCONTINUED | OUTPATIENT
Start: 2019-08-15 | End: 2019-08-16

## 2019-08-15 RX ADMIN — DEXMEDETOMIDINE 0.6 MCG/KG/HR: 100 INJECTION, SOLUTION, CONCENTRATE INTRAVENOUS at 23:24

## 2019-08-15 RX ADMIN — HALOPERIDOL LACTATE 5 MG: 5 INJECTION, SOLUTION INTRAMUSCULAR at 17:32

## 2019-08-15 RX ADMIN — SODIUM BICARBONATE: 84 INJECTION, SOLUTION INTRAVENOUS at 17:18

## 2019-08-15 RX ADMIN — SODIUM CHLORIDE 5 UNITS/HR: 9 INJECTION, SOLUTION INTRAVENOUS at 23:49

## 2019-08-15 RX ADMIN — SODIUM CHLORIDE 1000 ML: 9 INJECTION, SOLUTION INTRAVENOUS at 17:05

## 2019-08-15 RX ADMIN — ONDANSETRON 4 MG: 2 INJECTION INTRAMUSCULAR; INTRAVENOUS at 16:42

## 2019-08-15 RX ADMIN — LORAZEPAM 2 MG: 2 INJECTION, SOLUTION INTRAMUSCULAR; INTRAVENOUS at 21:28

## 2019-08-15 RX ADMIN — HEPARIN SODIUM 5000 UNITS: 5000 INJECTION INTRAVENOUS; SUBCUTANEOUS at 23:34

## 2019-08-15 RX ADMIN — SODIUM CHLORIDE 6 UNITS/HR: 9 INJECTION, SOLUTION INTRAVENOUS at 17:15

## 2019-08-15 RX ADMIN — HALOPERIDOL 5 MG: 5 INJECTION INTRAMUSCULAR at 17:19

## 2019-08-15 RX ADMIN — LORAZEPAM 1 MG: 2 INJECTION, SOLUTION INTRAMUSCULAR; INTRAVENOUS at 17:44

## 2019-08-15 RX ADMIN — LORAZEPAM 1 MG: 2 INJECTION, SOLUTION INTRAMUSCULAR; INTRAVENOUS at 16:45

## 2019-08-15 RX ADMIN — SODIUM BICARBONATE 50 MEQ: 84 INJECTION, SOLUTION INTRAVENOUS at 17:07

## 2019-08-15 RX ADMIN — SODIUM CHLORIDE 2000 ML: 9 INJECTION, SOLUTION INTRAVENOUS at 23:33

## 2019-08-15 RX ADMIN — CEFTRIAXONE 1 G: 1 INJECTION, POWDER, FOR SOLUTION INTRAMUSCULAR; INTRAVENOUS at 19:08

## 2019-08-15 NOTE — ED NOTES
Pt attempting to pull out IV and tubing. Arm board placed on pt arm and wrapped with kurlex.        Zana Carrillo RN  08/15/19 5980

## 2019-08-15 NOTE — ED PROVIDER NOTES
to patient's altered mental status. Except as noted above the remainder of the review of systems was reviewed and negative. PAST MEDICAL HISTORY     Past Medical History:   Diagnosis Date    Cancer [facility])     breast left    Cancer ([facility] [address]     stomach    Gastroparesis 2013    GERD (gastroesophageal reflux disease)     Hypertension     Neuropathy     Prostate disease     Type II or unspecified type diabetes mellitus without mention of complication, not stated as uncontrolled          SURGICAL HISTORY       Past Surgical History:   Procedure Laterality Date    BREAST SURGERY      CHOLECYSTECTOMY      FEMORAL-FEMORAL BYPASS GRAFT      FINGER TRIGGER RELEASE      GASTROSTOMY TUBE PLACEMENT  2013    HERNIA REPAIR      x2    LEG AMPUTATION BELOW KNEE Right     SMALL INTESTINE SURGERY           CURRENT MEDICATIONS       Previous Medications    B-D INS SYR ULTRAFINE .5CC/30G 30G X 1/2\" 0.5 ML MISC        EASY TOUCH INSULIN SYRINGE 31G X 5/16\" 0.5 ML MISC    USE TO INJECT INSULIN 3 TIMES A DAY AS DIRECTED    GABAPENTIN (NEURONTIN) 800 MG TABLET    Take 800 mg by mouth 4 times daily     HYDROCODONE-ACETAMINOPHEN (NORCO) 7.5-325 MG PER TABLET    Take 1 tablet by mouth every 6 hours as needed for Pain. INSULIN ASPART (NOVOLOG SC)    Inject into the skin 4 times daily Sliding scale    INSULIN NPH HUMAN, ISOPHANE, (NOVOLIN N SC)    Inject 14 Units into the skin 2 times daily     INSULIN SYRINGES, DISPOSABLE, U-100 0.5 ML MISC    Tid  Dx: 250.00    NONFORMULARY    Indications: round pink pill for nerves and sleep at hs     ZOLPIDEM (AMBIEN) 10 MG TABLET    Take by mouth nightly as needed for Sleep.        ALLERGIES     Phenergan [promethazine hcl]    FAMILY HISTORY       Family History   Problem Relation Age of Onset    Diabetes Mother     Cancer Father         leukemia          SOCIAL HISTORY       Social History     Socioeconomic History    Marital status:      Spouse name: Not on file   

## 2019-08-15 NOTE — ED TRIAGE NOTES
Pt brought in by [facility]. Pt presents with hyperglycemia, malaise, N/V, and AMS. Pt calling out, \"God help me\" upon arrival.  Refusing to stay in bed. Pt tachycardic and tachypneic.

## 2019-08-16 LAB
ALBUMIN SERPL-MCNC: 3.6 G/DL (ref 3.5–5.2)
ALBUMIN/GLOB SERPL: 1.5 G/DL
ALP SERPL-CCNC: 195 U/L (ref 39–117)
ALT SERPL W P-5'-P-CCNC: 13 U/L (ref 1–41)
AMYLASE SERPL-CCNC: 64 U/L (ref 28–100)
ANION GAP SERPL CALCULATED.3IONS-SCNC: 10 MMOL/L (ref 5–15)
ANION GAP SERPL CALCULATED.3IONS-SCNC: 13 MMOL/L (ref 5–15)
ANION GAP SERPL CALCULATED.3IONS-SCNC: 18 MMOL/L (ref 5–15)
AST SERPL-CCNC: 50 U/L (ref 1–40)
B-OH-BUTYR SERPL-SCNC: 3.71 MMOL/L (ref 0.02–0.27)
BACTERIA UR QL AUTO: ABNORMAL /HPF
BILIRUB SERPL-MCNC: 0.2 MG/DL (ref 0.2–1.2)
BILIRUB UR QL STRIP: NEGATIVE
BUN BLD-MCNC: 22 MG/DL (ref 6–20)
BUN BLD-MCNC: 25 MG/DL (ref 6–20)
BUN BLD-MCNC: 29 MG/DL (ref 6–20)
BUN/CREAT SERPL: 31.2 (ref 7–25)
BUN/CREAT SERPL: 32.8 (ref 7–25)
BUN/CREAT SERPL: 35.7 (ref 7–25)
CA-I SERPL ISE-MCNC: 1.21 MMOL/L (ref 1.12–1.32)
CALCIUM SPEC-SCNC: 7 MG/DL (ref 8.6–10.5)
CALCIUM SPEC-SCNC: 7.2 MG/DL (ref 8.6–10.5)
CALCIUM SPEC-SCNC: 7.8 MG/DL (ref 8.6–10.5)
CHLORIDE SERPL-SCNC: 104 MMOL/L (ref 98–107)
CHLORIDE SERPL-SCNC: 105 MMOL/L (ref 98–107)
CHLORIDE SERPL-SCNC: 108 MMOL/L (ref 98–107)
CHOLEST SERPL-MCNC: 111 MG/DL (ref 0–200)
CLARITY UR: CLEAR
CO2 SERPL-SCNC: 16 MMOL/L (ref 22–29)
CO2 SERPL-SCNC: 18 MMOL/L (ref 22–29)
CO2 SERPL-SCNC: 18 MMOL/L (ref 22–29)
COLOR UR: YELLOW
CREAT BLD-MCNC: 0.67 MG/DL (ref 0.76–1.27)
CREAT BLD-MCNC: 0.7 MG/DL (ref 0.76–1.27)
CREAT BLD-MCNC: 0.93 MG/DL (ref 0.76–1.27)
D-LACTATE SERPL-SCNC: 2.1 MMOL/L (ref 0.5–2)
D-LACTATE SERPL-SCNC: 2.2 MMOL/L (ref 0.5–2)
GFR SERPL CREATININE-BSD FRML MDRD: 117 ML/MIN/1.73
GFR SERPL CREATININE-BSD FRML MDRD: 123 ML/MIN/1.73
GFR SERPL CREATININE-BSD FRML MDRD: 84 ML/MIN/1.73
GLOBULIN UR ELPH-MCNC: 2.4 GM/DL
GLUCOSE BLD-MCNC: 198 MG/DL (ref 65–99)
GLUCOSE BLD-MCNC: 219 MG/DL (ref 65–99)
GLUCOSE BLD-MCNC: 233 MG/DL (ref 65–99)
GLUCOSE BLDC GLUCOMTR-MCNC: 113 MG/DL (ref 70–130)
GLUCOSE BLDC GLUCOMTR-MCNC: 122 MG/DL (ref 70–130)
GLUCOSE BLDC GLUCOMTR-MCNC: 132 MG/DL (ref 70–130)
GLUCOSE BLDC GLUCOMTR-MCNC: 135 MG/DL (ref 70–130)
GLUCOSE BLDC GLUCOMTR-MCNC: 136 MG/DL (ref 70–130)
GLUCOSE BLDC GLUCOMTR-MCNC: 140 MG/DL (ref 70–130)
GLUCOSE BLDC GLUCOMTR-MCNC: 142 MG/DL (ref 70–130)
GLUCOSE BLDC GLUCOMTR-MCNC: 160 MG/DL (ref 70–130)
GLUCOSE BLDC GLUCOMTR-MCNC: 165 MG/DL (ref 70–130)
GLUCOSE BLDC GLUCOMTR-MCNC: 178 MG/DL (ref 70–130)
GLUCOSE BLDC GLUCOMTR-MCNC: 179 MG/DL (ref 70–130)
GLUCOSE BLDC GLUCOMTR-MCNC: 181 MG/DL (ref 70–130)
GLUCOSE BLDC GLUCOMTR-MCNC: 196 MG/DL (ref 70–130)
GLUCOSE BLDC GLUCOMTR-MCNC: 198 MG/DL (ref 70–130)
GLUCOSE BLDC GLUCOMTR-MCNC: 207 MG/DL (ref 70–130)
GLUCOSE BLDC GLUCOMTR-MCNC: 225 MG/DL (ref 70–130)
GLUCOSE BLDC GLUCOMTR-MCNC: 229 MG/DL (ref 70–130)
GLUCOSE BLDC GLUCOMTR-MCNC: 231 MG/DL (ref 70–130)
GLUCOSE BLDC GLUCOMTR-MCNC: 232 MG/DL (ref 70–130)
GLUCOSE BLDC GLUCOMTR-MCNC: 256 MG/DL (ref 70–130)
GLUCOSE BLDC GLUCOMTR-MCNC: 266 MG/DL (ref 70–130)
GLUCOSE BLDC GLUCOMTR-MCNC: 267 MG/DL (ref 70–130)
GLUCOSE BLDC GLUCOMTR-MCNC: 274 MG/DL (ref 70–130)
GLUCOSE UR STRIP-MCNC: ABNORMAL MG/DL
HBA1C MFR BLD: 8.4 % (ref 4.8–5.6)
HDLC SERPL-MCNC: 32 MG/DL (ref 40–60)
HGB UR QL STRIP.AUTO: ABNORMAL
HOLD SPECIMEN: NORMAL
HYALINE CASTS UR QL AUTO: ABNORMAL /LPF
KETONES UR QL STRIP: ABNORMAL
LDLC SERPL CALC-MCNC: 61 MG/DL (ref 0–100)
LDLC/HDLC SERPL: 1.91 {RATIO}
LEUKOCYTE ESTERASE UR QL STRIP.AUTO: ABNORMAL
LIPASE SERPL-CCNC: 4 U/L (ref 13–60)
MAGNESIUM SERPL-MCNC: 1.9 MG/DL (ref 1.6–2.6)
MAGNESIUM SERPL-MCNC: 2 MG/DL (ref 1.6–2.6)
NITRITE UR QL STRIP: NEGATIVE
NT-PROBNP SERPL-MCNC: 395.6 PG/ML (ref 5–900)
OSMOLALITY SERPL: 306 MOSM/KG (ref 275–295)
OSMOLALITY UR: 694 MOSM/KG (ref 300–1100)
PH UR STRIP.AUTO: <=5 [PH] (ref 5–8)
PHOSPHATE SERPL-MCNC: 1.7 MG/DL (ref 2.5–4.5)
PHOSPHATE SERPL-MCNC: 2.1 MG/DL (ref 2.5–4.5)
PHOSPHATE SERPL-MCNC: 2.3 MG/DL (ref 2.5–4.5)
POTASSIUM BLD-SCNC: 3.9 MMOL/L (ref 3.5–5.2)
POTASSIUM BLD-SCNC: 4.1 MMOL/L (ref 3.5–5.2)
POTASSIUM BLD-SCNC: 4.4 MMOL/L (ref 3.5–5.2)
PROCALCITONIN SERPL-MCNC: 1.2 NG/ML (ref 0.1–0.25)
PROT SERPL-MCNC: 6 G/DL (ref 6–8.5)
PROT UR QL STRIP: ABNORMAL
RBC # UR: ABNORMAL /HPF
REF LAB TEST METHOD: ABNORMAL
SODIUM BLD-SCNC: 136 MMOL/L (ref 136–145)
SODIUM BLD-SCNC: 136 MMOL/L (ref 136–145)
SODIUM BLD-SCNC: 138 MMOL/L (ref 136–145)
SP GR UR STRIP: 1.02 (ref 1–1.03)
SQUAMOUS #/AREA URNS HPF: ABNORMAL /HPF
TRIGL SERPL-MCNC: 89 MG/DL (ref 0–150)
TROPONIN T SERPL-MCNC: <0.01 NG/ML (ref 0–0.03)
UROBILINOGEN UR QL STRIP: ABNORMAL
VLDLC SERPL-MCNC: 17.8 MG/DL
WBC UR QL AUTO: ABNORMAL /HPF

## 2019-08-16 PROCEDURE — 63710000001 INSULIN REGULAR HUMAN PER 5 UNITS: Performed by: NURSE PRACTITIONER

## 2019-08-16 PROCEDURE — 80053 COMPREHEN METABOLIC PANEL: CPT | Performed by: NURSE PRACTITIONER

## 2019-08-16 PROCEDURE — 25010000002 HEPARIN (PORCINE) PER 1000 UNITS: Performed by: NURSE PRACTITIONER

## 2019-08-16 PROCEDURE — 83930 ASSAY OF BLOOD OSMOLALITY: CPT | Performed by: NURSE PRACTITIONER

## 2019-08-16 PROCEDURE — 82010 KETONE BODYS QUAN: CPT | Performed by: INTERNAL MEDICINE

## 2019-08-16 PROCEDURE — 83605 ASSAY OF LACTIC ACID: CPT | Performed by: NURSE PRACTITIONER

## 2019-08-16 PROCEDURE — 87040 BLOOD CULTURE FOR BACTERIA: CPT | Performed by: NURSE PRACTITIONER

## 2019-08-16 PROCEDURE — 99291 CRITICAL CARE FIRST HOUR: CPT | Performed by: INTERNAL MEDICINE

## 2019-08-16 PROCEDURE — 82150 ASSAY OF AMYLASE: CPT | Performed by: NURSE PRACTITIONER

## 2019-08-16 PROCEDURE — C1894 INTRO/SHEATH, NON-LASER: HCPCS

## 2019-08-16 PROCEDURE — 83690 ASSAY OF LIPASE: CPT | Performed by: NURSE PRACTITIONER

## 2019-08-16 PROCEDURE — 82962 GLUCOSE BLOOD TEST: CPT

## 2019-08-16 PROCEDURE — 84484 ASSAY OF TROPONIN QUANT: CPT | Performed by: NURSE PRACTITIONER

## 2019-08-16 PROCEDURE — C1751 CATH, INF, PER/CENT/MIDLINE: HCPCS

## 2019-08-16 PROCEDURE — 87086 URINE CULTURE/COLONY COUNT: CPT | Performed by: NURSE PRACTITIONER

## 2019-08-16 PROCEDURE — 02HV33Z INSERTION OF INFUSION DEVICE INTO SUPERIOR VENA CAVA, PERCUTANEOUS APPROACH: ICD-10-PCS | Performed by: INTERNAL MEDICINE

## 2019-08-16 PROCEDURE — 81001 URINALYSIS AUTO W/SCOPE: CPT | Performed by: NURSE PRACTITIONER

## 2019-08-16 PROCEDURE — 84100 ASSAY OF PHOSPHORUS: CPT | Performed by: NURSE PRACTITIONER

## 2019-08-16 PROCEDURE — 83735 ASSAY OF MAGNESIUM: CPT | Performed by: NURSE PRACTITIONER

## 2019-08-16 PROCEDURE — 83036 HEMOGLOBIN GLYCOSYLATED A1C: CPT | Performed by: NURSE PRACTITIONER

## 2019-08-16 PROCEDURE — 83880 ASSAY OF NATRIURETIC PEPTIDE: CPT | Performed by: NURSE PRACTITIONER

## 2019-08-16 PROCEDURE — 84145 PROCALCITONIN (PCT): CPT | Performed by: NURSE PRACTITIONER

## 2019-08-16 PROCEDURE — 83935 ASSAY OF URINE OSMOLALITY: CPT | Performed by: NURSE PRACTITIONER

## 2019-08-16 PROCEDURE — 25010000002 AZITHROMYCIN: Performed by: INTERNAL MEDICINE

## 2019-08-16 PROCEDURE — 25010000002 CEFTRIAXONE PER 250 MG: Performed by: INTERNAL MEDICINE

## 2019-08-16 PROCEDURE — 82330 ASSAY OF CALCIUM: CPT | Performed by: NURSE PRACTITIONER

## 2019-08-16 PROCEDURE — 80061 LIPID PANEL: CPT | Performed by: NURSE PRACTITIONER

## 2019-08-16 RX ORDER — NITROGLYCERIN 0.4 MG/1
0.4 TABLET SUBLINGUAL
COMMUNITY

## 2019-08-16 RX ORDER — SODIUM CHLORIDE 0.9 % (FLUSH) 0.9 %
10 SYRINGE (ML) INJECTION AS NEEDED
Status: DISCONTINUED | OUTPATIENT
Start: 2019-08-16 | End: 2019-08-29 | Stop reason: HOSPADM

## 2019-08-16 RX ORDER — DEXTROSE, SODIUM CHLORIDE, AND POTASSIUM CHLORIDE 5; .45; .15 G/100ML; G/100ML; G/100ML
100 INJECTION INTRAVENOUS CONTINUOUS
Status: DISCONTINUED | OUTPATIENT
Start: 2019-08-16 | End: 2019-08-20

## 2019-08-16 RX ORDER — SODIUM CHLORIDE 0.9 % (FLUSH) 0.9 %
10 SYRINGE (ML) INJECTION EVERY 12 HOURS SCHEDULED
Status: DISCONTINUED | OUTPATIENT
Start: 2019-08-16 | End: 2019-08-29 | Stop reason: HOSPADM

## 2019-08-16 RX ORDER — METOCLOPRAMIDE 5 MG/1
5 TABLET ORAL 3 TIMES DAILY PRN
COMMUNITY

## 2019-08-16 RX ORDER — NYSTATIN 100000 U/G
CREAM TOPICAL 3 TIMES DAILY
COMMUNITY
End: 2019-08-29 | Stop reason: HOSPADM

## 2019-08-16 RX ADMIN — SODIUM CHLORIDE, PRESERVATIVE FREE 3 ML: 5 INJECTION INTRAVENOUS at 00:10

## 2019-08-16 RX ADMIN — POTASSIUM CHLORIDE, DEXTROSE MONOHYDRATE AND SODIUM CHLORIDE 250 ML/HR: 150; 5; 450 INJECTION, SOLUTION INTRAVENOUS at 10:21

## 2019-08-16 RX ADMIN — DEXMEDETOMIDINE 0.8 MCG/KG/HR: 100 INJECTION, SOLUTION, CONCENTRATE INTRAVENOUS at 16:13

## 2019-08-16 RX ADMIN — CEFTRIAXONE SODIUM 1 G: 1 INJECTION, POWDER, FOR SOLUTION INTRAMUSCULAR; INTRAVENOUS at 08:24

## 2019-08-16 RX ADMIN — POTASSIUM CHLORIDE, DEXTROSE MONOHYDRATE AND SODIUM CHLORIDE 250 ML/HR: 150; 5; 450 INJECTION, SOLUTION INTRAVENOUS at 06:04

## 2019-08-16 RX ADMIN — SODIUM CHLORIDE, PRESERVATIVE FREE 10 ML: 5 INJECTION INTRAVENOUS at 20:04

## 2019-08-16 RX ADMIN — SODIUM CHLORIDE 250 ML/HR: 9 INJECTION, SOLUTION INTRAVENOUS at 01:34

## 2019-08-16 RX ADMIN — HEPARIN SODIUM 5000 UNITS: 5000 INJECTION INTRAVENOUS; SUBCUTANEOUS at 06:04

## 2019-08-16 RX ADMIN — FAMOTIDINE 20 MG: 10 INJECTION, SOLUTION INTRAVENOUS at 20:04

## 2019-08-16 RX ADMIN — HEPARIN SODIUM 5000 UNITS: 5000 INJECTION INTRAVENOUS; SUBCUTANEOUS at 14:58

## 2019-08-16 RX ADMIN — POTASSIUM CHLORIDE, DEXTROSE MONOHYDRATE AND SODIUM CHLORIDE 250 ML/HR: 150; 5; 450 INJECTION, SOLUTION INTRAVENOUS at 02:08

## 2019-08-16 RX ADMIN — SODIUM CHLORIDE 0.5 UNITS/HR: 9 INJECTION, SOLUTION INTRAVENOUS at 15:00

## 2019-08-16 RX ADMIN — AZITHROMYCIN MONOHYDRATE 500 MG: 500 INJECTION, POWDER, LYOPHILIZED, FOR SOLUTION INTRAVENOUS at 00:47

## 2019-08-16 RX ADMIN — SODIUM PHOSPHATE, MONOBASIC, MONOHYDRATE 30 MMOL: 276; 142 INJECTION, SOLUTION INTRAVENOUS at 03:23

## 2019-08-16 RX ADMIN — POTASSIUM CHLORIDE, DEXTROSE MONOHYDRATE AND SODIUM CHLORIDE 250 ML/HR: 150; 5; 450 INJECTION, SOLUTION INTRAVENOUS at 23:13

## 2019-08-16 RX ADMIN — FAMOTIDINE 20 MG: 10 INJECTION, SOLUTION INTRAVENOUS at 00:26

## 2019-08-16 RX ADMIN — FAMOTIDINE 20 MG: 10 INJECTION, SOLUTION INTRAVENOUS at 08:24

## 2019-08-16 RX ADMIN — POTASSIUM CHLORIDE, DEXTROSE MONOHYDRATE AND SODIUM CHLORIDE 250 ML/HR: 150; 5; 450 INJECTION, SOLUTION INTRAVENOUS at 19:05

## 2019-08-16 RX ADMIN — POTASSIUM CHLORIDE, DEXTROSE MONOHYDRATE AND SODIUM CHLORIDE 250 ML/HR: 150; 5; 450 INJECTION, SOLUTION INTRAVENOUS at 15:04

## 2019-08-16 RX ADMIN — POTASSIUM CHLORIDE, DEXTROSE MONOHYDRATE AND SODIUM CHLORIDE 250 ML/HR: 150; 5; 450 INJECTION, SOLUTION INTRAVENOUS at 14:23

## 2019-08-16 NOTE — ED NOTES
Pt report to Sharif Voss RN. Pt to be admitted to [facility], [address], Room [address].       Saroj Salazar RN  08/15/19 2055

## 2019-08-16 NOTE — PROGRESS NOTES
ACC REVIEW REPORT: [facility]        PATIENT NAME: Joey Saunders    PATIENT ID: [id number]    BED: N221    BED TYPE: CRITICAL CARE     BED GIVEN TO: SARAN WARREN    TIME BED GIVEN:     YOB: 1965    AGE: 54    GENDER: MALE    TODAY'S DATE: 8/15/2019    TRANSFER DATE: 8/15/2019    TRANSFERRING FACILITY: [facility]    TRANSFERRING FACILITY PHONE # : [phone]    TRANSFERRING MD: DR. NAPOLEON LOVING     DATE/TIME REQUEST RECEIVED: 8/15/19 1930    [facility] RN: JAVON ROBIN RN    REPORT FROM:SARAN WARREN RN     TIME REPORT TAKEN:     DIAGNOSIS: DKA, LL PNA, AMS    REASON FOR TRANSFER TO [facility]: HIGHER LEVEL OF CARE    TRANSPORTATION: EMS GROUND    CLINICAL REASON FOR TRANSFER TO [facility]:HIGHER LEVEL OF CARE       CLINICAL INFORMATION    ALLERGIES: PHENERGAN    CROSS: #16 FR    LAST VITAL SIGNS:  TIME:   TEMP: 97.8  PULSE: 121  B/P: 91/44  RESP: 18    MEDS/IV FLUIDS: 1 LITER NS BOLUS, INSULIN @ 10 UNITS. LAST BG AT  , BG WAS OVER 600 WHEN PT CAME IN. 2 MG IV ATIVAN, 10 MG IV HALDOL, 50 NA BICARB, THEN 150 ML/HR WITH 1 AMP OF BICARB, 1 GM OF ROCEPHIN      CARDIAC SYSTEM:    Is patient taking or has patient been given any drugs that could increase bleeding? XARELTO  (Plavix, Brilinta, Effient, Eliquis, Xarelto, Warfarin, Integrilin, Angiomax)    DRUMG PER DAY          RESPIRATORY SYSTEM:    OXYGEN: 2.5 L    O2 SAT: 100%    ADMINISTRATION ROUTE: N/C    IMAGING FINDINGS: LEFT LOBE PNA    CNS/MUSCULOSKELETAL    ALERT AND ORIENTED: NURSE STATES PATIENT IS COMPLETELY NEUROLOGICALLY INAPPROPRIATE AND IS COMBATIVE, MOANING AND KICKING AT THE STAFF WITH THE LEFT LEG. THE RIGHT ONE IS A BKA. PATIENT IS IN 3 POINT RESTRAINTS.     GI//GY    PAST MEDICAL HISTORY: DM, NONCOMPLIANCE, HTN, NEUROPATHY, LEFT BREAST CANCER, STOMACH CANCER, PROSTATE DISEASE, HERNIA ON STOMACH WITH ATTEMPTED REPAIRS X 2, S/P G-TUBE REMOVAL, FEM- BYPASS, RIGHT BKA, 1/2 PPD SMOKER.     OTHER SYMPTOM NOTES:  POSTITIVE ONLY FOR TCA'S IN URINE, BUT NURSE STATES HE WAS MUMBLING HE SNORTED OXY'S, LORTABS, AND NEURONTIN    ADDITIONAL NOTES: IV'S-#22 NAOMI, #22 RAC (NURSE STATES HE IS AN EXTREMELY HARD STICK). WBC'S 31, , K 6.3, CO2 4, BUN 34, CR 1.5, ANION GAP 38, GFR 48.            Jami Daigle RN  8/15/2019  8:58 PM

## 2019-08-17 LAB
ANION GAP SERPL CALCULATED.3IONS-SCNC: 12 MMOL/L (ref 5–15)
BACTERIA SPEC AEROBE CULT: NO GROWTH
BUN BLD-MCNC: 5 MG/DL (ref 6–20)
BUN/CREAT SERPL: 10.9 (ref 7–25)
CALCIUM SPEC-SCNC: 7.6 MG/DL (ref 8.6–10.5)
CHLORIDE SERPL-SCNC: 105 MMOL/L (ref 98–107)
CO2 SERPL-SCNC: 19 MMOL/L (ref 22–29)
CREAT BLD-MCNC: 0.46 MG/DL (ref 0.76–1.27)
GFR SERPL CREATININE-BSD FRML MDRD: >150 ML/MIN/1.73
GLUCOSE BLD-MCNC: 186 MG/DL (ref 65–99)
GLUCOSE BLDC GLUCOMTR-MCNC: 123 MG/DL (ref 70–130)
GLUCOSE BLDC GLUCOMTR-MCNC: 135 MG/DL (ref 70–130)
GLUCOSE BLDC GLUCOMTR-MCNC: 135 MG/DL (ref 70–130)
GLUCOSE BLDC GLUCOMTR-MCNC: 144 MG/DL (ref 70–130)
GLUCOSE BLDC GLUCOMTR-MCNC: 153 MG/DL (ref 70–130)
GLUCOSE BLDC GLUCOMTR-MCNC: 157 MG/DL (ref 70–130)
GLUCOSE BLDC GLUCOMTR-MCNC: 159 MG/DL (ref 70–130)
GLUCOSE BLDC GLUCOMTR-MCNC: 165 MG/DL (ref 70–130)
GLUCOSE BLDC GLUCOMTR-MCNC: 166 MG/DL (ref 70–130)
GLUCOSE BLDC GLUCOMTR-MCNC: 171 MG/DL (ref 70–130)
GLUCOSE BLDC GLUCOMTR-MCNC: 172 MG/DL (ref 70–130)
GLUCOSE BLDC GLUCOMTR-MCNC: 179 MG/DL (ref 70–130)
GLUCOSE BLDC GLUCOMTR-MCNC: 199 MG/DL (ref 70–130)
GLUCOSE BLDC GLUCOMTR-MCNC: 204 MG/DL (ref 70–130)
GLUCOSE BLDC GLUCOMTR-MCNC: 212 MG/DL (ref 70–130)
GLUCOSE BLDC GLUCOMTR-MCNC: 242 MG/DL (ref 70–130)
GLUCOSE BLDC GLUCOMTR-MCNC: 245 MG/DL (ref 70–130)
GLUCOSE BLDC GLUCOMTR-MCNC: 249 MG/DL (ref 70–130)
GLUCOSE BLDC GLUCOMTR-MCNC: 256 MG/DL (ref 70–130)
GLUCOSE BLDC GLUCOMTR-MCNC: 264 MG/DL (ref 70–130)
GLUCOSE BLDC GLUCOMTR-MCNC: 264 MG/DL (ref 70–130)
GLUCOSE BLDC GLUCOMTR-MCNC: 275 MG/DL (ref 70–130)
GLUCOSE BLDC GLUCOMTR-MCNC: 299 MG/DL (ref 70–130)
GLUCOSE BLDC GLUCOMTR-MCNC: 83 MG/DL (ref 70–130)
MAGNESIUM SERPL-MCNC: 1.6 MG/DL (ref 1.6–2.6)
PHOSPHATE SERPL-MCNC: 1.4 MG/DL (ref 2.5–4.5)
PHOSPHATE SERPL-MCNC: 2.3 MG/DL (ref 2.5–4.5)
POTASSIUM BLD-SCNC: 4.5 MMOL/L (ref 3.5–5.2)
SODIUM BLD-SCNC: 136 MMOL/L (ref 136–145)

## 2019-08-17 PROCEDURE — 82962 GLUCOSE BLOOD TEST: CPT

## 2019-08-17 PROCEDURE — 84100 ASSAY OF PHOSPHORUS: CPT | Performed by: INTERNAL MEDICINE

## 2019-08-17 PROCEDURE — 80048 BASIC METABOLIC PNL TOTAL CA: CPT | Performed by: INTERNAL MEDICINE

## 2019-08-17 PROCEDURE — 83735 ASSAY OF MAGNESIUM: CPT | Performed by: INTERNAL MEDICINE

## 2019-08-17 PROCEDURE — 25010000002 CEFTRIAXONE PER 250 MG: Performed by: INTERNAL MEDICINE

## 2019-08-17 PROCEDURE — 25010000002 HEPARIN (PORCINE) PER 1000 UNITS: Performed by: NURSE PRACTITIONER

## 2019-08-17 PROCEDURE — 99233 SBSQ HOSP IP/OBS HIGH 50: CPT | Performed by: INTERNAL MEDICINE

## 2019-08-17 PROCEDURE — 25010000002 AZITHROMYCIN PER 500 MG: Performed by: INTERNAL MEDICINE

## 2019-08-17 RX ORDER — POTASSIUM CHLORIDE 1.5 G/1.77G
40 POWDER, FOR SOLUTION ORAL AS NEEDED
Status: DISCONTINUED | OUTPATIENT
Start: 2019-08-17 | End: 2019-08-29 | Stop reason: HOSPADM

## 2019-08-17 RX ORDER — DIAZEPAM 5 MG/1
10 TABLET ORAL EVERY 8 HOURS SCHEDULED
Status: DISCONTINUED | OUTPATIENT
Start: 2019-08-17 | End: 2019-08-18

## 2019-08-17 RX ORDER — MAGNESIUM SULFATE HEPTAHYDRATE 40 MG/ML
4 INJECTION, SOLUTION INTRAVENOUS AS NEEDED
Status: DISCONTINUED | OUTPATIENT
Start: 2019-08-17 | End: 2019-08-29 | Stop reason: HOSPADM

## 2019-08-17 RX ORDER — MAGNESIUM SULFATE HEPTAHYDRATE 40 MG/ML
2 INJECTION, SOLUTION INTRAVENOUS AS NEEDED
Status: DISCONTINUED | OUTPATIENT
Start: 2019-08-17 | End: 2019-08-29 | Stop reason: HOSPADM

## 2019-08-17 RX ORDER — POTASSIUM CHLORIDE 750 MG/1
40 CAPSULE, EXTENDED RELEASE ORAL AS NEEDED
Status: DISCONTINUED | OUTPATIENT
Start: 2019-08-17 | End: 2019-08-29 | Stop reason: HOSPADM

## 2019-08-17 RX ADMIN — DEXTROSE 50 % IN WATER (D50W) INTRAVENOUS SYRINGE 12.5 G: at 00:06

## 2019-08-17 RX ADMIN — HEPARIN SODIUM 5000 UNITS: 5000 INJECTION INTRAVENOUS; SUBCUTANEOUS at 00:06

## 2019-08-17 RX ADMIN — FAMOTIDINE 20 MG: 10 INJECTION, SOLUTION INTRAVENOUS at 08:51

## 2019-08-17 RX ADMIN — DEXMEDETOMIDINE 1 MCG/KG/HR: 100 INJECTION, SOLUTION, CONCENTRATE INTRAVENOUS at 08:51

## 2019-08-17 RX ADMIN — SODIUM PHOSPHATE, MONOBASIC, MONOHYDRATE 30 MMOL: 276; 142 INJECTION, SOLUTION INTRAVENOUS at 13:09

## 2019-08-17 RX ADMIN — POTASSIUM CHLORIDE, DEXTROSE MONOHYDRATE AND SODIUM CHLORIDE 250 ML/HR: 150; 5; 450 INJECTION, SOLUTION INTRAVENOUS at 22:10

## 2019-08-17 RX ADMIN — POTASSIUM CHLORIDE, DEXTROSE MONOHYDRATE AND SODIUM CHLORIDE 250 ML/HR: 150; 5; 450 INJECTION, SOLUTION INTRAVENOUS at 05:02

## 2019-08-17 RX ADMIN — SODIUM CHLORIDE, PRESERVATIVE FREE 10 ML: 5 INJECTION INTRAVENOUS at 22:09

## 2019-08-17 RX ADMIN — DIAZEPAM 10 MG: 5 TABLET ORAL at 12:33

## 2019-08-17 RX ADMIN — HEPARIN SODIUM 5000 UNITS: 5000 INJECTION INTRAVENOUS; SUBCUTANEOUS at 13:09

## 2019-08-17 RX ADMIN — MAGNESIUM SULFATE HEPTAHYDRATE 4 G: 40 INJECTION, SOLUTION INTRAVENOUS at 12:33

## 2019-08-17 RX ADMIN — RIVAROXABAN 20 MG: 20 TABLET, FILM COATED ORAL at 18:02

## 2019-08-17 RX ADMIN — DIAZEPAM 10 MG: 5 TABLET ORAL at 22:09

## 2019-08-17 RX ADMIN — POTASSIUM CHLORIDE, DEXTROSE MONOHYDRATE AND SODIUM CHLORIDE 250 ML/HR: 150; 5; 450 INJECTION, SOLUTION INTRAVENOUS at 13:32

## 2019-08-17 RX ADMIN — CEFTRIAXONE SODIUM 1 G: 1 INJECTION, POWDER, FOR SOLUTION INTRAMUSCULAR; INTRAVENOUS at 08:51

## 2019-08-17 RX ADMIN — HEPARIN SODIUM 5000 UNITS: 5000 INJECTION INTRAVENOUS; SUBCUTANEOUS at 06:17

## 2019-08-17 RX ADMIN — SODIUM CHLORIDE, PRESERVATIVE FREE 10 ML: 5 INJECTION INTRAVENOUS at 09:02

## 2019-08-17 RX ADMIN — FAMOTIDINE 20 MG: 10 INJECTION, SOLUTION INTRAVENOUS at 22:09

## 2019-08-17 RX ADMIN — SODIUM CHLORIDE, PRESERVATIVE FREE 3 ML: 5 INJECTION INTRAVENOUS at 09:03

## 2019-08-17 RX ADMIN — POTASSIUM CHLORIDE, DEXTROSE MONOHYDRATE AND SODIUM CHLORIDE 250 ML/HR: 150; 5; 450 INJECTION, SOLUTION INTRAVENOUS at 09:48

## 2019-08-17 RX ADMIN — AZITHROMYCIN MONOHYDRATE 500 MG: 500 INJECTION, POWDER, LYOPHILIZED, FOR SOLUTION INTRAVENOUS at 00:14

## 2019-08-17 RX ADMIN — POTASSIUM CHLORIDE, DEXTROSE MONOHYDRATE AND SODIUM CHLORIDE 250 ML/HR: 150; 5; 450 INJECTION, SOLUTION INTRAVENOUS at 17:52

## 2019-08-18 LAB
ALBUMIN SERPL-MCNC: 2.8 G/DL (ref 3.5–5.2)
ALBUMIN/GLOB SERPL: 1.1 G/DL
ALP SERPL-CCNC: 188 U/L (ref 39–117)
ALT SERPL W P-5'-P-CCNC: 17 U/L (ref 1–41)
AMMONIA BLD-SCNC: 29 UMOL/L (ref 16–60)
ANION GAP SERPL CALCULATED.3IONS-SCNC: 10 MMOL/L (ref 5–15)
AST SERPL-CCNC: 27 U/L (ref 1–40)
BILIRUB SERPL-MCNC: 0.3 MG/DL (ref 0.2–1.2)
BUN BLD-MCNC: 4 MG/DL (ref 6–20)
BUN/CREAT SERPL: 10.5 (ref 7–25)
CA-I SERPL ISE-MCNC: 1 MMOL/L (ref 1.12–1.32)
CALCIUM SPEC-SCNC: 7.6 MG/DL (ref 8.6–10.5)
CHLORIDE SERPL-SCNC: 99 MMOL/L (ref 98–107)
CO2 SERPL-SCNC: 24 MMOL/L (ref 22–29)
CREAT BLD-MCNC: 0.38 MG/DL (ref 0.76–1.27)
D-LACTATE SERPL-SCNC: 1.5 MMOL/L (ref 0.5–2)
DEPRECATED RDW RBC AUTO: 63.3 FL (ref 37–54)
ERYTHROCYTE [DISTWIDTH] IN BLOOD BY AUTOMATED COUNT: 23.1 % (ref 12.3–15.4)
GFR SERPL CREATININE-BSD FRML MDRD: >150 ML/MIN/1.73
GLOBULIN UR ELPH-MCNC: 2.5 GM/DL
GLUCOSE BLD-MCNC: 111 MG/DL (ref 65–99)
GLUCOSE BLDC GLUCOMTR-MCNC: 105 MG/DL (ref 70–130)
GLUCOSE BLDC GLUCOMTR-MCNC: 109 MG/DL (ref 70–130)
GLUCOSE BLDC GLUCOMTR-MCNC: 112 MG/DL (ref 70–130)
GLUCOSE BLDC GLUCOMTR-MCNC: 129 MG/DL (ref 70–130)
GLUCOSE BLDC GLUCOMTR-MCNC: 144 MG/DL (ref 70–130)
GLUCOSE BLDC GLUCOMTR-MCNC: 163 MG/DL (ref 70–130)
GLUCOSE BLDC GLUCOMTR-MCNC: 166 MG/DL (ref 70–130)
GLUCOSE BLDC GLUCOMTR-MCNC: 179 MG/DL (ref 70–130)
GLUCOSE BLDC GLUCOMTR-MCNC: 180 MG/DL (ref 70–130)
GLUCOSE BLDC GLUCOMTR-MCNC: 187 MG/DL (ref 70–130)
GLUCOSE BLDC GLUCOMTR-MCNC: 189 MG/DL (ref 70–130)
GLUCOSE BLDC GLUCOMTR-MCNC: 195 MG/DL (ref 70–130)
GLUCOSE BLDC GLUCOMTR-MCNC: 222 MG/DL (ref 70–130)
GLUCOSE BLDC GLUCOMTR-MCNC: 241 MG/DL (ref 70–130)
GLUCOSE BLDC GLUCOMTR-MCNC: 241 MG/DL (ref 70–130)
GLUCOSE BLDC GLUCOMTR-MCNC: 243 MG/DL (ref 70–130)
GLUCOSE BLDC GLUCOMTR-MCNC: 249 MG/DL (ref 70–130)
GLUCOSE BLDC GLUCOMTR-MCNC: 266 MG/DL (ref 70–130)
GLUCOSE BLDC GLUCOMTR-MCNC: 291 MG/DL (ref 70–130)
GLUCOSE BLDC GLUCOMTR-MCNC: 92 MG/DL (ref 70–130)
GLUCOSE BLDC GLUCOMTR-MCNC: 96 MG/DL (ref 70–130)
HCT VFR BLD AUTO: 31.1 % (ref 37.5–51)
HGB BLD-MCNC: 10 G/DL (ref 13–17.7)
MAGNESIUM SERPL-MCNC: 1.7 MG/DL (ref 1.6–2.6)
MCH RBC QN AUTO: 24.7 PG (ref 26.6–33)
MCHC RBC AUTO-ENTMCNC: 32.2 G/DL (ref 31.5–35.7)
MCV RBC AUTO: 76.8 FL (ref 79–97)
PHOSPHATE SERPL-MCNC: 2.1 MG/DL (ref 2.5–4.5)
PLATELET # BLD AUTO: 177 10*3/MM3 (ref 140–450)
PMV BLD AUTO: 9.8 FL (ref 6–12)
POTASSIUM BLD-SCNC: 4 MMOL/L (ref 3.5–5.2)
PROT SERPL-MCNC: 5.3 G/DL (ref 6–8.5)
RBC # BLD AUTO: 4.05 10*6/MM3 (ref 4.14–5.8)
SODIUM BLD-SCNC: 133 MMOL/L (ref 136–145)
WBC NRBC COR # BLD: 7.36 10*3/MM3 (ref 3.4–10.8)

## 2019-08-18 PROCEDURE — 82140 ASSAY OF AMMONIA: CPT | Performed by: INTERNAL MEDICINE

## 2019-08-18 PROCEDURE — 83605 ASSAY OF LACTIC ACID: CPT | Performed by: INTERNAL MEDICINE

## 2019-08-18 PROCEDURE — 82330 ASSAY OF CALCIUM: CPT | Performed by: INTERNAL MEDICINE

## 2019-08-18 PROCEDURE — 83735 ASSAY OF MAGNESIUM: CPT | Performed by: INTERNAL MEDICINE

## 2019-08-18 PROCEDURE — 25010000002 AZITHROMYCIN PER 500 MG: Performed by: INTERNAL MEDICINE

## 2019-08-18 PROCEDURE — 82962 GLUCOSE BLOOD TEST: CPT

## 2019-08-18 PROCEDURE — 80053 COMPREHEN METABOLIC PANEL: CPT | Performed by: INTERNAL MEDICINE

## 2019-08-18 PROCEDURE — 63710000001 INSULIN REGULAR HUMAN PER 5 UNITS: Performed by: NURSE PRACTITIONER

## 2019-08-18 PROCEDURE — 25010000002 CEFTRIAXONE PER 250 MG: Performed by: INTERNAL MEDICINE

## 2019-08-18 PROCEDURE — 84100 ASSAY OF PHOSPHORUS: CPT | Performed by: INTERNAL MEDICINE

## 2019-08-18 PROCEDURE — 99291 CRITICAL CARE FIRST HOUR: CPT | Performed by: INTERNAL MEDICINE

## 2019-08-18 PROCEDURE — 85027 COMPLETE CBC AUTOMATED: CPT | Performed by: INTERNAL MEDICINE

## 2019-08-18 RX ORDER — GABAPENTIN 300 MG/1
300 CAPSULE ORAL EVERY 8 HOURS SCHEDULED
Status: DISCONTINUED | OUTPATIENT
Start: 2019-08-18 | End: 2019-08-19

## 2019-08-18 RX ORDER — DIAZEPAM 5 MG/1
15 TABLET ORAL EVERY 6 HOURS
Status: DISCONTINUED | OUTPATIENT
Start: 2019-08-18 | End: 2019-08-19

## 2019-08-18 RX ADMIN — SODIUM CHLORIDE 3.5 UNITS/HR: 9 INJECTION, SOLUTION INTRAVENOUS at 08:39

## 2019-08-18 RX ADMIN — CEFTRIAXONE SODIUM 1 G: 1 INJECTION, POWDER, FOR SOLUTION INTRAMUSCULAR; INTRAVENOUS at 08:06

## 2019-08-18 RX ADMIN — POTASSIUM CHLORIDE, DEXTROSE MONOHYDRATE AND SODIUM CHLORIDE 100 ML/HR: 150; 5; 450 INJECTION, SOLUTION INTRAVENOUS at 13:38

## 2019-08-18 RX ADMIN — DEXTROSE 50 % IN WATER (D50W) INTRAVENOUS SYRINGE 12.5 G: at 02:13

## 2019-08-18 RX ADMIN — FAMOTIDINE 20 MG: 10 INJECTION, SOLUTION INTRAVENOUS at 08:07

## 2019-08-18 RX ADMIN — GABAPENTIN 300 MG: 300 CAPSULE ORAL at 21:34

## 2019-08-18 RX ADMIN — SODIUM PHOSPHATE, MONOBASIC, MONOHYDRATE 15 MMOL: 276; 142 INJECTION, SOLUTION INTRAVENOUS at 01:06

## 2019-08-18 RX ADMIN — POTASSIUM CHLORIDE, DEXTROSE MONOHYDRATE AND SODIUM CHLORIDE 250 ML/HR: 150; 5; 450 INJECTION, SOLUTION INTRAVENOUS at 06:25

## 2019-08-18 RX ADMIN — AZITHROMYCIN MONOHYDRATE 500 MG: 500 INJECTION, POWDER, LYOPHILIZED, FOR SOLUTION INTRAVENOUS at 01:06

## 2019-08-18 RX ADMIN — FAMOTIDINE 20 MG: 10 INJECTION, SOLUTION INTRAVENOUS at 21:34

## 2019-08-18 RX ADMIN — DIAZEPAM 15 MG: 5 TABLET ORAL at 18:55

## 2019-08-18 RX ADMIN — SODIUM CHLORIDE, PRESERVATIVE FREE 10 ML: 5 INJECTION INTRAVENOUS at 21:34

## 2019-08-18 RX ADMIN — RIVAROXABAN 20 MG: 20 TABLET, FILM COATED ORAL at 18:56

## 2019-08-18 RX ADMIN — SODIUM CHLORIDE, PRESERVATIVE FREE 10 ML: 5 INJECTION INTRAVENOUS at 08:08

## 2019-08-18 RX ADMIN — DIAZEPAM 10 MG: 5 TABLET ORAL at 06:13

## 2019-08-18 RX ADMIN — DEXMEDETOMIDINE 1 MCG/KG/HR: 100 INJECTION, SOLUTION, CONCENTRATE INTRAVENOUS at 06:13

## 2019-08-18 RX ADMIN — POTASSIUM PHOSPHATE, MONOBASIC AND POTASSIUM PHOSPHATE, DIBASIC 15 MMOL: 224; 236 INJECTION, SOLUTION INTRAVENOUS at 15:26

## 2019-08-18 RX ADMIN — GABAPENTIN 300 MG: 300 CAPSULE ORAL at 13:33

## 2019-08-18 RX ADMIN — DIAZEPAM 15 MG: 5 TABLET ORAL at 23:39

## 2019-08-18 RX ADMIN — AZITHROMYCIN MONOHYDRATE 500 MG: 500 INJECTION, POWDER, LYOPHILIZED, FOR SOLUTION INTRAVENOUS at 23:40

## 2019-08-18 RX ADMIN — SODIUM CHLORIDE, PRESERVATIVE FREE 3 ML: 5 INJECTION INTRAVENOUS at 08:07

## 2019-08-18 RX ADMIN — POTASSIUM CHLORIDE, DEXTROSE MONOHYDRATE AND SODIUM CHLORIDE 250 ML/HR: 150; 5; 450 INJECTION, SOLUTION INTRAVENOUS at 02:28

## 2019-08-18 RX ADMIN — DIAZEPAM 15 MG: 5 TABLET ORAL at 12:15

## 2019-08-18 RX ADMIN — POTASSIUM CHLORIDE, DEXTROSE MONOHYDRATE AND SODIUM CHLORIDE 100 ML/HR: 150; 5; 450 INJECTION, SOLUTION INTRAVENOUS at 23:50

## 2019-08-18 RX ADMIN — SODIUM CHLORIDE, PRESERVATIVE FREE 3 ML: 5 INJECTION INTRAVENOUS at 21:34

## 2019-08-19 ENCOUNTER — APPOINTMENT (OUTPATIENT)
Dept: GENERAL RADIOLOGY | Facility: HOSPITAL | Age: 54
End: 2019-08-19

## 2019-08-19 PROBLEM — E87.20 LACTIC ACIDOSIS: Status: ACTIVE | Noted: 2019-08-19

## 2019-08-19 LAB
ALBUMIN SERPL-MCNC: 2.5 G/DL (ref 3.5–5.2)
ALBUMIN/GLOB SERPL: 0.8 G/DL
ALP SERPL-CCNC: 158 U/L (ref 39–117)
ALT SERPL W P-5'-P-CCNC: 16 U/L (ref 1–41)
ANION GAP SERPL CALCULATED.3IONS-SCNC: 8 MMOL/L (ref 5–15)
AST SERPL-CCNC: 21 U/L (ref 1–40)
BILIRUB SERPL-MCNC: 0.3 MG/DL (ref 0.2–1.2)
BUN BLD-MCNC: 6 MG/DL (ref 6–20)
BUN/CREAT SERPL: 13 (ref 7–25)
CA-I SERPL ISE-MCNC: 1.2 MMOL/L (ref 1.12–1.32)
CALCIUM SPEC-SCNC: 8.1 MG/DL (ref 8.6–10.5)
CHLORIDE SERPL-SCNC: 102 MMOL/L (ref 98–107)
CO2 SERPL-SCNC: 24 MMOL/L (ref 22–29)
CREAT BLD-MCNC: 0.46 MG/DL (ref 0.76–1.27)
D-LACTATE SERPL-SCNC: 2.4 MMOL/L (ref 0.5–2)
DEPRECATED RDW RBC AUTO: 61 FL (ref 37–54)
ERYTHROCYTE [DISTWIDTH] IN BLOOD BY AUTOMATED COUNT: 22.2 % (ref 12.3–15.4)
GFR SERPL CREATININE-BSD FRML MDRD: >150 ML/MIN/1.73
GLOBULIN UR ELPH-MCNC: 3.1 GM/DL
GLUCOSE BLD-MCNC: 240 MG/DL (ref 65–99)
GLUCOSE BLDC GLUCOMTR-MCNC: 105 MG/DL (ref 70–130)
GLUCOSE BLDC GLUCOMTR-MCNC: 133 MG/DL (ref 70–130)
GLUCOSE BLDC GLUCOMTR-MCNC: 136 MG/DL (ref 70–130)
GLUCOSE BLDC GLUCOMTR-MCNC: 157 MG/DL (ref 70–130)
GLUCOSE BLDC GLUCOMTR-MCNC: 199 MG/DL (ref 70–130)
GLUCOSE BLDC GLUCOMTR-MCNC: 205 MG/DL (ref 70–130)
GLUCOSE BLDC GLUCOMTR-MCNC: 210 MG/DL (ref 70–130)
GLUCOSE BLDC GLUCOMTR-MCNC: 234 MG/DL (ref 70–130)
GLUCOSE BLDC GLUCOMTR-MCNC: 238 MG/DL (ref 70–130)
GLUCOSE BLDC GLUCOMTR-MCNC: 245 MG/DL (ref 70–130)
GLUCOSE BLDC GLUCOMTR-MCNC: 248 MG/DL (ref 70–130)
GLUCOSE BLDC GLUCOMTR-MCNC: 263 MG/DL (ref 70–130)
GLUCOSE BLDC GLUCOMTR-MCNC: 269 MG/DL (ref 70–130)
GLUCOSE BLDC GLUCOMTR-MCNC: 287 MG/DL (ref 70–130)
GLUCOSE BLDC GLUCOMTR-MCNC: 83 MG/DL (ref 70–130)
GLUCOSE BLDC GLUCOMTR-MCNC: 96 MG/DL (ref 70–130)
HCT VFR BLD AUTO: 36.8 % (ref 37.5–51)
HGB BLD-MCNC: 11.7 G/DL (ref 13–17.7)
MAGNESIUM SERPL-MCNC: 1.8 MG/DL (ref 1.6–2.6)
MCH RBC QN AUTO: 24.2 PG (ref 26.6–33)
MCHC RBC AUTO-ENTMCNC: 31.8 G/DL (ref 31.5–35.7)
MCV RBC AUTO: 76 FL (ref 79–97)
PHOSPHATE SERPL-MCNC: 2.3 MG/DL (ref 2.5–4.5)
PLATELET # BLD AUTO: 192 10*3/MM3 (ref 140–450)
PMV BLD AUTO: 9.5 FL (ref 6–12)
POTASSIUM BLD-SCNC: 4.6 MMOL/L (ref 3.5–5.2)
PROT SERPL-MCNC: 5.6 G/DL (ref 6–8.5)
RBC # BLD AUTO: 4.84 10*6/MM3 (ref 4.14–5.8)
SODIUM BLD-SCNC: 134 MMOL/L (ref 136–145)
WBC NRBC COR # BLD: 7.11 10*3/MM3 (ref 3.4–10.8)

## 2019-08-19 PROCEDURE — 83735 ASSAY OF MAGNESIUM: CPT | Performed by: INTERNAL MEDICINE

## 2019-08-19 PROCEDURE — 71045 X-RAY EXAM CHEST 1 VIEW: CPT

## 2019-08-19 PROCEDURE — 80053 COMPREHEN METABOLIC PANEL: CPT | Performed by: INTERNAL MEDICINE

## 2019-08-19 PROCEDURE — 82962 GLUCOSE BLOOD TEST: CPT

## 2019-08-19 PROCEDURE — 99233 SBSQ HOSP IP/OBS HIGH 50: CPT | Performed by: INTERNAL MEDICINE

## 2019-08-19 PROCEDURE — 82330 ASSAY OF CALCIUM: CPT | Performed by: INTERNAL MEDICINE

## 2019-08-19 PROCEDURE — 85027 COMPLETE CBC AUTOMATED: CPT | Performed by: INTERNAL MEDICINE

## 2019-08-19 PROCEDURE — 63710000001 INSULIN DETEMIR PER 5 UNITS: Performed by: INTERNAL MEDICINE

## 2019-08-19 PROCEDURE — 84100 ASSAY OF PHOSPHORUS: CPT | Performed by: INTERNAL MEDICINE

## 2019-08-19 PROCEDURE — 83605 ASSAY OF LACTIC ACID: CPT | Performed by: INTERNAL MEDICINE

## 2019-08-19 PROCEDURE — 63710000001 INSULIN LISPRO (HUMAN) PER 5 UNITS: Performed by: INTERNAL MEDICINE

## 2019-08-19 PROCEDURE — 25010000002 CEFTRIAXONE PER 250 MG: Performed by: INTERNAL MEDICINE

## 2019-08-19 RX ORDER — DIAZEPAM 5 MG/1
5 TABLET ORAL EVERY 6 HOURS
Status: DISCONTINUED | OUTPATIENT
Start: 2019-08-19 | End: 2019-08-21

## 2019-08-19 RX ORDER — FAMOTIDINE 20 MG/1
20 TABLET, FILM COATED ORAL 2 TIMES DAILY
Status: DISCONTINUED | OUTPATIENT
Start: 2019-08-19 | End: 2019-08-29 | Stop reason: HOSPADM

## 2019-08-19 RX ORDER — NICOTINE POLACRILEX 4 MG
15 LOZENGE BUCCAL
Status: DISCONTINUED | OUTPATIENT
Start: 2019-08-19 | End: 2019-08-29 | Stop reason: HOSPADM

## 2019-08-19 RX ORDER — GABAPENTIN 300 MG/1
600 CAPSULE ORAL EVERY 8 HOURS SCHEDULED
Status: DISCONTINUED | OUTPATIENT
Start: 2019-08-19 | End: 2019-08-25

## 2019-08-19 RX ORDER — HYDROCODONE BITARTRATE AND ACETAMINOPHEN 5; 325 MG/1; MG/1
1 TABLET ORAL EVERY 4 HOURS PRN
Status: DISCONTINUED | OUTPATIENT
Start: 2019-08-19 | End: 2019-08-22

## 2019-08-19 RX ORDER — DEXTROSE MONOHYDRATE 25 G/50ML
25 INJECTION, SOLUTION INTRAVENOUS
Status: DISCONTINUED | OUTPATIENT
Start: 2019-08-19 | End: 2019-08-29 | Stop reason: HOSPADM

## 2019-08-19 RX ADMIN — FAMOTIDINE 20 MG: 10 INJECTION, SOLUTION INTRAVENOUS at 08:04

## 2019-08-19 RX ADMIN — HYDROCODONE BITARTRATE AND ACETAMINOPHEN 1 TABLET: 5; 325 TABLET ORAL at 21:25

## 2019-08-19 RX ADMIN — GABAPENTIN 600 MG: 300 CAPSULE ORAL at 21:24

## 2019-08-19 RX ADMIN — HYDROCODONE BITARTRATE AND ACETAMINOPHEN 1 TABLET: 5; 325 TABLET ORAL at 17:22

## 2019-08-19 RX ADMIN — DIAZEPAM 15 MG: 5 TABLET ORAL at 07:00

## 2019-08-19 RX ADMIN — INSULIN LISPRO 6 UNITS: 100 INJECTION, SOLUTION INTRAVENOUS; SUBCUTANEOUS at 12:10

## 2019-08-19 RX ADMIN — DEXMEDETOMIDINE 0.8 MCG/KG/HR: 100 INJECTION, SOLUTION, CONCENTRATE INTRAVENOUS at 07:07

## 2019-08-19 RX ADMIN — SODIUM CHLORIDE, PRESERVATIVE FREE 10 ML: 5 INJECTION INTRAVENOUS at 08:11

## 2019-08-19 RX ADMIN — GABAPENTIN 600 MG: 300 CAPSULE ORAL at 14:50

## 2019-08-19 RX ADMIN — INSULIN LISPRO 4 UNITS: 100 INJECTION, SOLUTION INTRAVENOUS; SUBCUTANEOUS at 17:26

## 2019-08-19 RX ADMIN — INSULIN DETEMIR 15 UNITS: 100 INJECTION, SOLUTION SUBCUTANEOUS at 12:00

## 2019-08-19 RX ADMIN — SODIUM PHOSPHATE, MONOBASIC, MONOHYDRATE 15 MMOL: 276; 142 INJECTION, SOLUTION INTRAVENOUS at 17:22

## 2019-08-19 RX ADMIN — DIAZEPAM 5 MG: 5 TABLET ORAL at 17:22

## 2019-08-19 RX ADMIN — GABAPENTIN 300 MG: 300 CAPSULE ORAL at 07:00

## 2019-08-19 RX ADMIN — SODIUM CHLORIDE, PRESERVATIVE FREE 3 ML: 5 INJECTION INTRAVENOUS at 21:26

## 2019-08-19 RX ADMIN — FAMOTIDINE 20 MG: 20 TABLET ORAL at 21:24

## 2019-08-19 RX ADMIN — RIVAROXABAN 20 MG: 20 TABLET, FILM COATED ORAL at 17:22

## 2019-08-19 RX ADMIN — DEXTROSE 50 % IN WATER (D50W) INTRAVENOUS SYRINGE 12.5 G: at 00:06

## 2019-08-19 RX ADMIN — DEXTROSE 50 % IN WATER (D50W) INTRAVENOUS SYRINGE 12.5 G: at 00:59

## 2019-08-19 RX ADMIN — SODIUM CHLORIDE, PRESERVATIVE FREE 3 ML: 5 INJECTION INTRAVENOUS at 08:12

## 2019-08-19 RX ADMIN — DIAZEPAM 5 MG: 5 TABLET ORAL at 12:18

## 2019-08-19 RX ADMIN — CEFTRIAXONE SODIUM 1 G: 1 INJECTION, POWDER, FOR SOLUTION INTRAMUSCULAR; INTRAVENOUS at 08:05

## 2019-08-19 RX ADMIN — SODIUM CHLORIDE, PRESERVATIVE FREE 10 ML: 5 INJECTION INTRAVENOUS at 21:25

## 2019-08-19 RX ADMIN — INSULIN DETEMIR 20 UNITS: 100 INJECTION, SOLUTION SUBCUTANEOUS at 21:24

## 2019-08-19 RX ADMIN — HYDROCODONE BITARTRATE AND ACETAMINOPHEN 1 TABLET: 5; 325 TABLET ORAL at 11:10

## 2019-08-20 ENCOUNTER — APPOINTMENT (OUTPATIENT)
Dept: GENERAL RADIOLOGY | Facility: HOSPITAL | Age: 54
End: 2019-08-20

## 2019-08-20 LAB
ANION GAP SERPL CALCULATED.3IONS-SCNC: 10 MMOL/L (ref 5–15)
BASOPHILS # BLD AUTO: 0.02 10*3/MM3 (ref 0–0.2)
BASOPHILS NFR BLD AUTO: 0.2 % (ref 0–1.5)
BLOOD CULTURE, ROUTINE: NORMAL
BUN BLD-MCNC: 12 MG/DL (ref 6–20)
BUN/CREAT SERPL: 34.3 (ref 7–25)
CALCIUM SPEC-SCNC: 8.1 MG/DL (ref 8.6–10.5)
CHLORIDE SERPL-SCNC: 103 MMOL/L (ref 98–107)
CO2 SERPL-SCNC: 26 MMOL/L (ref 22–29)
CREAT BLD-MCNC: 0.35 MG/DL (ref 0.76–1.27)
D-LACTATE SERPL-SCNC: 0.8 MMOL/L (ref 0.5–2)
DEPRECATED RDW RBC AUTO: 61.3 FL (ref 37–54)
EOSINOPHIL # BLD AUTO: 0.04 10*3/MM3 (ref 0–0.4)
EOSINOPHIL NFR BLD AUTO: 0.4 % (ref 0.3–6.2)
ERYTHROCYTE [DISTWIDTH] IN BLOOD BY AUTOMATED COUNT: 22.5 % (ref 12.3–15.4)
GFR SERPL CREATININE-BSD FRML MDRD: >150 ML/MIN/1.73
GLUCOSE BLD-MCNC: 31 MG/DL (ref 65–99)
GLUCOSE BLDC GLUCOMTR-MCNC: 110 MG/DL (ref 70–130)
GLUCOSE BLDC GLUCOMTR-MCNC: 117 MG/DL (ref 70–130)
GLUCOSE BLDC GLUCOMTR-MCNC: 118 MG/DL (ref 70–130)
GLUCOSE BLDC GLUCOMTR-MCNC: 119 MG/DL (ref 70–130)
GLUCOSE BLDC GLUCOMTR-MCNC: 141 MG/DL (ref 70–130)
GLUCOSE BLDC GLUCOMTR-MCNC: 198 MG/DL (ref 70–130)
GLUCOSE BLDC GLUCOMTR-MCNC: 201 MG/DL (ref 70–130)
GLUCOSE BLDC GLUCOMTR-MCNC: 204 MG/DL (ref 70–130)
GLUCOSE BLDC GLUCOMTR-MCNC: 205 MG/DL (ref 70–130)
GLUCOSE BLDC GLUCOMTR-MCNC: 223 MG/DL (ref 70–130)
GLUCOSE BLDC GLUCOMTR-MCNC: 31 MG/DL (ref 70–130)
GLUCOSE BLDC GLUCOMTR-MCNC: 45 MG/DL (ref 70–130)
GLUCOSE BLDC GLUCOMTR-MCNC: 48 MG/DL (ref 70–130)
GLUCOSE BLDC GLUCOMTR-MCNC: 67 MG/DL (ref 70–130)
GLUCOSE BLDC GLUCOMTR-MCNC: 80 MG/DL (ref 70–130)
GLUCOSE BLDC GLUCOMTR-MCNC: 91 MG/DL (ref 70–130)
HCT VFR BLD AUTO: 34.5 % (ref 37.5–51)
HGB BLD-MCNC: 11 G/DL (ref 13–17.7)
IMM GRANULOCYTES # BLD AUTO: 0.03 10*3/MM3 (ref 0–0.05)
IMM GRANULOCYTES NFR BLD AUTO: 0.3 % (ref 0–0.5)
LYMPHOCYTES # BLD AUTO: 1.36 10*3/MM3 (ref 0.7–3.1)
LYMPHOCYTES NFR BLD AUTO: 14.2 % (ref 19.6–45.3)
MAGNESIUM SERPL-MCNC: 1.8 MG/DL (ref 1.6–2.6)
MCH RBC QN AUTO: 24.4 PG (ref 26.6–33)
MCHC RBC AUTO-ENTMCNC: 31.9 G/DL (ref 31.5–35.7)
MCV RBC AUTO: 76.5 FL (ref 79–97)
MONOCYTES # BLD AUTO: 0.76 10*3/MM3 (ref 0.1–0.9)
MONOCYTES NFR BLD AUTO: 7.9 % (ref 5–12)
NEUTROPHILS # BLD AUTO: 7.36 10*3/MM3 (ref 1.7–7)
NEUTROPHILS NFR BLD AUTO: 77 % (ref 42.7–76)
NRBC BLD AUTO-RTO: 0 /100 WBC (ref 0–0.2)
PHOSPHATE SERPL-MCNC: 4.2 MG/DL (ref 2.5–4.5)
PLATELET # BLD AUTO: 185 10*3/MM3 (ref 140–450)
PMV BLD AUTO: 9.5 FL (ref 6–12)
POTASSIUM BLD-SCNC: 3.5 MMOL/L (ref 3.5–5.2)
POTASSIUM BLD-SCNC: 5 MMOL/L (ref 3.5–5.2)
RBC # BLD AUTO: 4.51 10*6/MM3 (ref 4.14–5.8)
SODIUM BLD-SCNC: 139 MMOL/L (ref 136–145)
WBC NRBC COR # BLD: 9.57 10*3/MM3 (ref 3.4–10.8)

## 2019-08-20 PROCEDURE — 85025 COMPLETE CBC W/AUTO DIFF WBC: CPT | Performed by: INTERNAL MEDICINE

## 2019-08-20 PROCEDURE — 84132 ASSAY OF SERUM POTASSIUM: CPT | Performed by: INTERNAL MEDICINE

## 2019-08-20 PROCEDURE — 71045 X-RAY EXAM CHEST 1 VIEW: CPT

## 2019-08-20 PROCEDURE — 25010000002 AZITHROMYCIN PER 500 MG: Performed by: INTERNAL MEDICINE

## 2019-08-20 PROCEDURE — 83735 ASSAY OF MAGNESIUM: CPT | Performed by: INTERNAL MEDICINE

## 2019-08-20 PROCEDURE — 99233 SBSQ HOSP IP/OBS HIGH 50: CPT | Performed by: INTERNAL MEDICINE

## 2019-08-20 PROCEDURE — 84100 ASSAY OF PHOSPHORUS: CPT | Performed by: INTERNAL MEDICINE

## 2019-08-20 PROCEDURE — 82962 GLUCOSE BLOOD TEST: CPT

## 2019-08-20 PROCEDURE — 25010000002 CEFTRIAXONE PER 250 MG: Performed by: INTERNAL MEDICINE

## 2019-08-20 PROCEDURE — 83605 ASSAY OF LACTIC ACID: CPT | Performed by: INTERNAL MEDICINE

## 2019-08-20 PROCEDURE — 80048 BASIC METABOLIC PNL TOTAL CA: CPT | Performed by: INTERNAL MEDICINE

## 2019-08-20 RX ORDER — AZITHROMYCIN 250 MG/1
500 TABLET, FILM COATED ORAL ONCE
Status: COMPLETED | OUTPATIENT
Start: 2019-08-21 | End: 2019-08-21

## 2019-08-20 RX ORDER — LISINOPRIL 10 MG/1
10 TABLET ORAL
Status: DISCONTINUED | OUTPATIENT
Start: 2019-08-20 | End: 2019-08-29 | Stop reason: HOSPADM

## 2019-08-20 RX ORDER — DEXTROSE AND SODIUM CHLORIDE 5; .45 G/100ML; G/100ML
75 INJECTION, SOLUTION INTRAVENOUS CONTINUOUS
Status: DISCONTINUED | OUTPATIENT
Start: 2019-08-20 | End: 2019-08-22

## 2019-08-20 RX ORDER — HYDRALAZINE HYDROCHLORIDE 10 MG/1
10 TABLET, FILM COATED ORAL EVERY 8 HOURS PRN
Status: DISCONTINUED | OUTPATIENT
Start: 2019-08-20 | End: 2019-08-29 | Stop reason: HOSPADM

## 2019-08-20 RX ADMIN — FAMOTIDINE 20 MG: 20 TABLET ORAL at 08:44

## 2019-08-20 RX ADMIN — DIAZEPAM 5 MG: 5 TABLET ORAL at 23:46

## 2019-08-20 RX ADMIN — GABAPENTIN 600 MG: 300 CAPSULE ORAL at 21:05

## 2019-08-20 RX ADMIN — DIAZEPAM 5 MG: 5 TABLET ORAL at 00:19

## 2019-08-20 RX ADMIN — CEFTRIAXONE SODIUM 1 G: 1 INJECTION, POWDER, FOR SOLUTION INTRAMUSCULAR; INTRAVENOUS at 08:44

## 2019-08-20 RX ADMIN — INSULIN LISPRO 4 UNITS: 100 INJECTION, SOLUTION INTRAVENOUS; SUBCUTANEOUS at 17:42

## 2019-08-20 RX ADMIN — HYDROCODONE BITARTRATE AND ACETAMINOPHEN 1 TABLET: 5; 325 TABLET ORAL at 08:44

## 2019-08-20 RX ADMIN — SODIUM CHLORIDE, PRESERVATIVE FREE 3 ML: 5 INJECTION INTRAVENOUS at 21:06

## 2019-08-20 RX ADMIN — SODIUM CHLORIDE, PRESERVATIVE FREE 3 ML: 5 INJECTION INTRAVENOUS at 09:00

## 2019-08-20 RX ADMIN — AZITHROMYCIN MONOHYDRATE 500 MG: 500 INJECTION, POWDER, LYOPHILIZED, FOR SOLUTION INTRAVENOUS at 00:19

## 2019-08-20 RX ADMIN — RIVAROXABAN 20 MG: 20 TABLET, FILM COATED ORAL at 17:42

## 2019-08-20 RX ADMIN — DIAZEPAM 5 MG: 5 TABLET ORAL at 17:42

## 2019-08-20 RX ADMIN — DIAZEPAM 5 MG: 5 TABLET ORAL at 05:56

## 2019-08-20 RX ADMIN — INSULIN LISPRO 4 UNITS: 100 INJECTION, SOLUTION INTRAVENOUS; SUBCUTANEOUS at 21:05

## 2019-08-20 RX ADMIN — DEXTROSE 50 % IN WATER (D50W) INTRAVENOUS SYRINGE 25 G: at 06:46

## 2019-08-20 RX ADMIN — HYDROCODONE BITARTRATE AND ACETAMINOPHEN 1 TABLET: 5; 325 TABLET ORAL at 21:05

## 2019-08-20 RX ADMIN — POTASSIUM CHLORIDE 40 MEQ: 750 CAPSULE, EXTENDED RELEASE ORAL at 08:44

## 2019-08-20 RX ADMIN — DIAZEPAM 5 MG: 5 TABLET ORAL at 11:21

## 2019-08-20 RX ADMIN — FAMOTIDINE 20 MG: 20 TABLET ORAL at 21:05

## 2019-08-20 RX ADMIN — LISINOPRIL 10 MG: 10 TABLET ORAL at 11:21

## 2019-08-20 RX ADMIN — SODIUM CHLORIDE, PRESERVATIVE FREE 10 ML: 5 INJECTION INTRAVENOUS at 21:06

## 2019-08-20 RX ADMIN — GABAPENTIN 600 MG: 300 CAPSULE ORAL at 05:56

## 2019-08-20 RX ADMIN — GABAPENTIN 600 MG: 300 CAPSULE ORAL at 13:29

## 2019-08-20 RX ADMIN — MAGNESIUM SULFATE HEPTAHYDRATE 4 G: 40 INJECTION, SOLUTION INTRAVENOUS at 11:22

## 2019-08-20 RX ADMIN — POTASSIUM CHLORIDE 40 MEQ: 750 CAPSULE, EXTENDED RELEASE ORAL at 13:29

## 2019-08-20 RX ADMIN — SODIUM CHLORIDE, PRESERVATIVE FREE 10 ML: 5 INJECTION INTRAVENOUS at 09:00

## 2019-08-20 RX ADMIN — DEXTROSE AND SODIUM CHLORIDE 75 ML/HR: 5; 450 INJECTION, SOLUTION INTRAVENOUS at 12:35

## 2019-08-20 RX ADMIN — DEXTROSE 50 % IN WATER (D50W) INTRAVENOUS SYRINGE 25 G: at 12:00

## 2019-08-20 RX ADMIN — HYDROCODONE BITARTRATE AND ACETAMINOPHEN 1 TABLET: 5; 325 TABLET ORAL at 15:11

## 2019-08-21 PROBLEM — R13.10 DYSPHAGIA: Status: ACTIVE | Noted: 2019-08-21

## 2019-08-21 LAB
ANION GAP SERPL CALCULATED.3IONS-SCNC: 10 MMOL/L (ref 5–15)
BACTERIA SPEC AEROBE CULT: NORMAL
BACTERIA SPEC AEROBE CULT: NORMAL
BASOPHILS # BLD AUTO: 0.02 10*3/MM3 (ref 0–0.2)
BASOPHILS NFR BLD AUTO: 0.3 % (ref 0–1.5)
BUN BLD-MCNC: 9 MG/DL (ref 6–20)
BUN/CREAT SERPL: 21.4 (ref 7–25)
CA-I SERPL ISE-MCNC: 1.21 MMOL/L (ref 1.12–1.32)
CALCIUM SPEC-SCNC: 8.2 MG/DL (ref 8.6–10.5)
CHLORIDE SERPL-SCNC: 100 MMOL/L (ref 98–107)
CO2 SERPL-SCNC: 22 MMOL/L (ref 22–29)
CREAT BLD-MCNC: 0.42 MG/DL (ref 0.76–1.27)
DEPRECATED RDW RBC AUTO: 64.6 FL (ref 37–54)
EOSINOPHIL # BLD AUTO: 0.24 10*3/MM3 (ref 0–0.4)
EOSINOPHIL NFR BLD AUTO: 3.5 % (ref 0.3–6.2)
ERYTHROCYTE [DISTWIDTH] IN BLOOD BY AUTOMATED COUNT: 22.6 % (ref 12.3–15.4)
GFR SERPL CREATININE-BSD FRML MDRD: >150 ML/MIN/1.73
GLUCOSE BLD-MCNC: 277 MG/DL (ref 65–99)
GLUCOSE BLDC GLUCOMTR-MCNC: 170 MG/DL (ref 70–130)
GLUCOSE BLDC GLUCOMTR-MCNC: 178 MG/DL (ref 70–130)
GLUCOSE BLDC GLUCOMTR-MCNC: 306 MG/DL (ref 70–130)
GLUCOSE BLDC GLUCOMTR-MCNC: 361 MG/DL (ref 70–130)
HCT VFR BLD AUTO: 38.5 % (ref 37.5–51)
HGB BLD-MCNC: 11.8 G/DL (ref 13–17.7)
IMM GRANULOCYTES # BLD AUTO: 0.03 10*3/MM3 (ref 0–0.05)
IMM GRANULOCYTES NFR BLD AUTO: 0.4 % (ref 0–0.5)
LYMPHOCYTES # BLD AUTO: 1.91 10*3/MM3 (ref 0.7–3.1)
LYMPHOCYTES NFR BLD AUTO: 27.9 % (ref 19.6–45.3)
MAGNESIUM SERPL-MCNC: 2.3 MG/DL (ref 1.6–2.6)
MCH RBC QN AUTO: 24.3 PG (ref 26.6–33)
MCHC RBC AUTO-ENTMCNC: 30.6 G/DL (ref 31.5–35.7)
MCV RBC AUTO: 79.4 FL (ref 79–97)
MONOCYTES # BLD AUTO: 0.73 10*3/MM3 (ref 0.1–0.9)
MONOCYTES NFR BLD AUTO: 10.7 % (ref 5–12)
NEUTROPHILS # BLD AUTO: 3.91 10*3/MM3 (ref 1.7–7)
NEUTROPHILS NFR BLD AUTO: 57.2 % (ref 42.7–76)
NRBC BLD AUTO-RTO: 0 /100 WBC (ref 0–0.2)
PHOSPHATE SERPL-MCNC: 3.1 MG/DL (ref 2.5–4.5)
PLATELET # BLD AUTO: 228 10*3/MM3 (ref 140–450)
PMV BLD AUTO: 10 FL (ref 6–12)
POTASSIUM BLD-SCNC: 5 MMOL/L (ref 3.5–5.2)
RBC # BLD AUTO: 4.85 10*6/MM3 (ref 4.14–5.8)
SODIUM BLD-SCNC: 132 MMOL/L (ref 136–145)
WBC NRBC COR # BLD: 6.84 10*3/MM3 (ref 3.4–10.8)

## 2019-08-21 PROCEDURE — 63710000001 INSULIN REGULAR HUMAN PER 5 UNITS: Performed by: INTERNAL MEDICINE

## 2019-08-21 PROCEDURE — 84100 ASSAY OF PHOSPHORUS: CPT | Performed by: INTERNAL MEDICINE

## 2019-08-21 PROCEDURE — 82330 ASSAY OF CALCIUM: CPT | Performed by: INTERNAL MEDICINE

## 2019-08-21 PROCEDURE — 85025 COMPLETE CBC W/AUTO DIFF WBC: CPT | Performed by: INTERNAL MEDICINE

## 2019-08-21 PROCEDURE — 25010000002 CEFTRIAXONE PER 250 MG: Performed by: INTERNAL MEDICINE

## 2019-08-21 PROCEDURE — 99233 SBSQ HOSP IP/OBS HIGH 50: CPT | Performed by: INTERNAL MEDICINE

## 2019-08-21 PROCEDURE — 83735 ASSAY OF MAGNESIUM: CPT | Performed by: INTERNAL MEDICINE

## 2019-08-21 PROCEDURE — 92610 EVALUATE SWALLOWING FUNCTION: CPT

## 2019-08-21 PROCEDURE — 82962 GLUCOSE BLOOD TEST: CPT

## 2019-08-21 PROCEDURE — 80048 BASIC METABOLIC PNL TOTAL CA: CPT | Performed by: INTERNAL MEDICINE

## 2019-08-21 RX ADMIN — RIVAROXABAN 20 MG: 20 TABLET, FILM COATED ORAL at 17:06

## 2019-08-21 RX ADMIN — INSULIN LISPRO 2 UNITS: 100 INJECTION, SOLUTION INTRAVENOUS; SUBCUTANEOUS at 17:06

## 2019-08-21 RX ADMIN — FAMOTIDINE 20 MG: 20 TABLET ORAL at 20:06

## 2019-08-21 RX ADMIN — SODIUM CHLORIDE, PRESERVATIVE FREE 10 ML: 5 INJECTION INTRAVENOUS at 20:07

## 2019-08-21 RX ADMIN — GABAPENTIN 600 MG: 300 CAPSULE ORAL at 05:41

## 2019-08-21 RX ADMIN — HYDROCODONE BITARTRATE AND ACETAMINOPHEN 1 TABLET: 5; 325 TABLET ORAL at 01:33

## 2019-08-21 RX ADMIN — INSULIN LISPRO 2 UNITS: 100 INJECTION, SOLUTION INTRAVENOUS; SUBCUTANEOUS at 20:06

## 2019-08-21 RX ADMIN — DEXTROSE AND SODIUM CHLORIDE 75 ML/HR: 5; 450 INJECTION, SOLUTION INTRAVENOUS at 14:13

## 2019-08-21 RX ADMIN — CEFTRIAXONE SODIUM 1 G: 1 INJECTION, POWDER, FOR SOLUTION INTRAMUSCULAR; INTRAVENOUS at 10:16

## 2019-08-21 RX ADMIN — FAMOTIDINE 20 MG: 20 TABLET ORAL at 08:05

## 2019-08-21 RX ADMIN — DEXTROSE AND SODIUM CHLORIDE 75 ML/HR: 5; 450 INJECTION, SOLUTION INTRAVENOUS at 01:33

## 2019-08-21 RX ADMIN — SODIUM CHLORIDE, PRESERVATIVE FREE 3 ML: 5 INJECTION INTRAVENOUS at 20:07

## 2019-08-21 RX ADMIN — GABAPENTIN 600 MG: 300 CAPSULE ORAL at 14:13

## 2019-08-21 RX ADMIN — INSULIN LISPRO 8 UNITS: 100 INJECTION, SOLUTION INTRAVENOUS; SUBCUTANEOUS at 08:04

## 2019-08-21 RX ADMIN — INSULIN LISPRO 7 UNITS: 100 INJECTION, SOLUTION INTRAVENOUS; SUBCUTANEOUS at 12:02

## 2019-08-21 RX ADMIN — GABAPENTIN 600 MG: 300 CAPSULE ORAL at 21:53

## 2019-08-21 RX ADMIN — HYDROCODONE BITARTRATE AND ACETAMINOPHEN 1 TABLET: 5; 325 TABLET ORAL at 22:50

## 2019-08-21 RX ADMIN — LISINOPRIL 10 MG: 10 TABLET ORAL at 08:05

## 2019-08-21 RX ADMIN — INSULIN HUMAN 5 UNITS: 100 INJECTION, SOLUTION PARENTERAL at 17:06

## 2019-08-21 RX ADMIN — AZITHROMYCIN 500 MG: 250 TABLET, FILM COATED ORAL at 08:05

## 2019-08-21 RX ADMIN — INSULIN HUMAN 5 UNITS: 100 INJECTION, SOLUTION PARENTERAL at 12:02

## 2019-08-21 RX ADMIN — DIAZEPAM 5 MG: 5 TABLET ORAL at 05:41

## 2019-08-22 ENCOUNTER — APPOINTMENT (OUTPATIENT)
Dept: GENERAL RADIOLOGY | Facility: HOSPITAL | Age: 54
End: 2019-08-22

## 2019-08-22 LAB
ANION GAP SERPL CALCULATED.3IONS-SCNC: 13 MMOL/L (ref 5–15)
BASOPHILS # BLD AUTO: 0.03 10*3/MM3 (ref 0–0.2)
BASOPHILS NFR BLD AUTO: 0.4 % (ref 0–1.5)
BUN BLD-MCNC: 14 MG/DL (ref 6–20)
BUN/CREAT SERPL: 21.9 (ref 7–25)
CALCIUM SPEC-SCNC: 9 MG/DL (ref 8.6–10.5)
CHLORIDE SERPL-SCNC: 94 MMOL/L (ref 98–107)
CO2 SERPL-SCNC: 26 MMOL/L (ref 22–29)
CREAT BLD-MCNC: 0.64 MG/DL (ref 0.76–1.27)
DEPRECATED RDW RBC AUTO: 61.4 FL (ref 37–54)
EOSINOPHIL # BLD AUTO: 0.12 10*3/MM3 (ref 0–0.4)
EOSINOPHIL NFR BLD AUTO: 1.5 % (ref 0.3–6.2)
ERYTHROCYTE [DISTWIDTH] IN BLOOD BY AUTOMATED COUNT: 22 % (ref 12.3–15.4)
GFR SERPL CREATININE-BSD FRML MDRD: 129 ML/MIN/1.73
GLUCOSE BLD-MCNC: 460 MG/DL (ref 65–99)
GLUCOSE BLDC GLUCOMTR-MCNC: 114 MG/DL (ref 70–130)
GLUCOSE BLDC GLUCOMTR-MCNC: 249 MG/DL (ref 70–130)
GLUCOSE BLDC GLUCOMTR-MCNC: 266 MG/DL (ref 70–130)
GLUCOSE BLDC GLUCOMTR-MCNC: 276 MG/DL (ref 70–130)
GLUCOSE BLDC GLUCOMTR-MCNC: 441 MG/DL (ref 70–130)
HCT VFR BLD AUTO: 39 % (ref 37.5–51)
HGB BLD-MCNC: 12.4 G/DL (ref 13–17.7)
IMM GRANULOCYTES # BLD AUTO: 0.03 10*3/MM3 (ref 0–0.05)
IMM GRANULOCYTES NFR BLD AUTO: 0.4 % (ref 0–0.5)
LYMPHOCYTES # BLD AUTO: 2.06 10*3/MM3 (ref 0.7–3.1)
LYMPHOCYTES NFR BLD AUTO: 26.4 % (ref 19.6–45.3)
MAGNESIUM SERPL-MCNC: 1.8 MG/DL (ref 1.6–2.6)
MCH RBC QN AUTO: 24.6 PG (ref 26.6–33)
MCHC RBC AUTO-ENTMCNC: 31.8 G/DL (ref 31.5–35.7)
MCV RBC AUTO: 77.4 FL (ref 79–97)
MONOCYTES # BLD AUTO: 0.75 10*3/MM3 (ref 0.1–0.9)
MONOCYTES NFR BLD AUTO: 9.6 % (ref 5–12)
NEUTROPHILS # BLD AUTO: 4.8 10*3/MM3 (ref 1.7–7)
NEUTROPHILS NFR BLD AUTO: 61.7 % (ref 42.7–76)
NRBC BLD AUTO-RTO: 0 /100 WBC (ref 0–0.2)
PHOSPHATE SERPL-MCNC: 3.3 MG/DL (ref 2.5–4.5)
PLATELET # BLD AUTO: 257 10*3/MM3 (ref 140–450)
PMV BLD AUTO: 10.4 FL (ref 6–12)
POTASSIUM BLD-SCNC: 5.4 MMOL/L (ref 3.5–5.2)
RBC # BLD AUTO: 5.04 10*6/MM3 (ref 4.14–5.8)
SODIUM BLD-SCNC: 133 MMOL/L (ref 136–145)
WBC NRBC COR # BLD: 7.79 10*3/MM3 (ref 3.4–10.8)

## 2019-08-22 PROCEDURE — 82962 GLUCOSE BLOOD TEST: CPT

## 2019-08-22 PROCEDURE — 83735 ASSAY OF MAGNESIUM: CPT | Performed by: INTERNAL MEDICINE

## 2019-08-22 PROCEDURE — 85025 COMPLETE CBC W/AUTO DIFF WBC: CPT | Performed by: INTERNAL MEDICINE

## 2019-08-22 PROCEDURE — 99233 SBSQ HOSP IP/OBS HIGH 50: CPT | Performed by: HOSPITALIST

## 2019-08-22 PROCEDURE — 92610 EVALUATE SWALLOWING FUNCTION: CPT

## 2019-08-22 PROCEDURE — 63710000001 INSULIN REGULAR HUMAN PER 5 UNITS: Performed by: HOSPITALIST

## 2019-08-22 PROCEDURE — 73560 X-RAY EXAM OF KNEE 1 OR 2: CPT

## 2019-08-22 PROCEDURE — 97535 SELF CARE MNGMENT TRAINING: CPT

## 2019-08-22 PROCEDURE — 97166 OT EVAL MOD COMPLEX 45 MIN: CPT

## 2019-08-22 PROCEDURE — 63710000001 INSULIN DETEMIR PER 5 UNITS: Performed by: HOSPITALIST

## 2019-08-22 PROCEDURE — 97163 PT EVAL HIGH COMPLEX 45 MIN: CPT

## 2019-08-22 PROCEDURE — 80048 BASIC METABOLIC PNL TOTAL CA: CPT | Performed by: INTERNAL MEDICINE

## 2019-08-22 PROCEDURE — 25010000002 CEFTRIAXONE PER 250 MG: Performed by: INTERNAL MEDICINE

## 2019-08-22 PROCEDURE — 84100 ASSAY OF PHOSPHORUS: CPT | Performed by: INTERNAL MEDICINE

## 2019-08-22 RX ORDER — HYDROCODONE BITARTRATE AND ACETAMINOPHEN 5; 325 MG/1; MG/1
2 TABLET ORAL EVERY 6 HOURS PRN
Status: DISCONTINUED | OUTPATIENT
Start: 2019-08-22 | End: 2019-08-29 | Stop reason: HOSPADM

## 2019-08-22 RX ORDER — ONDANSETRON 2 MG/ML
4 INJECTION INTRAMUSCULAR; INTRAVENOUS EVERY 6 HOURS PRN
Status: DISCONTINUED | OUTPATIENT
Start: 2019-08-22 | End: 2019-08-29 | Stop reason: HOSPADM

## 2019-08-22 RX ADMIN — INSULIN LISPRO 4 UNITS: 100 INJECTION, SOLUTION INTRAVENOUS; SUBCUTANEOUS at 12:21

## 2019-08-22 RX ADMIN — INSULIN HUMAN 7 UNITS: 100 INJECTION, SOLUTION PARENTERAL at 08:14

## 2019-08-22 RX ADMIN — INSULIN LISPRO 6 UNITS: 100 INJECTION, SOLUTION INTRAVENOUS; SUBCUTANEOUS at 20:56

## 2019-08-22 RX ADMIN — GABAPENTIN 600 MG: 300 CAPSULE ORAL at 21:55

## 2019-08-22 RX ADMIN — FAMOTIDINE 20 MG: 20 TABLET ORAL at 20:56

## 2019-08-22 RX ADMIN — HYDROCODONE BITARTRATE AND ACETAMINOPHEN 2 TABLET: 5; 325 TABLET ORAL at 09:23

## 2019-08-22 RX ADMIN — INSULIN DETEMIR 6 UNITS: 100 INJECTION, SOLUTION SUBCUTANEOUS at 09:23

## 2019-08-22 RX ADMIN — RIVAROXABAN 20 MG: 20 TABLET, FILM COATED ORAL at 18:37

## 2019-08-22 RX ADMIN — INSULIN LISPRO 9 UNITS: 100 INJECTION, SOLUTION INTRAVENOUS; SUBCUTANEOUS at 08:09

## 2019-08-22 RX ADMIN — INSULIN HUMAN 7 UNITS: 100 INJECTION, SOLUTION PARENTERAL at 12:23

## 2019-08-22 RX ADMIN — SODIUM CHLORIDE, PRESERVATIVE FREE 10 ML: 5 INJECTION INTRAVENOUS at 09:32

## 2019-08-22 RX ADMIN — GABAPENTIN 600 MG: 300 CAPSULE ORAL at 13:22

## 2019-08-22 RX ADMIN — SODIUM CHLORIDE, PRESERVATIVE FREE 3 ML: 5 INJECTION INTRAVENOUS at 09:33

## 2019-08-22 RX ADMIN — CEFTRIAXONE SODIUM 1 G: 1 INJECTION, POWDER, FOR SOLUTION INTRAMUSCULAR; INTRAVENOUS at 09:21

## 2019-08-22 RX ADMIN — GABAPENTIN 600 MG: 300 CAPSULE ORAL at 05:31

## 2019-08-22 RX ADMIN — HYDROCODONE BITARTRATE AND ACETAMINOPHEN 1 TABLET: 5; 325 TABLET ORAL at 03:03

## 2019-08-22 RX ADMIN — FAMOTIDINE 20 MG: 20 TABLET ORAL at 09:25

## 2019-08-22 RX ADMIN — LISINOPRIL 10 MG: 10 TABLET ORAL at 09:28

## 2019-08-23 LAB
ANION GAP SERPL CALCULATED.3IONS-SCNC: 13 MMOL/L (ref 5–15)
BUN BLD-MCNC: 20 MG/DL (ref 6–20)
BUN/CREAT SERPL: 36.4 (ref 7–25)
CALCIUM SPEC-SCNC: 9.1 MG/DL (ref 8.6–10.5)
CHLORIDE SERPL-SCNC: 94 MMOL/L (ref 98–107)
CO2 SERPL-SCNC: 27 MMOL/L (ref 22–29)
CREAT BLD-MCNC: 0.55 MG/DL (ref 0.76–1.27)
DEPRECATED RDW RBC AUTO: 60.2 FL (ref 37–54)
ERYTHROCYTE [DISTWIDTH] IN BLOOD BY AUTOMATED COUNT: 21.3 % (ref 12.3–15.4)
GFR SERPL CREATININE-BSD FRML MDRD: >150 ML/MIN/1.73
GLUCOSE BLD-MCNC: 378 MG/DL (ref 65–99)
GLUCOSE BLDC GLUCOMTR-MCNC: 209 MG/DL (ref 70–130)
GLUCOSE BLDC GLUCOMTR-MCNC: 225 MG/DL (ref 70–130)
GLUCOSE BLDC GLUCOMTR-MCNC: 369 MG/DL (ref 70–130)
GLUCOSE BLDC GLUCOMTR-MCNC: 89 MG/DL (ref 70–130)
HCT VFR BLD AUTO: 37.6 % (ref 37.5–51)
HGB BLD-MCNC: 11.9 G/DL (ref 13–17.7)
MAGNESIUM SERPL-MCNC: 1.8 MG/DL (ref 1.6–2.6)
MCH RBC QN AUTO: 24.5 PG (ref 26.6–33)
MCHC RBC AUTO-ENTMCNC: 31.6 G/DL (ref 31.5–35.7)
MCV RBC AUTO: 77.5 FL (ref 79–97)
PHOSPHATE SERPL-MCNC: 3 MG/DL (ref 2.5–4.5)
PLATELET # BLD AUTO: 294 10*3/MM3 (ref 140–450)
PMV BLD AUTO: 10.4 FL (ref 6–12)
POTASSIUM BLD-SCNC: 4.6 MMOL/L (ref 3.5–5.2)
RBC # BLD AUTO: 4.85 10*6/MM3 (ref 4.14–5.8)
SODIUM BLD-SCNC: 134 MMOL/L (ref 136–145)
WBC NRBC COR # BLD: 6.39 10*3/MM3 (ref 3.4–10.8)

## 2019-08-23 PROCEDURE — 83735 ASSAY OF MAGNESIUM: CPT | Performed by: INTERNAL MEDICINE

## 2019-08-23 PROCEDURE — 85027 COMPLETE CBC AUTOMATED: CPT | Performed by: HOSPITALIST

## 2019-08-23 PROCEDURE — 63710000001 INSULIN DETEMIR PER 5 UNITS: Performed by: HOSPITALIST

## 2019-08-23 PROCEDURE — 84100 ASSAY OF PHOSPHORUS: CPT | Performed by: INTERNAL MEDICINE

## 2019-08-23 PROCEDURE — 99233 SBSQ HOSP IP/OBS HIGH 50: CPT | Performed by: HOSPITALIST

## 2019-08-23 PROCEDURE — 82962 GLUCOSE BLOOD TEST: CPT

## 2019-08-23 PROCEDURE — 97110 THERAPEUTIC EXERCISES: CPT

## 2019-08-23 PROCEDURE — 80048 BASIC METABOLIC PNL TOTAL CA: CPT | Performed by: INTERNAL MEDICINE

## 2019-08-23 RX ORDER — BISACODYL 5 MG/1
10 TABLET, DELAYED RELEASE ORAL DAILY PRN
Status: DISCONTINUED | OUTPATIENT
Start: 2019-08-23 | End: 2019-08-29 | Stop reason: HOSPADM

## 2019-08-23 RX ORDER — DOCUSATE SODIUM 100 MG/1
100 CAPSULE, LIQUID FILLED ORAL 2 TIMES DAILY
Status: DISCONTINUED | OUTPATIENT
Start: 2019-08-23 | End: 2019-08-29 | Stop reason: HOSPADM

## 2019-08-23 RX ADMIN — INSULIN DETEMIR 4 UNITS: 100 INJECTION, SOLUTION SUBCUTANEOUS at 13:24

## 2019-08-23 RX ADMIN — FAMOTIDINE 20 MG: 20 TABLET ORAL at 21:28

## 2019-08-23 RX ADMIN — INSULIN HUMAN 7 UNITS: 100 INJECTION, SOLUTION PARENTERAL at 13:29

## 2019-08-23 RX ADMIN — INSULIN LISPRO 4 UNITS: 100 INJECTION, SOLUTION INTRAVENOUS; SUBCUTANEOUS at 13:30

## 2019-08-23 RX ADMIN — DOCUSATE SODIUM 100 MG: 100 CAPSULE, LIQUID FILLED ORAL at 09:22

## 2019-08-23 RX ADMIN — SODIUM CHLORIDE, PRESERVATIVE FREE 3 ML: 5 INJECTION INTRAVENOUS at 09:15

## 2019-08-23 RX ADMIN — RIVAROXABAN 20 MG: 20 TABLET, FILM COATED ORAL at 17:35

## 2019-08-23 RX ADMIN — FAMOTIDINE 20 MG: 20 TABLET ORAL at 09:14

## 2019-08-23 RX ADMIN — DOCUSATE SODIUM 100 MG: 100 CAPSULE, LIQUID FILLED ORAL at 21:28

## 2019-08-23 RX ADMIN — INSULIN HUMAN 7 UNITS: 100 INJECTION, SOLUTION PARENTERAL at 09:14

## 2019-08-23 RX ADMIN — SODIUM CHLORIDE, PRESERVATIVE FREE 10 ML: 5 INJECTION INTRAVENOUS at 21:28

## 2019-08-23 RX ADMIN — HYDROCODONE BITARTRATE AND ACETAMINOPHEN 2 TABLET: 5; 325 TABLET ORAL at 17:35

## 2019-08-23 RX ADMIN — GABAPENTIN 600 MG: 300 CAPSULE ORAL at 21:28

## 2019-08-23 RX ADMIN — INSULIN LISPRO 4 UNITS: 100 INJECTION, SOLUTION INTRAVENOUS; SUBCUTANEOUS at 21:29

## 2019-08-23 RX ADMIN — LISINOPRIL 10 MG: 10 TABLET ORAL at 09:00

## 2019-08-23 RX ADMIN — HYDROCODONE BITARTRATE AND ACETAMINOPHEN 2 TABLET: 5; 325 TABLET ORAL at 10:43

## 2019-08-23 RX ADMIN — HYDROCODONE BITARTRATE AND ACETAMINOPHEN 2 TABLET: 5; 325 TABLET ORAL at 23:27

## 2019-08-23 RX ADMIN — INSULIN HUMAN 7 UNITS: 100 INJECTION, SOLUTION PARENTERAL at 17:40

## 2019-08-23 RX ADMIN — INSULIN LISPRO 8 UNITS: 100 INJECTION, SOLUTION INTRAVENOUS; SUBCUTANEOUS at 09:13

## 2019-08-23 RX ADMIN — GABAPENTIN 600 MG: 300 CAPSULE ORAL at 14:39

## 2019-08-23 RX ADMIN — HYDROCODONE BITARTRATE AND ACETAMINOPHEN 2 TABLET: 5; 325 TABLET ORAL at 01:53

## 2019-08-23 RX ADMIN — GABAPENTIN 600 MG: 300 CAPSULE ORAL at 05:25

## 2019-08-23 RX ADMIN — SODIUM CHLORIDE, PRESERVATIVE FREE 10 ML: 5 INJECTION INTRAVENOUS at 09:14

## 2019-08-24 ENCOUNTER — APPOINTMENT (OUTPATIENT)
Dept: MRI IMAGING | Facility: HOSPITAL | Age: 54
End: 2019-08-24

## 2019-08-24 LAB
ANION GAP SERPL CALCULATED.3IONS-SCNC: 13 MMOL/L (ref 5–15)
BILIRUB UR QL STRIP: NEGATIVE
BUN BLD-MCNC: 21 MG/DL (ref 6–20)
BUN/CREAT SERPL: 42.9 (ref 7–25)
CALCIUM SPEC-SCNC: 8.8 MG/DL (ref 8.6–10.5)
CHLORIDE SERPL-SCNC: 96 MMOL/L (ref 98–107)
CLARITY UR: CLEAR
CO2 SERPL-SCNC: 28 MMOL/L (ref 22–29)
COLOR UR: YELLOW
CREAT BLD-MCNC: 0.49 MG/DL (ref 0.76–1.27)
GFR SERPL CREATININE-BSD FRML MDRD: >150 ML/MIN/1.73
GLUCOSE BLD-MCNC: 190 MG/DL (ref 65–99)
GLUCOSE BLDC GLUCOMTR-MCNC: 290 MG/DL (ref 70–130)
GLUCOSE BLDC GLUCOMTR-MCNC: 348 MG/DL (ref 70–130)
GLUCOSE BLDC GLUCOMTR-MCNC: 387 MG/DL (ref 70–130)
GLUCOSE BLDC GLUCOMTR-MCNC: 519 MG/DL (ref 70–130)
GLUCOSE BLDC GLUCOMTR-MCNC: 98 MG/DL (ref 70–130)
GLUCOSE UR STRIP-MCNC: ABNORMAL MG/DL
HGB UR QL STRIP.AUTO: NEGATIVE
KETONES UR QL STRIP: NEGATIVE
LEUKOCYTE ESTERASE UR QL STRIP.AUTO: NEGATIVE
MAGNESIUM SERPL-MCNC: 2 MG/DL (ref 1.6–2.6)
NITRITE UR QL STRIP: NEGATIVE
PH UR STRIP.AUTO: 6 [PH] (ref 5–8)
PHOSPHATE SERPL-MCNC: 4 MG/DL (ref 2.5–4.5)
POTASSIUM BLD-SCNC: 4.1 MMOL/L (ref 3.5–5.2)
PROT UR QL STRIP: NEGATIVE
SODIUM BLD-SCNC: 137 MMOL/L (ref 136–145)
SP GR UR STRIP: 1.02 (ref 1–1.03)
UROBILINOGEN UR QL STRIP: ABNORMAL

## 2019-08-24 PROCEDURE — 84100 ASSAY OF PHOSPHORUS: CPT | Performed by: INTERNAL MEDICINE

## 2019-08-24 PROCEDURE — 83735 ASSAY OF MAGNESIUM: CPT | Performed by: INTERNAL MEDICINE

## 2019-08-24 PROCEDURE — 73721 MRI JNT OF LWR EXTRE W/O DYE: CPT

## 2019-08-24 PROCEDURE — 80048 BASIC METABOLIC PNL TOTAL CA: CPT | Performed by: INTERNAL MEDICINE

## 2019-08-24 PROCEDURE — 82962 GLUCOSE BLOOD TEST: CPT

## 2019-08-24 PROCEDURE — 99233 SBSQ HOSP IP/OBS HIGH 50: CPT | Performed by: HOSPITALIST

## 2019-08-24 PROCEDURE — 81003 URINALYSIS AUTO W/O SCOPE: CPT | Performed by: HOSPITALIST

## 2019-08-24 PROCEDURE — 63710000001 INSULIN DETEMIR PER 5 UNITS: Performed by: HOSPITALIST

## 2019-08-24 RX ORDER — POLYETHYLENE GLYCOL 3350 17 G
2 POWDER IN PACKET (EA) ORAL
Status: DISCONTINUED | OUTPATIENT
Start: 2019-08-24 | End: 2019-08-29 | Stop reason: HOSPADM

## 2019-08-24 RX ORDER — NICOTINE 21 MG/24HR
1 PATCH, TRANSDERMAL 24 HOURS TRANSDERMAL
Status: DISCONTINUED | OUTPATIENT
Start: 2019-08-24 | End: 2019-08-29 | Stop reason: HOSPADM

## 2019-08-24 RX ORDER — SODIUM CHLORIDE 9 MG/ML
125 INJECTION, SOLUTION INTRAVENOUS CONTINUOUS
Status: DISCONTINUED | OUTPATIENT
Start: 2019-08-24 | End: 2019-08-25

## 2019-08-24 RX ADMIN — DOCUSATE SODIUM 100 MG: 100 CAPSULE, LIQUID FILLED ORAL at 21:29

## 2019-08-24 RX ADMIN — HYDROCODONE BITARTRATE AND ACETAMINOPHEN 2 TABLET: 5; 325 TABLET ORAL at 05:25

## 2019-08-24 RX ADMIN — INSULIN DETEMIR 4 UNITS: 100 INJECTION, SOLUTION SUBCUTANEOUS at 10:16

## 2019-08-24 RX ADMIN — POLYETHYLENE GLYCOL 3350 17 G: 17 POWDER, FOR SOLUTION ORAL at 11:26

## 2019-08-24 RX ADMIN — INSULIN LISPRO 8 UNITS: 100 INJECTION, SOLUTION INTRAVENOUS; SUBCUTANEOUS at 10:15

## 2019-08-24 RX ADMIN — RIVAROXABAN 20 MG: 20 TABLET, FILM COATED ORAL at 17:09

## 2019-08-24 RX ADMIN — GABAPENTIN 600 MG: 300 CAPSULE ORAL at 05:25

## 2019-08-24 RX ADMIN — SODIUM CHLORIDE 125 ML/HR: 9 INJECTION, SOLUTION INTRAVENOUS at 16:28

## 2019-08-24 RX ADMIN — POLYETHYLENE GLYCOL 3350 17 G: 17 POWDER, FOR SOLUTION ORAL at 21:29

## 2019-08-24 RX ADMIN — INSULIN LISPRO 6 UNITS: 100 INJECTION, SOLUTION INTRAVENOUS; SUBCUTANEOUS at 17:11

## 2019-08-24 RX ADMIN — HYDROCODONE BITARTRATE AND ACETAMINOPHEN 2 TABLET: 5; 325 TABLET ORAL at 17:08

## 2019-08-24 RX ADMIN — INSULIN HUMAN 7 UNITS: 100 INJECTION, SOLUTION PARENTERAL at 12:48

## 2019-08-24 RX ADMIN — NICOTINE 1 PATCH: 21 PATCH, EXTENDED RELEASE TRANSDERMAL at 16:23

## 2019-08-24 RX ADMIN — INSULIN LISPRO 7 UNITS: 100 INJECTION, SOLUTION INTRAVENOUS; SUBCUTANEOUS at 12:45

## 2019-08-24 RX ADMIN — LISINOPRIL 10 MG: 10 TABLET ORAL at 11:27

## 2019-08-24 RX ADMIN — SODIUM CHLORIDE, PRESERVATIVE FREE 10 ML: 5 INJECTION INTRAVENOUS at 16:29

## 2019-08-24 RX ADMIN — FAMOTIDINE 20 MG: 20 TABLET ORAL at 21:29

## 2019-08-24 RX ADMIN — GABAPENTIN 600 MG: 300 CAPSULE ORAL at 21:29

## 2019-08-24 RX ADMIN — HYDROCODONE BITARTRATE AND ACETAMINOPHEN 2 TABLET: 5; 325 TABLET ORAL at 11:27

## 2019-08-24 RX ADMIN — FAMOTIDINE 20 MG: 20 TABLET ORAL at 11:29

## 2019-08-24 RX ADMIN — GABAPENTIN 600 MG: 300 CAPSULE ORAL at 14:42

## 2019-08-24 RX ADMIN — DOCUSATE SODIUM 100 MG: 100 CAPSULE, LIQUID FILLED ORAL at 11:27

## 2019-08-25 LAB
ANION GAP SERPL CALCULATED.3IONS-SCNC: 9 MMOL/L (ref 5–15)
BUN BLD-MCNC: 21 MG/DL (ref 6–20)
BUN/CREAT SERPL: 45.7 (ref 7–25)
CALCIUM SPEC-SCNC: 6.9 MG/DL (ref 8.6–10.5)
CHLORIDE SERPL-SCNC: 106 MMOL/L (ref 98–107)
CO2 SERPL-SCNC: 23 MMOL/L (ref 22–29)
CREAT BLD-MCNC: 0.46 MG/DL (ref 0.76–1.27)
GFR SERPL CREATININE-BSD FRML MDRD: >150 ML/MIN/1.73
GLUCOSE BLD-MCNC: 257 MG/DL (ref 65–99)
GLUCOSE BLDC GLUCOMTR-MCNC: 215 MG/DL (ref 70–130)
GLUCOSE BLDC GLUCOMTR-MCNC: 225 MG/DL (ref 70–130)
GLUCOSE BLDC GLUCOMTR-MCNC: 326 MG/DL (ref 70–130)
GLUCOSE BLDC GLUCOMTR-MCNC: 382 MG/DL (ref 70–130)
GLUCOSE BLDC GLUCOMTR-MCNC: 41 MG/DL (ref 70–130)
GLUCOSE BLDC GLUCOMTR-MCNC: 48 MG/DL (ref 70–130)
GLUCOSE BLDC GLUCOMTR-MCNC: 81 MG/DL (ref 70–130)
GLUCOSE BLDC GLUCOMTR-MCNC: 86 MG/DL (ref 70–130)
GLUCOSE BLDC GLUCOMTR-MCNC: 96 MG/DL (ref 70–130)
MAGNESIUM SERPL-MCNC: 1.5 MG/DL (ref 1.6–2.6)
PHOSPHATE SERPL-MCNC: 2.6 MG/DL (ref 2.5–4.5)
POTASSIUM BLD-SCNC: 3.8 MMOL/L (ref 3.5–5.2)
SODIUM BLD-SCNC: 138 MMOL/L (ref 136–145)

## 2019-08-25 PROCEDURE — 25010000002 ONDANSETRON PER 1 MG: Performed by: HOSPITALIST

## 2019-08-25 PROCEDURE — 25010000002 KETOROLAC TROMETHAMINE PER 15 MG: Performed by: PHYSICIAN ASSISTANT

## 2019-08-25 PROCEDURE — 97116 GAIT TRAINING THERAPY: CPT

## 2019-08-25 PROCEDURE — 80048 BASIC METABOLIC PNL TOTAL CA: CPT | Performed by: INTERNAL MEDICINE

## 2019-08-25 PROCEDURE — 63710000001 INSULIN DETEMIR PER 5 UNITS: Performed by: HOSPITALIST

## 2019-08-25 PROCEDURE — 84100 ASSAY OF PHOSPHORUS: CPT | Performed by: INTERNAL MEDICINE

## 2019-08-25 PROCEDURE — 83735 ASSAY OF MAGNESIUM: CPT | Performed by: INTERNAL MEDICINE

## 2019-08-25 PROCEDURE — 82962 GLUCOSE BLOOD TEST: CPT

## 2019-08-25 PROCEDURE — 99233 SBSQ HOSP IP/OBS HIGH 50: CPT | Performed by: INTERNAL MEDICINE

## 2019-08-25 PROCEDURE — 25010000002 MAGNESIUM SULFATE 2 GM/50ML SOLUTION

## 2019-08-25 RX ORDER — MORPHINE SULFATE 2 MG/ML
2 INJECTION, SOLUTION INTRAMUSCULAR; INTRAVENOUS 2 TIMES DAILY PRN
Status: DISCONTINUED | OUTPATIENT
Start: 2019-08-25 | End: 2019-08-29 | Stop reason: HOSPADM

## 2019-08-25 RX ORDER — KETOROLAC TROMETHAMINE 30 MG/ML
30 INJECTION, SOLUTION INTRAMUSCULAR; INTRAVENOUS ONCE AS NEEDED
Status: COMPLETED | OUTPATIENT
Start: 2019-08-25 | End: 2019-08-25

## 2019-08-25 RX ORDER — MORPHINE SULFATE 2 MG/ML
6 INJECTION, SOLUTION INTRAMUSCULAR; INTRAVENOUS ONCE AS NEEDED
Status: COMPLETED | OUTPATIENT
Start: 2019-08-25 | End: 2019-08-27

## 2019-08-25 RX ORDER — GABAPENTIN 300 MG/1
600 CAPSULE ORAL
Status: DISCONTINUED | OUTPATIENT
Start: 2019-08-25 | End: 2019-08-26

## 2019-08-25 RX ADMIN — LISINOPRIL 10 MG: 10 TABLET ORAL at 09:12

## 2019-08-25 RX ADMIN — MAGNESIUM SULFATE HEPTAHYDRATE 2 G: 40 INJECTION, SOLUTION INTRAVENOUS at 15:43

## 2019-08-25 RX ADMIN — HYDROCODONE BITARTRATE AND ACETAMINOPHEN 2 TABLET: 5; 325 TABLET ORAL at 00:49

## 2019-08-25 RX ADMIN — INSULIN DETEMIR 12 UNITS: 100 INJECTION, SOLUTION SUBCUTANEOUS at 09:16

## 2019-08-25 RX ADMIN — HYDROCODONE BITARTRATE AND ACETAMINOPHEN 2 TABLET: 5; 325 TABLET ORAL at 09:12

## 2019-08-25 RX ADMIN — SODIUM CHLORIDE, PRESERVATIVE FREE 10 ML: 5 INJECTION INTRAVENOUS at 21:59

## 2019-08-25 RX ADMIN — DOCUSATE SODIUM 100 MG: 100 CAPSULE, LIQUID FILLED ORAL at 21:59

## 2019-08-25 RX ADMIN — DOCUSATE SODIUM 100 MG: 100 CAPSULE, LIQUID FILLED ORAL at 09:12

## 2019-08-25 RX ADMIN — HYDROCODONE BITARTRATE AND ACETAMINOPHEN 2 TABLET: 5; 325 TABLET ORAL at 17:58

## 2019-08-25 RX ADMIN — GABAPENTIN 600 MG: 300 CAPSULE ORAL at 19:40

## 2019-08-25 RX ADMIN — RIVAROXABAN 20 MG: 20 TABLET, FILM COATED ORAL at 17:57

## 2019-08-25 RX ADMIN — NICOTINE 1 PATCH: 21 PATCH, EXTENDED RELEASE TRANSDERMAL at 09:14

## 2019-08-25 RX ADMIN — KETOROLAC TROMETHAMINE 30 MG: 30 INJECTION, SOLUTION INTRAMUSCULAR at 00:49

## 2019-08-25 RX ADMIN — GABAPENTIN 600 MG: 300 CAPSULE ORAL at 21:59

## 2019-08-25 RX ADMIN — MAGNESIUM SULFATE HEPTAHYDRATE 2 G: 40 INJECTION, SOLUTION INTRAVENOUS at 17:58

## 2019-08-25 RX ADMIN — POLYETHYLENE GLYCOL 3350 17 G: 17 POWDER, FOR SOLUTION ORAL at 21:59

## 2019-08-25 RX ADMIN — ONDANSETRON 4 MG: 2 INJECTION INTRAMUSCULAR; INTRAVENOUS at 13:48

## 2019-08-25 RX ADMIN — GABAPENTIN 600 MG: 300 CAPSULE ORAL at 06:18

## 2019-08-25 RX ADMIN — SODIUM CHLORIDE 125 ML/HR: 9 INJECTION, SOLUTION INTRAVENOUS at 01:23

## 2019-08-25 RX ADMIN — FAMOTIDINE 20 MG: 20 TABLET ORAL at 21:59

## 2019-08-25 RX ADMIN — POLYETHYLENE GLYCOL 3350 17 G: 17 POWDER, FOR SOLUTION ORAL at 09:15

## 2019-08-25 RX ADMIN — MAGNESIUM SULFATE HEPTAHYDRATE 2 G: 40 INJECTION, SOLUTION INTRAVENOUS at 19:39

## 2019-08-25 RX ADMIN — INSULIN LISPRO 8 UNITS: 100 INJECTION, SOLUTION INTRAVENOUS; SUBCUTANEOUS at 12:33

## 2019-08-25 RX ADMIN — INSULIN LISPRO 7 UNITS: 100 INJECTION, SOLUTION INTRAVENOUS; SUBCUTANEOUS at 09:13

## 2019-08-25 RX ADMIN — GABAPENTIN 600 MG: 300 CAPSULE ORAL at 15:43

## 2019-08-25 RX ADMIN — FAMOTIDINE 20 MG: 20 TABLET ORAL at 09:12

## 2019-08-25 RX ADMIN — SODIUM CHLORIDE, PRESERVATIVE FREE 10 ML: 5 INJECTION INTRAVENOUS at 13:47

## 2019-08-26 LAB
ANION GAP SERPL CALCULATED.3IONS-SCNC: 12 MMOL/L (ref 5–15)
BUN BLD-MCNC: 17 MG/DL (ref 6–20)
BUN/CREAT SERPL: 30.9 (ref 7–25)
CALCIUM SPEC-SCNC: 8.8 MG/DL (ref 8.6–10.5)
CHLORIDE SERPL-SCNC: 100 MMOL/L (ref 98–107)
CO2 SERPL-SCNC: 26 MMOL/L (ref 22–29)
CREAT BLD-MCNC: 0.55 MG/DL (ref 0.76–1.27)
DEPRECATED RDW RBC AUTO: 64.2 FL (ref 37–54)
ERYTHROCYTE [DISTWIDTH] IN BLOOD BY AUTOMATED COUNT: 21.5 % (ref 12.3–15.4)
GFR SERPL CREATININE-BSD FRML MDRD: >150 ML/MIN/1.73
GLUCOSE BLD-MCNC: 100 MG/DL (ref 65–99)
GLUCOSE BLDC GLUCOMTR-MCNC: 148 MG/DL (ref 70–130)
GLUCOSE BLDC GLUCOMTR-MCNC: 150 MG/DL (ref 70–130)
GLUCOSE BLDC GLUCOMTR-MCNC: 246 MG/DL (ref 70–130)
GLUCOSE BLDC GLUCOMTR-MCNC: 290 MG/DL (ref 70–130)
HCT VFR BLD AUTO: 43.1 % (ref 37.5–51)
HGB BLD-MCNC: 12.6 G/DL (ref 13–17.7)
MAGNESIUM SERPL-MCNC: 2.9 MG/DL (ref 1.6–2.6)
MCH RBC QN AUTO: 24.1 PG (ref 26.6–33)
MCHC RBC AUTO-ENTMCNC: 29.2 G/DL (ref 31.5–35.7)
MCV RBC AUTO: 82.6 FL (ref 79–97)
PHOSPHATE SERPL-MCNC: 2.4 MG/DL (ref 2.5–4.5)
PLATELET # BLD AUTO: 375 10*3/MM3 (ref 140–450)
PMV BLD AUTO: 10.3 FL (ref 6–12)
POTASSIUM BLD-SCNC: 4.3 MMOL/L (ref 3.5–5.2)
RBC # BLD AUTO: 5.22 10*6/MM3 (ref 4.14–5.8)
SODIUM BLD-SCNC: 138 MMOL/L (ref 136–145)
WBC NRBC COR # BLD: 5.38 10*3/MM3 (ref 3.4–10.8)

## 2019-08-26 PROCEDURE — 84100 ASSAY OF PHOSPHORUS: CPT | Performed by: INTERNAL MEDICINE

## 2019-08-26 PROCEDURE — 97530 THERAPEUTIC ACTIVITIES: CPT

## 2019-08-26 PROCEDURE — 80048 BASIC METABOLIC PNL TOTAL CA: CPT | Performed by: INTERNAL MEDICINE

## 2019-08-26 PROCEDURE — 87070 CULTURE OTHR SPECIMN AEROBIC: CPT | Performed by: INTERNAL MEDICINE

## 2019-08-26 PROCEDURE — 83735 ASSAY OF MAGNESIUM: CPT | Performed by: INTERNAL MEDICINE

## 2019-08-26 PROCEDURE — 85027 COMPLETE CBC AUTOMATED: CPT | Performed by: INTERNAL MEDICINE

## 2019-08-26 PROCEDURE — 87186 SC STD MICRODIL/AGAR DIL: CPT | Performed by: INTERNAL MEDICINE

## 2019-08-26 PROCEDURE — 82962 GLUCOSE BLOOD TEST: CPT

## 2019-08-26 PROCEDURE — 99232 SBSQ HOSP IP/OBS MODERATE 35: CPT | Performed by: INTERNAL MEDICINE

## 2019-08-26 PROCEDURE — 63710000001 INSULIN DETEMIR PER 5 UNITS: Performed by: HOSPITALIST

## 2019-08-26 PROCEDURE — 87205 SMEAR GRAM STAIN: CPT | Performed by: INTERNAL MEDICINE

## 2019-08-26 RX ORDER — GABAPENTIN 300 MG/1
600 CAPSULE ORAL
Status: DISCONTINUED | OUTPATIENT
Start: 2019-08-26 | End: 2019-08-29 | Stop reason: HOSPADM

## 2019-08-26 RX ADMIN — FAMOTIDINE 20 MG: 20 TABLET ORAL at 08:38

## 2019-08-26 RX ADMIN — SODIUM CHLORIDE, PRESERVATIVE FREE 10 ML: 5 INJECTION INTRAVENOUS at 21:08

## 2019-08-26 RX ADMIN — GABAPENTIN 600 MG: 300 CAPSULE ORAL at 06:10

## 2019-08-26 RX ADMIN — DOCUSATE SODIUM 100 MG: 100 CAPSULE, LIQUID FILLED ORAL at 08:38

## 2019-08-26 RX ADMIN — HYDROCODONE BITARTRATE AND ACETAMINOPHEN 2 TABLET: 5; 325 TABLET ORAL at 06:10

## 2019-08-26 RX ADMIN — SODIUM CHLORIDE, PRESERVATIVE FREE 10 ML: 5 INJECTION INTRAVENOUS at 09:32

## 2019-08-26 RX ADMIN — FAMOTIDINE 20 MG: 20 TABLET ORAL at 21:06

## 2019-08-26 RX ADMIN — INSULIN LISPRO 4 UNITS: 100 INJECTION, SOLUTION INTRAVENOUS; SUBCUTANEOUS at 21:07

## 2019-08-26 RX ADMIN — POLYETHYLENE GLYCOL 3350 17 G: 17 POWDER, FOR SOLUTION ORAL at 21:06

## 2019-08-26 RX ADMIN — POLYETHYLENE GLYCOL 3350 17 G: 17 POWDER, FOR SOLUTION ORAL at 08:38

## 2019-08-26 RX ADMIN — DOCUSATE SODIUM 100 MG: 100 CAPSULE, LIQUID FILLED ORAL at 21:06

## 2019-08-26 RX ADMIN — RIVAROXABAN 20 MG: 20 TABLET, FILM COATED ORAL at 17:10

## 2019-08-26 RX ADMIN — LISINOPRIL 10 MG: 10 TABLET ORAL at 08:38

## 2019-08-26 RX ADMIN — INSULIN LISPRO 6 UNITS: 100 INJECTION, SOLUTION INTRAVENOUS; SUBCUTANEOUS at 12:52

## 2019-08-26 RX ADMIN — HYDROCODONE BITARTRATE AND ACETAMINOPHEN 2 TABLET: 5; 325 TABLET ORAL at 16:50

## 2019-08-26 RX ADMIN — SODIUM PHOSPHATE, MONOBASIC, MONOHYDRATE 15 MMOL: 276; 142 INJECTION, SOLUTION INTRAVENOUS at 23:59

## 2019-08-26 RX ADMIN — INSULIN DETEMIR 12 UNITS: 100 INJECTION, SOLUTION SUBCUTANEOUS at 08:40

## 2019-08-27 ENCOUNTER — APPOINTMENT (OUTPATIENT)
Dept: MRI IMAGING | Facility: HOSPITAL | Age: 54
End: 2019-08-27

## 2019-08-27 LAB
ANION GAP SERPL CALCULATED.3IONS-SCNC: 11 MMOL/L (ref 5–15)
BUN BLD-MCNC: 17 MG/DL (ref 6–20)
BUN/CREAT SERPL: 33.3 (ref 7–25)
CALCIUM SPEC-SCNC: 8.3 MG/DL (ref 8.6–10.5)
CHLORIDE SERPL-SCNC: 101 MMOL/L (ref 98–107)
CO2 SERPL-SCNC: 25 MMOL/L (ref 22–29)
CREAT BLD-MCNC: 0.51 MG/DL (ref 0.76–1.27)
GFR SERPL CREATININE-BSD FRML MDRD: >150 ML/MIN/1.73
GLUCOSE BLD-MCNC: 54 MG/DL (ref 65–99)
GLUCOSE BLDC GLUCOMTR-MCNC: 114 MG/DL (ref 70–130)
GLUCOSE BLDC GLUCOMTR-MCNC: 136 MG/DL (ref 70–130)
GLUCOSE BLDC GLUCOMTR-MCNC: 146 MG/DL (ref 70–130)
GLUCOSE BLDC GLUCOMTR-MCNC: 33 MG/DL (ref 70–130)
GLUCOSE BLDC GLUCOMTR-MCNC: 85 MG/DL (ref 70–130)
MAGNESIUM SERPL-MCNC: 2 MG/DL (ref 1.6–2.6)
PHOSPHATE SERPL-MCNC: 4.5 MG/DL (ref 2.5–4.5)
POTASSIUM BLD-SCNC: 4.1 MMOL/L (ref 3.5–5.2)
SODIUM BLD-SCNC: 137 MMOL/L (ref 136–145)

## 2019-08-27 PROCEDURE — 25010000002 MORPHINE PER 10 MG: Performed by: INTERNAL MEDICINE

## 2019-08-27 PROCEDURE — 80048 BASIC METABOLIC PNL TOTAL CA: CPT | Performed by: INTERNAL MEDICINE

## 2019-08-27 PROCEDURE — 73721 MRI JNT OF LWR EXTRE W/O DYE: CPT

## 2019-08-27 PROCEDURE — 97530 THERAPEUTIC ACTIVITIES: CPT

## 2019-08-27 PROCEDURE — 63710000001 INSULIN DETEMIR PER 5 UNITS: Performed by: HOSPITALIST

## 2019-08-27 PROCEDURE — 99232 SBSQ HOSP IP/OBS MODERATE 35: CPT | Performed by: INTERNAL MEDICINE

## 2019-08-27 PROCEDURE — 82962 GLUCOSE BLOOD TEST: CPT

## 2019-08-27 PROCEDURE — 84100 ASSAY OF PHOSPHORUS: CPT | Performed by: INTERNAL MEDICINE

## 2019-08-27 PROCEDURE — 83735 ASSAY OF MAGNESIUM: CPT | Performed by: INTERNAL MEDICINE

## 2019-08-27 PROCEDURE — 25010000002 ONDANSETRON PER 1 MG: Performed by: HOSPITALIST

## 2019-08-27 RX ORDER — NICOTINE 21 MG/24HR
1 PATCH, TRANSDERMAL 24 HOURS TRANSDERMAL
Status: CANCELLED
Start: 2019-08-28

## 2019-08-27 RX ORDER — FAMOTIDINE 20 MG/1
20 TABLET, FILM COATED ORAL 2 TIMES DAILY
Status: CANCELLED
Start: 2019-08-27

## 2019-08-27 RX ORDER — HYDRALAZINE HYDROCHLORIDE 10 MG/1
10 TABLET, FILM COATED ORAL EVERY 8 HOURS PRN
Status: CANCELLED
Start: 2019-08-27

## 2019-08-27 RX ORDER — PSEUDOEPHEDRINE HCL 30 MG
100 TABLET ORAL 2 TIMES DAILY
Status: CANCELLED
Start: 2019-08-27

## 2019-08-27 RX ORDER — LISINOPRIL 10 MG/1
10 TABLET ORAL
Status: CANCELLED
Start: 2019-08-28

## 2019-08-27 RX ORDER — GABAPENTIN 300 MG/1
600 CAPSULE ORAL
Qty: 30 CAPSULE | Refills: 0 | Status: CANCELLED | OUTPATIENT
Start: 2019-08-27

## 2019-08-27 RX ORDER — HYDROCODONE BITARTRATE AND ACETAMINOPHEN 7.5; 325 MG/1; MG/1
1 TABLET ORAL EVERY 6 HOURS PRN
Qty: 12 TABLET | Refills: 0 | Status: CANCELLED | OUTPATIENT
Start: 2019-08-27

## 2019-08-27 RX ADMIN — POLYETHYLENE GLYCOL 3350 17 G: 17 POWDER, FOR SOLUTION ORAL at 08:18

## 2019-08-27 RX ADMIN — RIVAROXABAN 20 MG: 20 TABLET, FILM COATED ORAL at 18:27

## 2019-08-27 RX ADMIN — HYDROCODONE BITARTRATE AND ACETAMINOPHEN 2 TABLET: 5; 325 TABLET ORAL at 00:08

## 2019-08-27 RX ADMIN — INSULIN DETEMIR 12 UNITS: 100 INJECTION, SOLUTION SUBCUTANEOUS at 08:13

## 2019-08-27 RX ADMIN — DOCUSATE SODIUM 100 MG: 100 CAPSULE, LIQUID FILLED ORAL at 21:12

## 2019-08-27 RX ADMIN — LISINOPRIL 10 MG: 10 TABLET ORAL at 08:18

## 2019-08-27 RX ADMIN — HYDROCODONE BITARTRATE AND ACETAMINOPHEN 2 TABLET: 5; 325 TABLET ORAL at 08:18

## 2019-08-27 RX ADMIN — NICOTINE 1 PATCH: 21 PATCH, EXTENDED RELEASE TRANSDERMAL at 08:18

## 2019-08-27 RX ADMIN — FAMOTIDINE 20 MG: 20 TABLET ORAL at 21:12

## 2019-08-27 RX ADMIN — DOCUSATE SODIUM 100 MG: 100 CAPSULE, LIQUID FILLED ORAL at 08:19

## 2019-08-27 RX ADMIN — SODIUM CHLORIDE, PRESERVATIVE FREE 10 ML: 5 INJECTION INTRAVENOUS at 08:18

## 2019-08-27 RX ADMIN — POLYETHYLENE GLYCOL 3350 17 G: 17 POWDER, FOR SOLUTION ORAL at 21:12

## 2019-08-27 RX ADMIN — FAMOTIDINE 20 MG: 20 TABLET ORAL at 08:18

## 2019-08-27 RX ADMIN — ONDANSETRON 4 MG: 2 INJECTION INTRAMUSCULAR; INTRAVENOUS at 18:34

## 2019-08-27 RX ADMIN — GABAPENTIN 600 MG: 300 CAPSULE ORAL at 18:27

## 2019-08-27 RX ADMIN — HYDROCODONE BITARTRATE AND ACETAMINOPHEN 2 TABLET: 5; 325 TABLET ORAL at 18:27

## 2019-08-27 RX ADMIN — GABAPENTIN 600 MG: 300 CAPSULE ORAL at 02:46

## 2019-08-27 RX ADMIN — MORPHINE SULFATE 6 MG: 2 INJECTION, SOLUTION INTRAMUSCULAR; INTRAVENOUS at 05:16

## 2019-08-27 RX ADMIN — SODIUM CHLORIDE, PRESERVATIVE FREE 10 ML: 5 INJECTION INTRAVENOUS at 21:13

## 2019-08-28 VITALS
HEIGHT: 69 IN | WEIGHT: 125.7 LBS | OXYGEN SATURATION: 96 % | RESPIRATION RATE: 18 BRPM | BODY MASS INDEX: 18.62 KG/M2 | HEART RATE: 98 BPM | SYSTOLIC BLOOD PRESSURE: 138 MMHG | TEMPERATURE: 98.2 F | DIASTOLIC BLOOD PRESSURE: 85 MMHG

## 2019-08-28 PROBLEM — E87.20 LACTIC ACIDOSIS: Status: RESOLVED | Noted: 2019-08-19 | Resolved: 2019-08-28

## 2019-08-28 PROBLEM — E11.10 DKA (DIABETIC KETOACIDOSES): Status: RESOLVED | Noted: 2019-08-15 | Resolved: 2019-08-28

## 2019-08-28 PROBLEM — S81.801A OPEN LEG WOUND, RIGHT, INITIAL ENCOUNTER: Status: ACTIVE | Noted: 2019-08-28

## 2019-08-28 PROBLEM — R13.10 DYSPHAGIA: Status: RESOLVED | Noted: 2019-08-21 | Resolved: 2019-08-28

## 2019-08-28 LAB
ANION GAP SERPL CALCULATED.3IONS-SCNC: 14 MMOL/L (ref 5–15)
BUN BLD-MCNC: 16 MG/DL (ref 6–20)
BUN/CREAT SERPL: 28.1 (ref 7–25)
CALCIUM SPEC-SCNC: 8.8 MG/DL (ref 8.6–10.5)
CHLORIDE SERPL-SCNC: 98 MMOL/L (ref 98–107)
CO2 SERPL-SCNC: 25 MMOL/L (ref 22–29)
CREAT BLD-MCNC: 0.57 MG/DL (ref 0.76–1.27)
GFR SERPL CREATININE-BSD FRML MDRD: 148 ML/MIN/1.73
GLUCOSE BLD-MCNC: 148 MG/DL (ref 65–99)
GLUCOSE BLDC GLUCOMTR-MCNC: 205 MG/DL (ref 70–130)
GLUCOSE BLDC GLUCOMTR-MCNC: 344 MG/DL (ref 70–130)
GLUCOSE BLDC GLUCOMTR-MCNC: 381 MG/DL (ref 70–130)
MAGNESIUM SERPL-MCNC: 2.1 MG/DL (ref 1.6–2.6)
PHOSPHATE SERPL-MCNC: 3.5 MG/DL (ref 2.5–4.5)
POTASSIUM BLD-SCNC: 4.7 MMOL/L (ref 3.5–5.2)
SODIUM BLD-SCNC: 137 MMOL/L (ref 136–145)

## 2019-08-28 PROCEDURE — 97110 THERAPEUTIC EXERCISES: CPT

## 2019-08-28 PROCEDURE — 84100 ASSAY OF PHOSPHORUS: CPT | Performed by: INTERNAL MEDICINE

## 2019-08-28 PROCEDURE — 83735 ASSAY OF MAGNESIUM: CPT | Performed by: INTERNAL MEDICINE

## 2019-08-28 PROCEDURE — 63710000001 INSULIN DETEMIR PER 5 UNITS: Performed by: HOSPITALIST

## 2019-08-28 PROCEDURE — 99239 HOSP IP/OBS DSCHRG MGMT >30: CPT | Performed by: PHYSICIAN ASSISTANT

## 2019-08-28 PROCEDURE — 82962 GLUCOSE BLOOD TEST: CPT

## 2019-08-28 PROCEDURE — 97530 THERAPEUTIC ACTIVITIES: CPT

## 2019-08-28 PROCEDURE — 80048 BASIC METABOLIC PNL TOTAL CA: CPT | Performed by: INTERNAL MEDICINE

## 2019-08-28 RX ORDER — INSULIN GLARGINE 100 [IU]/ML
12 INJECTION, SOLUTION SUBCUTANEOUS EVERY MORNING
Qty: 10 PEN | Refills: 3
Start: 2019-08-28 | End: 2019-09-30 | Stop reason: SDUPTHER

## 2019-08-28 RX ORDER — PSEUDOEPHEDRINE HCL 30 MG
100 TABLET ORAL 2 TIMES DAILY
Start: 2019-08-28

## 2019-08-28 RX ORDER — BISACODYL 5 MG/1
10 TABLET, DELAYED RELEASE ORAL DAILY PRN
Start: 2019-08-28

## 2019-08-28 RX ORDER — GABAPENTIN 300 MG/1
600 CAPSULE ORAL
Qty: 30 CAPSULE | Refills: 0 | Status: SHIPPED | OUTPATIENT
Start: 2019-08-28 | End: 2020-02-13

## 2019-08-28 RX ORDER — HYDRALAZINE HYDROCHLORIDE 10 MG/1
10 TABLET, FILM COATED ORAL EVERY 8 HOURS PRN
Start: 2019-08-28

## 2019-08-28 RX ORDER — NICOTINE 21 MG/24HR
1 PATCH, TRANSDERMAL 24 HOURS TRANSDERMAL
Start: 2019-08-29 | End: 2020-02-13

## 2019-08-28 RX ORDER — FAMOTIDINE 20 MG/1
20 TABLET, FILM COATED ORAL 2 TIMES DAILY
Start: 2019-08-28

## 2019-08-28 RX ORDER — LISINOPRIL 10 MG/1
10 TABLET ORAL
Start: 2019-08-29 | End: 2019-09-30 | Stop reason: DRUGHIGH

## 2019-08-28 RX ORDER — HYDROCODONE BITARTRATE AND ACETAMINOPHEN 7.5; 325 MG/1; MG/1
1 TABLET ORAL EVERY 6 HOURS PRN
Qty: 12 TABLET | Refills: 0 | Status: SHIPPED | OUTPATIENT
Start: 2019-08-28 | End: 2020-02-13

## 2019-08-28 RX ADMIN — FAMOTIDINE 20 MG: 20 TABLET ORAL at 08:43

## 2019-08-28 RX ADMIN — INSULIN DETEMIR 12 UNITS: 100 INJECTION, SOLUTION SUBCUTANEOUS at 08:42

## 2019-08-28 RX ADMIN — HYDROCODONE BITARTRATE AND ACETAMINOPHEN 2 TABLET: 5; 325 TABLET ORAL at 12:07

## 2019-08-28 RX ADMIN — HYDROCODONE BITARTRATE AND ACETAMINOPHEN 2 TABLET: 5; 325 TABLET ORAL at 00:10

## 2019-08-28 RX ADMIN — GABAPENTIN 600 MG: 300 CAPSULE ORAL at 00:11

## 2019-08-28 RX ADMIN — HYDROCODONE BITARTRATE AND ACETAMINOPHEN 2 TABLET: 5; 325 TABLET ORAL at 06:03

## 2019-08-28 RX ADMIN — POLYETHYLENE GLYCOL 3350 17 G: 17 POWDER, FOR SOLUTION ORAL at 08:43

## 2019-08-28 RX ADMIN — GABAPENTIN 600 MG: 300 CAPSULE ORAL at 17:35

## 2019-08-28 RX ADMIN — DOCUSATE SODIUM 100 MG: 100 CAPSULE, LIQUID FILLED ORAL at 08:43

## 2019-08-28 RX ADMIN — HYDROCODONE BITARTRATE AND ACETAMINOPHEN 2 TABLET: 5; 325 TABLET ORAL at 17:35

## 2019-08-28 RX ADMIN — RIVAROXABAN 20 MG: 20 TABLET, FILM COATED ORAL at 17:39

## 2019-08-28 RX ADMIN — LISINOPRIL 10 MG: 10 TABLET ORAL at 08:44

## 2019-08-28 RX ADMIN — NICOTINE 1 PATCH: 21 PATCH, EXTENDED RELEASE TRANSDERMAL at 08:44

## 2019-08-28 RX ADMIN — GABAPENTIN 600 MG: 300 CAPSULE ORAL at 12:10

## 2019-08-28 RX ADMIN — GABAPENTIN 600 MG: 300 CAPSULE ORAL at 06:03

## 2019-08-28 RX ADMIN — SODIUM CHLORIDE, PRESERVATIVE FREE 10 ML: 5 INJECTION INTRAVENOUS at 08:45

## 2019-08-29 LAB
BACTERIA SPEC AEROBE CULT: NORMAL
GRAM STN SPEC: NORMAL
GRAM STN SPEC: NORMAL

## 2019-09-17 ENCOUNTER — TELEPHONE (OUTPATIENT)
Dept: INTERNAL MEDICINE | Facility: CLINIC | Age: 54
End: 2019-09-17

## 2019-09-17 NOTE — TELEPHONE ENCOUNTER
SHIVANI WITH [facility] NEEDS TO RESCHEDULE PATIENTS APPOINTMENT WITH [facility]. HE WAS SCHEDULED TO SEE HER ON 9/18/19. YOU CAN REACH SHIVANI BACK [phone] TO RESCHEDULE PATIENTS APPOINTMENT.

## 2019-09-29 ENCOUNTER — LAB REQUISITION (OUTPATIENT)
Dept: LAB | Facility: HOSPITAL | Age: 54
End: 2019-09-29

## 2019-09-29 DIAGNOSIS — Z00.00 ROUTINE GENERAL MEDICAL EXAMINATION AT A HEALTH CARE FACILITY: ICD-10-CM

## 2019-09-29 LAB
ANION GAP SERPL CALCULATED.3IONS-SCNC: 11 MMOL/L (ref 5–15)
BACTERIA UR QL AUTO: NORMAL /HPF
BASOPHILS # BLD AUTO: 0.03 10*3/MM3 (ref 0–0.2)
BASOPHILS NFR BLD AUTO: 0.5 % (ref 0–1.5)
BILIRUB UR QL STRIP: NEGATIVE
BUN BLD-MCNC: 13 MG/DL (ref 6–20)
BUN/CREAT SERPL: 21.3 (ref 7–25)
CALCIUM SPEC-SCNC: 8.9 MG/DL (ref 8.6–10.5)
CHLORIDE SERPL-SCNC: 100 MMOL/L (ref 98–107)
CLARITY UR: CLEAR
CO2 SERPL-SCNC: 27 MMOL/L (ref 22–29)
COLOR UR: YELLOW
CREAT BLD-MCNC: 0.61 MG/DL (ref 0.76–1.27)
DEPRECATED RDW RBC AUTO: 47.8 FL (ref 37–54)
EOSINOPHIL # BLD AUTO: 0.24 10*3/MM3 (ref 0–0.4)
EOSINOPHIL NFR BLD AUTO: 3.8 % (ref 0.3–6.2)
ERYTHROCYTE [DISTWIDTH] IN BLOOD BY AUTOMATED COUNT: 16 % (ref 12.3–15.4)
GFR SERPL CREATININE-BSD FRML MDRD: 137 ML/MIN/1.73
GLUCOSE BLD-MCNC: 286 MG/DL (ref 65–99)
GLUCOSE UR STRIP-MCNC: NEGATIVE MG/DL
HCT VFR BLD AUTO: 33.3 % (ref 37.5–51)
HGB BLD-MCNC: 10.2 G/DL (ref 13–17.7)
HGB UR QL STRIP.AUTO: NEGATIVE
HYALINE CASTS UR QL AUTO: NORMAL /LPF
IMM GRANULOCYTES # BLD AUTO: 0.01 10*3/MM3 (ref 0–0.05)
IMM GRANULOCYTES NFR BLD AUTO: 0.2 % (ref 0–0.5)
KETONES UR QL STRIP: NEGATIVE
LEUKOCYTE ESTERASE UR QL STRIP.AUTO: NEGATIVE
LYMPHOCYTES # BLD AUTO: 1.99 10*3/MM3 (ref 0.7–3.1)
LYMPHOCYTES NFR BLD AUTO: 31.8 % (ref 19.6–45.3)
MCH RBC QN AUTO: 25.3 PG (ref 26.6–33)
MCHC RBC AUTO-ENTMCNC: 30.6 G/DL (ref 31.5–35.7)
MCV RBC AUTO: 82.6 FL (ref 79–97)
MONOCYTES # BLD AUTO: 0.61 10*3/MM3 (ref 0.1–0.9)
MONOCYTES NFR BLD AUTO: 9.8 % (ref 5–12)
NEUTROPHILS # BLD AUTO: 3.37 10*3/MM3 (ref 1.7–7)
NEUTROPHILS NFR BLD AUTO: 53.9 % (ref 42.7–76)
NITRITE UR QL STRIP: NEGATIVE
NRBC BLD AUTO-RTO: 0 /100 WBC (ref 0–0.2)
PH UR STRIP.AUTO: 7 [PH] (ref 5–8)
PLATELET # BLD AUTO: 344 10*3/MM3 (ref 140–450)
PMV BLD AUTO: 10.3 FL (ref 6–12)
POTASSIUM BLD-SCNC: 4.5 MMOL/L (ref 3.5–5.2)
PROT UR QL STRIP: NEGATIVE
RBC # BLD AUTO: 4.03 10*6/MM3 (ref 4.14–5.8)
RBC # UR: NORMAL /HPF
REF LAB TEST METHOD: NORMAL
SODIUM BLD-SCNC: 138 MMOL/L (ref 136–145)
SP GR UR STRIP: 1.01 (ref 1–1.03)
SQUAMOUS #/AREA URNS HPF: NORMAL /HPF
UROBILINOGEN UR QL STRIP: NORMAL
WBC NRBC COR # BLD: 6.25 10*3/MM3 (ref 3.4–10.8)
WBC UR QL AUTO: NORMAL /HPF

## 2019-09-29 PROCEDURE — 80048 BASIC METABOLIC PNL TOTAL CA: CPT

## 2019-09-29 PROCEDURE — 85025 COMPLETE CBC W/AUTO DIFF WBC: CPT

## 2019-09-29 PROCEDURE — 81001 URINALYSIS AUTO W/SCOPE: CPT

## 2019-09-30 ENCOUNTER — OFFICE VISIT (OUTPATIENT)
Dept: ENDOCRINOLOGY | Facility: CLINIC | Age: 54
End: 2019-09-30

## 2019-09-30 VITALS
BODY MASS INDEX: 19.19 KG/M2 | DIASTOLIC BLOOD PRESSURE: 78 MMHG | HEART RATE: 91 BPM | OXYGEN SATURATION: 99 % | WEIGHT: 130 LBS | SYSTOLIC BLOOD PRESSURE: 120 MMHG

## 2019-09-30 DIAGNOSIS — E10.65 UNCONTROLLED TYPE 1 DIABETES MELLITUS WITH HYPERGLYCEMIA (HCC): Primary | ICD-10-CM

## 2019-09-30 LAB
ALBUMIN UR-MCNC: <1.2 MG/DL
CREAT UR-MCNC: 30.8 MG/DL
GLUCOSE BLDC GLUCOMTR-MCNC: 261 MG/DL (ref 70–130)
HBA1C MFR BLD: 8.8 %
MICROALBUMIN/CREAT UR: NORMAL MG/G{CREAT}

## 2019-09-30 PROCEDURE — 82043 UR ALBUMIN QUANTITATIVE: CPT | Performed by: PHYSICIAN ASSISTANT

## 2019-09-30 PROCEDURE — 83036 HEMOGLOBIN GLYCOSYLATED A1C: CPT | Performed by: PHYSICIAN ASSISTANT

## 2019-09-30 PROCEDURE — 82947 ASSAY GLUCOSE BLOOD QUANT: CPT | Performed by: PHYSICIAN ASSISTANT

## 2019-09-30 PROCEDURE — 82570 ASSAY OF URINE CREATININE: CPT | Performed by: PHYSICIAN ASSISTANT

## 2019-09-30 PROCEDURE — 99214 OFFICE O/P EST MOD 30 MIN: CPT | Performed by: PHYSICIAN ASSISTANT

## 2019-09-30 RX ORDER — LISINOPRIL 20 MG/1
TABLET ORAL
COMMUNITY
Start: 2019-09-16

## 2019-09-30 RX ORDER — INSULIN GLARGINE 100 [IU]/ML
18 INJECTION, SOLUTION SUBCUTANEOUS EVERY MORNING
Qty: 8 PEN | Refills: 1
Start: 2019-09-30 | End: 2019-12-09 | Stop reason: SDUPTHER

## 2019-09-30 RX ORDER — ZOLPIDEM TARTRATE 10 MG/1
TABLET ORAL
COMMUNITY
End: 2020-02-13

## 2019-09-30 RX ORDER — GABAPENTIN 800 MG/1
TABLET ORAL
Refills: 0 | COMMUNITY
Start: 2019-09-05

## 2019-09-30 RX ORDER — BLOOD-GLUCOSE METER
1 EACH MISCELLANEOUS DAILY
Qty: 1 KIT | Refills: 0 | Status: SHIPPED | OUTPATIENT
Start: 2019-09-30

## 2019-09-30 NOTE — PROGRESS NOTES
Chief Complaint   Patient presents with   • Follow-up     DM 1     HPI:   Joey Saunders is a 54 y.o.male who [facility] for f/u  of his Type 1 diabetes. Last clinic visit 11/201.  His history is as follows:    Interim events:   - pt has not been seen in our clinic since 11/2016  - was no show for last appt with Dr Monahan 8/26/19   - pt was admitted to [facility] 8/15-8/28/19 with DKA. DC summary reviewed- initially on insulin drip then basal/bolus regimen, treated for CAP, right knee injury from fall but neg for osteomyelitis on xray, UDS positive for TCA but not opiates, although family reported he takes opiates and gabapentin.   - currently at [facility] rehab.   - hx of feeding tube. Now PO feeds    1) Type 1 diabetes mellitus with multiple associated complications:  - Initially diagnosed in 1971 at age 6 year old.   - He has associated peripheral vascular disease, retinopathy, polyneuropathy, and severe gastroparesis  - Due to his severe gastroparesis, he is status post a subtotal gastrectomy by Dr. Escobar [facility]. Has had multiple surgeries for J-tube placement. The most recent in 10/2015. Had infection of the abdominal wall in 2016. Has been without the J-tube since August 2016  - Eats two small meals a day and drinks Boost, Ensure  - h/o sensitivity to Humalog, requires small doses. Have tried Long acting Basal insulin in the past when not on Tube feeds. Has had better control with BID NPH and Humalog    Current DM Medications:  - Lantus 20U qHS  - Humulin R 10U before meals- he often refuses his insulin dose because he knows it will drop his sugar too low    Glucometer/ BG Log Review: did not bring meter    DM Health Maintenance:  Ophtho: Bilateral retinopathy. [facility] [place]. Last exam 2019  Podiatry: no recent visit  Monofilament: bilateral neuropathy and PVD, on gabapentin 800 mg TID  Lipids: (8/2019) total chol 111, TG 89, HDL 32, LDL 61  Urine Microalb/Cr ratio: no  recent test  TSH: (6/2019) TSH 1.16  Aspirin: 81 mg daily    Review of Systems   Constitutional:        Weight stable   HENT: Negative.    Eyes: Negative.    Respiratory: Positive for shortness of breath (with exertion).    Cardiovascular: Negative.  Negative for chest pain.   Gastrointestinal: Negative for diarrhea and vomiting. Abdominal pain: chronic.   Endocrine:        Hyperglycemia after food, shakes   Genitourinary: Negative.    Musculoskeletal: Positive for arthralgias and myalgias.   Skin: Negative.    Neurological: Positive for numbness (lower extremities).   Hematological: Negative.    Psychiatric/Behavioral: Positive for dysphoric mood. Negative for suicidal ideas.   All other systems reviewed and are negative.    The following portions of the patient's history were reviewed and updated as appropriate: allergies, current medications, past family history, past medical history, past social history and past surgical history.    /78   Pulse 91   Wt 59 kg (130 lb) Comment: per pt  SpO2 99%   BMI 19.19 kg/m²   Physical Exam   Constitutional: He is oriented to person, place, and time. He appears well-developed. No distress.   HENT:   Head: Normocephalic.   Mouth/Throat: Oropharynx is clear and moist.   Eyes: Conjunctivae and EOM are normal. Pupils are equal, round, and reactive to light.   Neck: Neck supple. No tracheal deviation present. No thyromegaly present.   No palpable thyroid nodules     Cardiovascular: Normal rate, regular rhythm and normal heart sounds.   No murmur heard.  Pulmonary/Chest: Effort normal and breath sounds normal. No respiratory distress.   Abdominal: Soft. Bowel sounds are normal. He exhibits no mass.   Surgical scar noted   Lymphadenopathy:     He has no cervical adenopathy.   Neurological: He is alert and oriented to person, place, and time. No cranial nerve deficit.   Skin: Skin is warm and dry. He is not diaphoretic. No erythema.   Psychiatric: He has a normal mood and  affect. His behavior is normal.   Vitals reviewed.    LABS/IMAGING:   Results for orders placed or performed in visit on 09/30/19   POC Glucose Fingerstick   Result Value Ref Range    Glucose 261 (A) 70 - 130 mg/dL   POC Glycosylated Hemoglobin (Hb A1C)   Result Value Ref Range    Hemoglobin A1C 8.8 %     ASSESSMENT/PLAN:  1. Type 1 diabetes mellitus with complications of peripheral vascular disease, gastroparesis, polyneuropathy, and retinopathy: uncontrolled  - A1C 8.8 today, . Of note, pt has anemia  - no meter or log for review, pt reports large variability in blood sugar  - he is often refusing to take meal time insulin as his orders at [facility] are written for 10U AC and he feels like he will have hypoglycemia if he takes that much so many times he's not getting prandial insulin at all   - Decrease Lantus to 18U QHS  - Humulin R 5U TID AC plus CF 1:50 >150  - Written instructions on insulin dosing were given to patient  - Needs to check BS AC and HS. Bring meter to f/u.     Check urine alb today.     - He will follow-up in 4 weeks with me and 3-4 months with Dr. Monahan     25 min  of 40 min face-to-face visit time spent for coordination of care and counselling regarding identified problems as outlined in the objective, assessment and discussion portions of the documentation.

## 2019-09-30 NOTE — PATIENT INSTRUCTIONS
Check sugar before meals and at bedtime    Insulin dosing:  Basal insulin - Decrease lantus to 18 units at bedtime      Meal Insulin-  Take humulin R 5 units before meals. Add extra insulin for high pre-meal sugar based on chart below.          Correction insulin (add to meal insulin)   0 unit  if  Blood sugar (BS)  less than 150   1 unit   if BS  150 - 199   2 units if  - 249   3 units if  - 299   4 units if  - 349   5 units if -399   6 units if BS >400

## 2019-10-03 ENCOUNTER — TELEPHONE (OUTPATIENT)
Dept: INTERNAL MEDICINE | Facility: CLINIC | Age: 54
End: 2019-10-03

## 2019-10-03 NOTE — TELEPHONE ENCOUNTER
Patient needs a pack of syringe sent to [facility]) he states that he just got out of the hospital and he needs those right away

## 2019-10-04 NOTE — TELEPHONE ENCOUNTER
Syringes sent to [facility] in [place] per patients request.

## 2019-10-07 NOTE — TELEPHONE ENCOUNTER
Attempted to contact, no answer an no voicemail set up. Rx for syringes was sent to [facility] on 10/4/2019 and they confirmed receipt at 3:45, I did resend them today.

## 2019-10-14 RX ORDER — SYRGE-NDL,INS 0.5 ML HALF MARK 31 GX5/16"
SYRINGE, EMPTY DISPOSABLE MISCELLANEOUS
Refills: 1 | OUTPATIENT
Start: 2019-10-14

## 2019-11-15 RX ORDER — SYRGE-NDL,INS 0.5 ML HALF MARK 31 GX5/16"
SYRINGE, EMPTY DISPOSABLE MISCELLANEOUS
Refills: 0 | OUTPATIENT
Start: 2019-11-15

## 2019-11-15 NOTE — TELEPHONE ENCOUNTER
Insulin Syringes, Disposable, U-100 1 ML misc [[id number]]     Was Sent to pharmacy by Carey on 10/07  For qty 100 with 11 refills.

## 2019-11-19 DIAGNOSIS — IMO0002 UNCONTROLLED TYPE 1 DIABETES MELLITUS WITH COMPLICATION: Primary | ICD-10-CM

## 2019-12-09 DIAGNOSIS — IMO0002 UNCONTROLLED TYPE 1 DIABETES MELLITUS WITH COMPLICATION: Primary | ICD-10-CM

## 2019-12-09 DIAGNOSIS — E10.65 UNCONTROLLED TYPE 1 DIABETES MELLITUS WITH HYPERGLYCEMIA (HCC): ICD-10-CM

## 2019-12-09 DIAGNOSIS — IMO0002 UNCONTROLLED TYPE 1 DIABETES MELLITUS WITH COMPLICATION: ICD-10-CM

## 2019-12-09 RX ORDER — INSULIN GLARGINE 100 [IU]/ML
18 INJECTION, SOLUTION SUBCUTANEOUS EVERY MORNING
Qty: 6 PEN | Refills: 1
Start: 2019-12-09 | End: 2019-12-11 | Stop reason: SDUPTHER

## 2019-12-09 RX ORDER — SYRING-NEEDL,DISP,INSUL,0.3 ML 28GX1/2"
SYRINGE, EMPTY DISPOSABLE MISCELLANEOUS
Refills: 0 | OUTPATIENT
Start: 2019-12-09

## 2019-12-09 RX ORDER — INSULIN ASPART 100 [IU]/ML
INJECTION, SOLUTION INTRAVENOUS; SUBCUTANEOUS
Qty: 10 PEN | Refills: 1 | Status: SHIPPED | OUTPATIENT
Start: 2019-12-09

## 2019-12-09 RX ORDER — PEN NEEDLE, DIABETIC 30 GX3/16"
1 NEEDLE, DISPOSABLE MISCELLANEOUS
Qty: 300 EACH | Refills: 1 | Status: SHIPPED | OUTPATIENT
Start: 2019-12-09 | End: 2019-12-11 | Stop reason: DRUGHIGH

## 2019-12-09 NOTE — TELEPHONE ENCOUNTER
PATIENT WOULD LIKE REFILLS ON LISTED MEDICATION. PATIENTS PHARMACY IS [facility]. PATIENT CAN BE REACHED [phone]. THANKS

## 2019-12-11 DIAGNOSIS — E10.65 UNCONTROLLED TYPE 1 DIABETES MELLITUS WITH HYPERGLYCEMIA (HCC): ICD-10-CM

## 2019-12-11 DIAGNOSIS — IMO0002 UNCONTROLLED TYPE 1 DIABETES MELLITUS WITH COMPLICATION: ICD-10-CM

## 2019-12-11 DIAGNOSIS — IMO0002 UNCONTROLLED TYPE 1 DIABETES MELLITUS WITH COMPLICATION: Primary | ICD-10-CM

## 2019-12-11 RX ORDER — INSULIN GLARGINE 100 [IU]/ML
18 INJECTION, SOLUTION SUBCUTANEOUS EVERY MORNING
Qty: 6 PEN | Refills: 1
Start: 2019-12-11 | End: 2019-12-17 | Stop reason: SDUPTHER

## 2019-12-11 RX ORDER — PEN NEEDLE, DIABETIC 29 G X1/2"
1 NEEDLE, DISPOSABLE MISCELLANEOUS
Qty: 450 EACH | Refills: 3 | Status: SHIPPED | OUTPATIENT
Start: 2019-12-11

## 2019-12-11 RX ORDER — PEN NEEDLE, DIABETIC 30 GX3/16"
1 NEEDLE, DISPOSABLE MISCELLANEOUS
Qty: 300 EACH | Refills: 1 | OUTPATIENT
Start: 2019-12-11

## 2019-12-11 NOTE — TELEPHONE ENCOUNTER
PATIENT STATES PHARMACY HAS NOT RECEIVED A PRESCRIPTION FOR LANTUS. HE IS ABOUT OUT OF THIS MEDICATION AND NEEDS REFILLS. HE ALSO STATES HE NEEDS REFILLS  SYRINGES 28 INCH. HE ONLY HAS 2 PACKS LEFT AND NEEDS THIS REFILLED AS WELL. HE NEEDS US TO SEND THESE PRESCRIPTIONS TO PHARMACY. PATIENTS NUMBER [phone]. HE WOULD LIKE A CALL BACK ABOUT ISSUES WITH THESE PRESCRIPTIONS.

## 2019-12-17 ENCOUNTER — TELEPHONE (OUTPATIENT)
Dept: INTERNAL MEDICINE | Facility: CLINIC | Age: 54
End: 2019-12-17

## 2019-12-17 DIAGNOSIS — IMO0002 UNCONTROLLED TYPE 1 DIABETES MELLITUS WITH COMPLICATION: Primary | ICD-10-CM

## 2019-12-17 DIAGNOSIS — E10.65 UNCONTROLLED TYPE 1 DIABETES MELLITUS WITH HYPERGLYCEMIA (HCC): ICD-10-CM

## 2019-12-17 RX ORDER — INSULIN GLARGINE 100 [IU]/ML
INJECTION, SOLUTION SUBCUTANEOUS
Qty: 6 PEN | Refills: 1 | Status: SHIPPED | OUTPATIENT
Start: 2019-12-17 | End: 2020-05-07 | Stop reason: SDUPTHER

## 2019-12-17 NOTE — TELEPHONE ENCOUNTER
SUGAR LEVELS IN THE 15 RANGE AND PATIENT PASSED OUT THREE TIMES. HE STATED THAT HE FEELS LIKE HES ON HIS DEATH BED. PATIENT WOULD LIKE TO BE SEEN BY YOU AS SOON AS POSSIBLE BUT HE STATES THAT HE ISNT ABLE TO BE SEEN BY YOU UNTIL January 7TH. PLEASE CONTACT PATIENT [phone]

## 2019-12-18 NOTE — TELEPHONE ENCOUNTER
Spoke with patient. Adjusted his insulin to Lantus 8 units qAM and 8 units qHS. He has been vomiting after meals. Instructed pt to take Novolog 3 units after meals plus CF of 1 unit : 50 points over 200. No more that 5 units total per mealtime bolus.   Will call pt for sooner appt if there is a cancellation. Otherwise, he will keep his 01/15/2020 appt. Explained to patient that I would like to order a Dexcom monitor for him but this requires that he be seen at least every 3-4 months per medicare requirements.   Shana Monahan MD

## 2019-12-19 DIAGNOSIS — IMO0002 UNCONTROLLED TYPE 1 DIABETES MELLITUS WITH COMPLICATION: ICD-10-CM

## 2019-12-19 RX ORDER — SYRINGE-NEEDLE,INSULIN,0.5 ML 28GX1/2"
1 SYRINGE, EMPTY DISPOSABLE MISCELLANEOUS DAILY
Qty: 100 EACH | Refills: 0 | Status: SHIPPED | OUTPATIENT
Start: 2019-12-19 | End: 2020-05-13 | Stop reason: SDUPTHER

## 2019-12-19 NOTE — TELEPHONE ENCOUNTER
Ok to refill?  Appears you sent them, it may be a duplicate, but I wanted to double check with you.

## 2020-01-20 ENCOUNTER — TELEPHONE (OUTPATIENT)
Dept: ENDOCRINOLOGY | Facility: CLINIC | Age: 55
End: 2020-01-20

## 2020-01-20 NOTE — TELEPHONE ENCOUNTER
PT UNABLE TO MAKE HIS APPOINTMENT ON 2/3 @ 8:45AM. PT WOULD LIKE AN APPOINTMENT AROUND 10 AM. PT IS CURRENTLY IN THE HOSPITAL AND WONT BE DISCHARGED FOR A COUPLE OF DAYS.

## 2020-02-12 ENCOUNTER — TELEPHONE (OUTPATIENT)
Dept: ENDOCRINOLOGY | Facility: CLINIC | Age: 55
End: 2020-02-12

## 2020-02-13 ENCOUNTER — TELEPHONE (OUTPATIENT)
Dept: ENDOCRINOLOGY | Facility: CLINIC | Age: 55
End: 2020-02-13

## 2020-02-13 ENCOUNTER — OFFICE VISIT (OUTPATIENT)
Dept: ENDOCRINOLOGY | Facility: CLINIC | Age: 55
End: 2020-02-13

## 2020-02-13 VITALS — SYSTOLIC BLOOD PRESSURE: 142 MMHG | HEART RATE: 111 BPM | DIASTOLIC BLOOD PRESSURE: 80 MMHG | OXYGEN SATURATION: 98 %

## 2020-02-13 DIAGNOSIS — IMO0002 UNCONTROLLED TYPE 1 DIABETES MELLITUS WITH COMPLICATION: Primary | ICD-10-CM

## 2020-02-13 DIAGNOSIS — E11.649 DIABETIC HYPOGLYCEMIA (HCC): ICD-10-CM

## 2020-02-13 LAB
EXPIRATION DATE: ABNORMAL
EXPIRATION DATE: NORMAL
GLUCOSE BLDC GLUCOMTR-MCNC: 134 MG/DL (ref 70–130)
HBA1C MFR BLD: 8.4 %
Lab: ABNORMAL
Lab: NORMAL

## 2020-02-13 PROCEDURE — 82947 ASSAY GLUCOSE BLOOD QUANT: CPT | Performed by: INTERNAL MEDICINE

## 2020-02-13 PROCEDURE — 83036 HEMOGLOBIN GLYCOSYLATED A1C: CPT | Performed by: INTERNAL MEDICINE

## 2020-02-13 PROCEDURE — 99214 OFFICE O/P EST MOD 30 MIN: CPT | Performed by: INTERNAL MEDICINE

## 2020-02-13 RX ORDER — HYDROCODONE BITARTRATE AND ACETAMINOPHEN 10; 325 MG/1; MG/1
TABLET ORAL
COMMUNITY
Start: 2020-02-10

## 2020-02-13 RX ORDER — DULOXETIN HYDROCHLORIDE 60 MG/1
CAPSULE, DELAYED RELEASE ORAL
COMMUNITY
Start: 2020-02-11

## 2020-02-13 RX ORDER — CYPROHEPTADINE HYDROCHLORIDE 4 MG/1
TABLET ORAL
COMMUNITY
Start: 2020-01-21

## 2020-02-13 RX ORDER — TAMSULOSIN HYDROCHLORIDE 0.4 MG/1
CAPSULE ORAL
Refills: 3 | COMMUNITY
Start: 2019-11-19

## 2020-02-13 NOTE — TELEPHONE ENCOUNTER
Patient had to cancel his appointment with Dr. Monahan today. Dr. Monahan okayed to schedule him on 03/03, the same day that he has his orthopedic appointment.     Called patient to see what time his ortho appt is so we can try to schedule around it.  No answer, mailbox full so unable to lvm.

## 2020-02-13 NOTE — PROGRESS NOTES
Chief Complaint   Patient presents with   • Diabetes     DM1 f/u      HPI:   Joey Saunders is a 54 y.o.male who [facility] for f/u  of his Type 1 diabetes. Last clinic visit 2019 with kerry ratliff Ms. Carey Sanches PA-C.  His history is as follows:    Interim Events:   - has been hospitalized multiple times since last visit in 2019. DKA in 2019, S/P Right AKA in 2019 after developing infection in the BKA. Hospitalized in 2019 for hypoglycemia  - has been hospitalized for hypoglycemia since his last visit  - living in [place] by himself. Transportation to appointments has been difficult for him  - spoke with pt in 2019 and adjusted his Lantus due to frequent hypoglycemia    1) Type 1 diabetes mellitus with multiple associated complications:  - Initially diagnosed in  at age 6 year old.   - He has associated peripheral vascular disease s/p Right AKA, retinopathy, polyneuropathy, and severe gastroparesis  - Due to his severe gastroparesis, he is status post a subtotal gastrectomy by Dr. Escobar [facility]. Has had multiple surgeries for J-tube placement. The most recent in 10/2015. Had infection of the abdominal wall in 2016. Has been without the J-tube since 2016  - Eats two small meals a day and drinks Boost, Ensure  - h/o sensitivity to Humalog, requires small doses. H/O brittle diabetes    Current DM Medications:  - Lantus: 8 units qAM, 8 units qPM   - Humalo-10 units after meals-  He is using his post meal BG to calculate his correction dose of 1:50 > 150 leading to hypoglycemia. Not following directions reviewed with him in 2019.      Glucometer/ BG Log Review:   - checks BG 4 - 5 times a day  - post-prandial hyperglycemia due to delayed insulin and frequent hypoglycemia after correction  - does not have symptoms until BG < 60    DM Health Maintenance:  Ophtho: Bilateral retinopathy. followed by the [facility] in [place]. Last exam   Podiatry: no recent  visit  Monofilament: bilateral neuropathy and PVD, on gabapentin 800 mg TID  Lipids: (8/2019) total chol 111, TG 89, HDL 32, LDL 61 - not on a statin  Urine Microalb/Cr ratio: (09/2019) normal - on lisinopril 20 mg daily  TSH: (6/2019) TSH 1.16  BMP: Cr 0.61,   CBC: (09/2019) Hgb 10.2  Aspirin: 81 mg daily    Review of Systems   Constitutional:        Weight stable   HENT: Negative.    Eyes: Negative.    Respiratory: Positive for shortness of breath (with exertion).    Cardiovascular: Negative.  Negative for chest pain.   Gastrointestinal: Negative for diarrhea and vomiting. Abdominal pain: chronic.   Endocrine:        Hyperglycemia after food, shakes  Hypoglycemia after too much correction insulin   Genitourinary: Negative.    Musculoskeletal: Positive for arthralgias and myalgias.   Skin: Negative.    Neurological: Positive for numbness (lower extremities).   Hematological: Negative.    Psychiatric/Behavioral: Positive for dysphoric mood. Negative for suicidal ideas.   All other systems reviewed and are negative.    The following portions of the patient's history were reviewed and updated as appropriate: allergies, current medications, past family history, past medical history, past social history, past surgical history and problem list.    /80   Pulse 111   SpO2 98%   Physical Exam   Constitutional: He is oriented to person, place, and time. He appears well-developed. No distress.   HENT:   Head: Normocephalic.   Mouth/Throat: Oropharynx is clear and moist.   Eyes: Pupils are equal, round, and reactive to light. Conjunctivae and EOM are normal.   Neck: Neck supple. No tracheal deviation present. No thyromegaly present.   No palpable thyroid nodules     Cardiovascular: Regular rhythm and normal heart sounds. Tachycardia present.   No murmur heard.  Pulmonary/Chest: Effort normal and breath sounds normal. No respiratory distress.   Abdominal: Soft. Bowel sounds are normal. He exhibits no mass.    Surgical scar noted   Musculoskeletal:   S/p right AKA   Lymphadenopathy:     He has no cervical adenopathy.   Neurological: He is alert and oriented to person, place, and time. No cranial nerve deficit.   Skin: Skin is warm and dry. He is not diaphoretic. No erythema.   Psychiatric: He has a normal mood and affect. His behavior is normal.   Vitals reviewed.    LABS/IMAGING: prior labs reviewed and summarized in HPI  Results for orders placed or performed in visit on 02/13/20   POC Glucose Fingerstick   Result Value Ref Range    Glucose 134 (A) 70 - 130 mg/dL    Lot Number [id number]     Expiration Date 70,220    POC Glycosylated Hemoglobin (Hb A1C)   Result Value Ref Range    Hemoglobin A1C 8.4 %    Lot Number [id number]     Expiration Date 11/10/21      ASSESSMENT/PLAN:  1. Type 1 diabetes mellitus with complications of peripheral vascular disease s/p right AKA, gastroparesis, polyneuropathy, and retinopathy: uncontrolled  - A1C 8.4 today.. Of note, pt has anemia.     - Reviewed with patient to check BG before eating and base correction dose on the pre-meal glucose. Reviewed that he can take correction scale humalog insulin when not eating but injections of Humalog need to be kept 3-4 hours apart.     Pt would benefit from Dexcom CGM. He meets medicare criteria:  - check FSBG 4 times a day or more  - takes 5 injections of insulin per day and requires frequent adjusting of his insulin doses  - Pt with brittle diabetes, frequent hypoglycemia, hypoglycemic unawareness , hospitalizations for DKA and hypoglycemia    Will change insulin regimen to:  - Lantus: 8 units qAM, 8 units qPM   - Humalog 4 units TID with meals plus correction factor: 1 unit for every 50 > 200  - Written instructions on insulin dosing were reviewed with patient    - He will follow-up in 3 weeks with me on 03/03/2020    Counseling was given to patient for the following topics:  instructions for management and importance of treatment compliance,  see details in assessment/plan. Total face to face time of the encounter was 30 minutes and 25 minutes was spent counseling.

## 2020-03-16 RX ORDER — SYRGE-NDL,INS 0.5 ML HALF MARK 31 GX5/16"
SYRINGE, EMPTY DISPOSABLE MISCELLANEOUS
Qty: 200 EACH | Refills: 3 | Status: SHIPPED | OUTPATIENT
Start: 2020-03-16

## 2020-03-18 ENCOUNTER — TELEPHONE (OUTPATIENT)
Dept: ENDOCRINOLOGY | Facility: CLINIC | Age: 55
End: 2020-03-18

## 2020-03-18 DIAGNOSIS — IMO0002 UNCONTROLLED TYPE 1 DIABETES MELLITUS WITH COMPLICATION: Primary | ICD-10-CM

## 2020-03-18 NOTE — TELEPHONE ENCOUNTER
Received a denial for patients Glucagon 1 mg emergency kit . Per Insurance Alternative drug is   Glucagen Hypokit Injection. Please advise ?

## 2020-03-31 ENCOUNTER — TELEPHONE (OUTPATIENT)
Dept: ENDOCRINOLOGY | Facility: CLINIC | Age: 55
End: 2020-03-31

## 2020-03-31 NOTE — TELEPHONE ENCOUNTER
Please call pt I was calling to reschedule his appt with Dr Monahan and he said he needs to come in for an appt.  She ordered him a new machine that checks his BS without sticking him-she told him to come in and we would show him how to do it.    pts number is [phone]

## 2020-03-31 NOTE — TELEPHONE ENCOUNTER
Spoke with pt and rescheduled him to 5/27, pt received a Dexcom so I gave him the number to contact for that training. Told pt to call with any additional questions or concerns.

## 2020-05-07 DIAGNOSIS — E10.65 UNCONTROLLED TYPE 1 DIABETES MELLITUS WITH HYPERGLYCEMIA (HCC): ICD-10-CM

## 2020-05-07 NOTE — TELEPHONE ENCOUNTER
PATIENT IS CALLING STATING HE ACCIDENTALLY THREW IS TRANSMITTER AWAY AFTER A SPELL HE HAD AND YANKED IT OFF. HE NEEDS A NEW ORDER FOR DEXCOM G6 TRANSMITTER. HE ALSO NEEDS A NEW PRESCRIPTION FOR LANTUS SOLO STAR.

## 2020-05-08 RX ORDER — INSULIN GLARGINE 100 [IU]/ML
INJECTION, SOLUTION SUBCUTANEOUS
Qty: 6 PEN | Refills: 1 | Status: SHIPPED | OUTPATIENT
Start: 2020-05-08

## 2020-05-08 RX ORDER — PROCHLORPERAZINE 25 MG/1
1 SUPPOSITORY RECTAL
Qty: 1 EACH | Refills: 3 | Status: SHIPPED | OUTPATIENT
Start: 2020-05-08 | End: 2020-05-12 | Stop reason: SDUPTHER

## 2020-05-12 ENCOUNTER — TELEPHONE (OUTPATIENT)
Dept: ENDOCRINOLOGY | Facility: CLINIC | Age: 55
End: 2020-05-12

## 2020-05-12 DIAGNOSIS — IMO0002 UNCONTROLLED TYPE 1 DIABETES MELLITUS WITH COMPLICATION: Primary | ICD-10-CM

## 2020-05-12 RX ORDER — PROCHLORPERAZINE 25 MG/1
1 SUPPOSITORY RECTAL
Qty: 1 EACH | Refills: 3 | Status: SHIPPED | OUTPATIENT
Start: 2020-05-12 | End: 2020-06-16 | Stop reason: SDUPTHER

## 2020-05-12 NOTE — TELEPHONE ENCOUNTER
PLEASE CALL IN FOR PT ASAP HE THREW HIS TRANSMITTER AWAY  HE NEEDS A G6 CALLED TO  [facility] IN [place]      FYI    HE ALSO FELL THE OTHER DAY AND CUT HIS EYE WIDE OPENED HIS BS WAS 12 AT THE [place] [facility] THEY PUT BUTTERFLIES ON IT AND SENT HIM HOME    PTS NUMBER IF YOU HAVE QUESTIONS [phone]

## 2020-05-13 DIAGNOSIS — IMO0002 UNCONTROLLED TYPE 1 DIABETES MELLITUS WITH COMPLICATION: ICD-10-CM

## 2020-05-13 RX ORDER — SYRINGE-NEEDLE,INSULIN,0.5 ML 28GX1/2"
1 SYRINGE, EMPTY DISPOSABLE MISCELLANEOUS DAILY
Qty: 100 EACH | Refills: 0 | Status: SHIPPED | OUTPATIENT
Start: 2020-05-13 | End: 2020-07-06

## 2020-05-29 ENCOUNTER — TELEPHONE (OUTPATIENT)
Dept: ENDOCRINOLOGY | Facility: CLINIC | Age: 55
End: 2020-05-29

## 2020-06-01 NOTE — TELEPHONE ENCOUNTER
William,     See message below.  Please call the [employer] company that is supplying his Dexcom transmitter and see what we need to do for him to get a new transmitter since he accidentally threw his away. Pt also missed his recent appointment. Please have pt call to reschedule appointment.     Thanks  MD
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
## 2020-06-02 NOTE — TELEPHONE ENCOUNTER
Tried calling patient to see about getting him scheduled for an f/u appt with Dr. Mnoahan , no answer and vm has not been set up. I also emailed Ronny with [employer] to see if we can possibly see about getting patient a new transmitter. Awaiting response. I will also try to contact patient again later today with this information.

## 2020-06-03 NOTE — TELEPHONE ENCOUNTER
William,     Please keep trying to get in touch with him. I don't know if he knows how to get the new transmitter. There was also a form for a PA that I filled out and I you faxed. I think this was to [employer], Maybe?    Thanks  MD

## 2020-06-03 NOTE — TELEPHONE ENCOUNTER
Received message from Ronny with [employer] concerning patients transmitter. Per Ronny patient was given the information to contact [facility] for replacement. Per Ronny patient should be eligible to reorder a new one. Tried calling patient this morning to confirm to see if he was able to speak with anyone with this information. No answer no vm.

## 2020-06-04 NOTE — TELEPHONE ENCOUNTER
I spoke with Joey this morning concerning his dexcom. Pt states that he is not able to receive a replacement transmitter at this time according to [facility]. I asked if they gave him a reason as to why they are not going to replace it at this time, pt stated no reason was given. I am currently waiting on a reply back from Ronny with [employer] to see if we could get to the bottom of what's going on and what we could do on our end. I also spoke to the pharmacy on Tuesday, the form that was signed  was PA.  Pharmacy stated that patient has never picked up a transmitter from them ([facility])  that it comes from [facility], so the form was not needed for the pharmacy. I am working with [employer] to see if we could get transmitter replaced . Please advise ?

## 2020-06-05 NOTE — TELEPHONE ENCOUNTER
Thank you for looking into this. He should be able to get a new transmitter. People lose them all the time. Yes, see if the [employer] rep can help us on this.  MD Rosario

## 2020-06-08 NOTE — TELEPHONE ENCOUNTER
Ronny with [employer] has taken care of patients transmitter. Per Ronny he has spoken with [employer] and a placed monthly supply order for a transmitter and sensors. Pt is also aware .

## 2020-06-15 NOTE — TELEPHONE ENCOUNTER
Pt still has not received his transmitter and sensors. Pt states he had to go to the hospital last night because his sugar was over 800.

## 2020-06-15 NOTE — TELEPHONE ENCOUNTER
Sent e-mail to Ronny to f/u on patients transmitter. Pending reply. I will call patient to keep him informed.

## 2020-06-15 NOTE — TELEPHONE ENCOUNTER
Tried calling patient back with no success. Spoke with Ronny with [employer] ,informed that they have been trying to get a hold of patient since last week as well. Pt now has [employer] medicare advantage and [employer] is out of network , they are trying to get his member ID in order to get transmitter. Patient will also need to call [facility] with information as we need up date as well. I also tried calling secondary number that was given by patients brother- POC no answer and no voicemail. Will attempt to call again tomorrow.

## 2020-06-16 ENCOUNTER — TELEPHONE (OUTPATIENT)
Dept: ENDOCRINOLOGY | Facility: CLINIC | Age: 55
End: 2020-06-16

## 2020-06-16 DIAGNOSIS — IMO0002 UNCONTROLLED TYPE 1 DIABETES MELLITUS WITH COMPLICATION: ICD-10-CM

## 2020-06-16 RX ORDER — PROCHLORPERAZINE 25 MG/1
1 SUPPOSITORY RECTAL
Qty: 1 EACH | Refills: 3 | Status: SHIPPED | OUTPATIENT
Start: 2020-06-16 | End: 2020-06-16 | Stop reason: SDUPTHER

## 2020-06-16 RX ORDER — PROCHLORPERAZINE 25 MG/1
SUPPOSITORY RECTAL
Qty: 3 EACH | Refills: 5 | Status: SHIPPED | OUTPATIENT
Start: 2020-06-16

## 2020-06-16 RX ORDER — PROCHLORPERAZINE 25 MG/1
1 SUPPOSITORY RECTAL
Qty: 1 EACH | Refills: 3 | Status: SHIPPED | OUTPATIENT
Start: 2020-06-16

## 2020-06-16 NOTE — TELEPHONE ENCOUNTER
TONY:    Spoke to patient about his ER visit concerning his blood sugar elevation. Offered patient to come in sooner if needed due to high levels. Pt stated that he wanted to wait until he can get his transmitter which may help with his numbers. I also informed patient that Ronny with [employer] need his new insurance card number and for patient to give him a call to get his transmitter through another company. Patient new number is [id number]. Pt stated that he changed insurance hoping they would cover more. Pt was given Ronny number and I emailed Ronny concerning this information as well. Informed patient to give us a call if he need assistance with his transmitter or a early appt. Pt verbalized understanding.

## 2020-06-16 NOTE — TELEPHONE ENCOUNTER
Returned patient billy to Alexandra about the 800 Blood Sugars.  Patient stated that his meter or Dexcom was not working properly.  Became sick called 911 was his Blood Sugar was 856. They took him to the hospital and put him on an insulin drip, released him once was down to 80.  I have sent RX to [facility] (patient preference)  Just wanted to advise you about blood sugar issues

## 2020-06-16 NOTE — TELEPHONE ENCOUNTER
PATIENT STATES THAT HE ACCIDENTALLY THREW AWAY HIS DEXCOM TRANSMITTER AND NEEDS A NEW ONE. HE STATES THAT HIS GLUCOSE READING WAS OVER 800 THE OTHER NIGHT. [facility] IN [place].    CALL BACK [phone]

## 2020-06-16 NOTE — TELEPHONE ENCOUNTER
Tried calling patient this morning concerning patients transmitter. Pt phone number is not working/ out of service. I also called the number that was given by patients brother for significant other there was no answer. I will attempt to contact this number again later today.

## 2020-07-03 DIAGNOSIS — IMO0002 UNCONTROLLED TYPE 1 DIABETES MELLITUS WITH COMPLICATION: ICD-10-CM

## 2020-07-06 RX ORDER — SYRING-NEEDL,DISP,INSUL,0.3 ML 28GX1/2"
SYRINGE, EMPTY DISPOSABLE MISCELLANEOUS
Qty: 100 EACH | Refills: 0 | Status: SHIPPED | OUTPATIENT
Start: 2020-07-06

## 2021-05-19 NOTE — ED NOTES
Patient discharged at this time. Patient instructed to monitor glucose at home and instructed on new medications and follow up. Patient left ER in wheelchair, no distress note.       Luigi Huggins RN  06/15/19 6925 Retinal tear and detachment warning symptoms reviewed and patient instructed to call immediately if increasing floaters, flashes, or decreasing peripheral vision.

## 2021-11-02 NOTE — ED NOTES
BG upon arrival to ED 68, patient remains unresponsive. No reaction when MD attempting IV start. Pupils pinpoint. Narcan given and within a couple minutes the patient starts opening his eyes and pulling at the sheets. Dr Tay Chao at bedside. Patient is unable to answer questions at this time.      Nilson Gruber RN  07/15/19 1284
Called Sesar Cali numerous times at various phone numbers with no answer. Also tried number for [employer]. Number listed for [employer] is not a working number. Advised PT that it was unsuccessful contacting Sesar Cali or [employer]. He then advised that Sesar Cali was in [place] today at the doctor and he did not have a working number for [employer]. I asked if there was anyone else we could contact to pick him up and he said \"No\". PT refused to give me contact for anyone else.        Sriram Candelario  07/15/19 4506
Multiple attempts to restart piv per nurse and MD with no success.      Yfn Ron, RN  07/15/19 0883
Patient opens eyes briefly, moans and shuts his eyes and rolls over on stretcher.      Srinivasa Alvarez RN  07/15/19 5336
Tried number for [employer] again. No answer.       Oliverio Santos  07/15/19 3260
23

## 2023-02-25 NOTE — TELEPHONE ENCOUNTER
Resent to local pharmacy per pt's request.    Tetracycline Counseling: Patient counseled regarding possible photosensitivity and increased risk for sunburn.  Patient instructed to avoid sunlight, if possible.  When exposed to sunlight, patients should wear protective clothing, sunglasses, and sunscreen.  The patient was instructed to call the office immediately if the following severe adverse effects occur:  hearing changes, easy bruising/bleeding, severe headache, or vision changes.  The patient verbalized understanding of the proper use and possible adverse effects of tetracycline.  All of the patient's questions and concerns were addressed. Patient understands to avoid pregnancy while on therapy due to potential birth defects.